# Patient Record
Sex: FEMALE | Race: WHITE | NOT HISPANIC OR LATINO | Employment: UNEMPLOYED
[De-identification: names, ages, dates, MRNs, and addresses within clinical notes are randomized per-mention and may not be internally consistent; named-entity substitution may affect disease eponyms.]

---

## 2017-02-16 ENCOUNTER — HOSPITAL (OUTPATIENT)
Dept: OTHER | Age: 32
End: 2017-02-16
Attending: INTERNAL MEDICINE

## 2017-03-28 ENCOUNTER — HOSPITAL (OUTPATIENT)
Dept: OTHER | Age: 32
End: 2017-03-28
Attending: EMERGENCY MEDICINE

## 2017-03-28 LAB
ALBUMIN SERPL-MCNC: 4.3 GM/DL (ref 3.6–5.1)
ALP SERPL-CCNC: 47 UNIT/L (ref 45–117)
ALT SERPL-CCNC: 28 UNIT/L
AMORPH SED URNS QL MICRO: ABNORMAL
AMYLASE SERPL-CCNC: 86 UNIT/L (ref 25–115)
ANALYZER ANC (IANC): ABNORMAL
ANION GAP SERPL CALC-SCNC: 13 MMOL/L (ref 10–20)
APPEARANCE UR: CLEAR
AST SERPL-CCNC: 16 UNIT/L
BACTERIA #/AREA URNS HPF: ABNORMAL /HPF
BASOPHILS # BLD: 0 THOUSAND/MCL (ref 0–0.3)
BASOPHILS NFR BLD: 0 %
BILIRUB CONJ SERPL-MCNC: 0.1 MG/DL (ref 0–0.2)
BILIRUB SERPL-MCNC: 0.5 MG/DL (ref 0.2–1)
BILIRUB UR QL: NEGATIVE
BUN SERPL-MCNC: 9 MG/DL (ref 6–20)
BUN/CREAT SERPL: 13 (ref 7–25)
CALCIUM SERPL-MCNC: 9.5 MG/DL (ref 8.4–10.2)
CAOX CRY URNS QL MICRO: ABNORMAL
CHLORIDE: 97 MMOL/L (ref 98–107)
CO2 SERPL-SCNC: 28 MMOL/L (ref 21–32)
COLOR UR: COLORLESS
CREAT SERPL-MCNC: 0.68 MG/DL (ref 0.51–0.95)
DIFFERENTIAL METHOD BLD: ABNORMAL
EOSINOPHIL # BLD: 0.2 THOUSAND/MCL (ref 0.1–0.5)
EOSINOPHIL NFR BLD: 2 %
EPITH CASTS #/AREA URNS LPF: ABNORMAL /[LPF]
ERYTHROCYTE [DISTWIDTH] IN BLOOD: 15.2 % (ref 11–15)
FATTY CASTS #/AREA URNS LPF: ABNORMAL /[LPF]
GLUCOSE SERPL-MCNC: 88 MG/DL (ref 65–99)
GLUCOSE UR-MCNC: NEGATIVE MG/DL
GRAN CASTS #/AREA URNS LPF: ABNORMAL /[LPF]
HEMATOCRIT: 34.9 % (ref 36–46.5)
HGB BLD-MCNC: 11.9 GM/DL (ref 12–15.5)
HGB UR QL: NEGATIVE
HYALINE CASTS #/AREA URNS LPF: ABNORMAL /LPF (ref 0–5)
KETONES UR-MCNC: NEGATIVE MG/DL
LEUKOCYTE ESTERASE UR QL STRIP: NEGATIVE
LIPASE SERPL-CCNC: 170 UNIT/L (ref 73–393)
LYMPHOCYTES # BLD: 2.3 THOUSAND/MCL (ref 1–4.8)
LYMPHOCYTES NFR BLD: 31 %
MAGNESIUM SERPL-MCNC: 2 MG/DL (ref 1.7–2.4)
MCH RBC QN AUTO: 20.2 PG (ref 26–34)
MCHC RBC AUTO-ENTMCNC: 34.1 GM/DL (ref 32–36.5)
MCV RBC AUTO: 59.3 FL (ref 78–100)
MIXED CELL CASTS #/AREA URNS LPF: ABNORMAL /[LPF]
MONOCYTES # BLD: 0.3 THOUSAND/MCL (ref 0.3–0.9)
MONOCYTES NFR BLD: 5 %
MUCOUS THREADS URNS QL MICRO: ABNORMAL
NEUTROPHILS # BLD: 4.6 THOUSAND/MCL (ref 1.8–7.7)
NEUTROPHILS NFR BLD: 62 %
NEUTS SEG NFR BLD: ABNORMAL %
NITRITE UR QL: NEGATIVE
PERCENT NRBC: ABNORMAL
PH UR: 7 UNIT (ref 5–7)
PLATELET # BLD: 359 THOUSAND/MCL (ref 140–450)
POTASSIUM SERPL-SCNC: 4 MMOL/L (ref 3.4–5.1)
PROCALCITONIN SERPL IA-MCNC: <0.05 NG/ML
PROT SERPL-MCNC: 7.6 GM/DL (ref 6.4–8.2)
PROT UR QL: NEGATIVE MG/DL
RBC # BLD: 5.89 MILLION/MCL (ref 4–5.2)
RBC #/AREA URNS HPF: ABNORMAL /HPF (ref 0–3)
RBC CASTS #/AREA URNS LPF: ABNORMAL /[LPF]
RENAL EPI CELLS #/AREA URNS HPF: ABNORMAL /[HPF]
SODIUM SERPL-SCNC: 134 MMOL/L (ref 135–145)
SP GR UR: <1.005 (ref 1–1.03)
SPECIMEN SOURCE: ABNORMAL
SPERM URNS QL MICRO: ABNORMAL
SQUAMOUS #/AREA URNS HPF: ABNORMAL /HPF (ref 0–5)
T VAGINALIS URNS QL MICRO: ABNORMAL
TRI-PHOS CRY URNS QL MICRO: ABNORMAL
URATE CRY URNS QL MICRO: ABNORMAL
URINE REFLEX: ABNORMAL
URNS CMNT MICRO: ABNORMAL
UROBILINOGEN UR QL: 0.2 MG/DL (ref 0–1)
WAXY CASTS #/AREA URNS LPF: ABNORMAL /[LPF]
WBC # BLD: 7.4 THOUSAND/MCL (ref 4.2–11)
WBC #/AREA URNS HPF: ABNORMAL /HPF (ref 0–5)
WBC CASTS #/AREA URNS LPF: ABNORMAL /[LPF]
YEAST HYPHAE URNS QL MICRO: ABNORMAL
YEAST URNS QL MICRO: ABNORMAL

## 2017-08-04 ENCOUNTER — HOSPITAL ENCOUNTER (EMERGENCY)
Facility: HOSPITAL | Age: 32
Discharge: HOME | End: 2017-08-04
Attending: PHYSICIAN ASSISTANT
Payer: COMMERCIAL

## 2017-08-04 ENCOUNTER — APPOINTMENT (OUTPATIENT)
Dept: GENERAL RADIOLOGY | Facility: HOSPITAL | Age: 32
End: 2017-08-04
Attending: PHYSICIAN ASSISTANT
Payer: COMMERCIAL

## 2017-08-04 VITALS
BODY MASS INDEX: 28.32 KG/M2 | RESPIRATION RATE: 16 BRPM | OXYGEN SATURATION: 97 % | WEIGHT: 170 LBS | TEMPERATURE: 98 F | HEIGHT: 65 IN | DIASTOLIC BLOOD PRESSURE: 66 MMHG | HEART RATE: 66 BPM | SYSTOLIC BLOOD PRESSURE: 106 MMHG
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
## 2017-08-04 DIAGNOSIS — S20.219A BILATERAL CONTUSION OF RIBS: ICD-10-CM

## 2017-08-04 DIAGNOSIS — S16.1XXA CERVICAL STRAIN, INITIAL ENCOUNTER: Primary | ICD-10-CM

## 2017-08-04 LAB — B-HCG UR QL: NEGATIVE

## 2017-08-04 PROCEDURE — 81025 URINE PREGNANCY TEST: CPT

## 2017-08-04 PROCEDURE — 99284 EMERGENCY DEPT VISIT MOD MDM: CPT

## 2017-08-04 PROCEDURE — 71111 X-RAY EXAM RIBS/CHEST4/> VWS: CPT | Performed by: PHYSICIAN ASSISTANT

## 2017-08-04 PROCEDURE — 72040 X-RAY EXAM NECK SPINE 2-3 VW: CPT | Performed by: PHYSICIAN ASSISTANT

## 2017-08-04 PROCEDURE — 96372 THER/PROPH/DIAG INJ SC/IM: CPT

## 2017-08-04 RX ORDER — VENLAFAXINE 75 MG/1
225 TABLET ORAL NIGHTLY
COMMUNITY

## 2017-08-04 RX ORDER — DIAZEPAM 5 MG/1
5 TABLET ORAL ONCE
Status: COMPLETED | OUTPATIENT
Start: 2017-08-04 | End: 2017-08-04

## 2017-08-04 RX ORDER — RISPERIDONE 1 MG/1
1 TABLET, FILM COATED ORAL NIGHTLY
COMMUNITY

## 2017-08-04 RX ORDER — HYDROCODONE BITARTRATE AND ACETAMINOPHEN 5; 325 MG/1; MG/1
1 TABLET ORAL ONCE
Status: COMPLETED | OUTPATIENT
Start: 2017-08-04 | End: 2017-08-04

## 2017-08-04 RX ORDER — KETOROLAC TROMETHAMINE 30 MG/ML
60 INJECTION, SOLUTION INTRAMUSCULAR; INTRAVENOUS ONCE
Status: COMPLETED | OUTPATIENT
Start: 2017-08-04 | End: 2017-08-04

## 2017-08-04 RX ORDER — TRAZODONE HYDROCHLORIDE 100 MG/1
100 TABLET ORAL NIGHTLY
COMMUNITY

## 2017-08-04 RX ORDER — CYCLOBENZAPRINE HCL 10 MG
10 TABLET ORAL 3 TIMES DAILY PRN
Qty: 14 TABLET | Refills: 0 | Status: SHIPPED | OUTPATIENT
Start: 2017-08-04 | End: 2017-08-11

## 2017-08-04 RX ORDER — HYDROCODONE BITARTRATE AND ACETAMINOPHEN 5; 325 MG/1; MG/1
1 TABLET ORAL EVERY 6 HOURS PRN
Qty: 8 TABLET | Refills: 0 | Status: SHIPPED | OUTPATIENT
Start: 2017-08-04 | End: 2017-08-11

## 2017-08-04 RX ORDER — IBUPROFEN 600 MG/1
TABLET ORAL
Qty: 20 TABLET | Refills: 0 | Status: SHIPPED | OUTPATIENT
Start: 2017-08-04

## 2017-08-04 RX ORDER — HYDROXYZINE HYDROCHLORIDE 25 MG/1
25 TABLET, FILM COATED ORAL 3 TIMES DAILY
COMMUNITY
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
## 2017-08-04 NOTE — ED NOTES
Pain reassessed and pt denies any relief from pain mediation.  Nola Garsia NP, notified with new orders to administer Norco.
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
## 2017-08-16 ENCOUNTER — HOSPITAL ENCOUNTER (EMERGENCY)
Facility: HOSPITAL | Age: 32
Discharge: HOME | End: 2017-08-16
Payer: COMMERCIAL

## 2017-08-16 VITALS
OXYGEN SATURATION: 98 % | HEART RATE: 80 BPM | BODY MASS INDEX: 29.02 KG/M2 | DIASTOLIC BLOOD PRESSURE: 74 MMHG | RESPIRATION RATE: 20 BRPM | WEIGHT: 170 LBS | HEIGHT: 64 IN | TEMPERATURE: 98 F | SYSTOLIC BLOOD PRESSURE: 112 MMHG

## 2017-08-16 DIAGNOSIS — M54.12 CERVICAL RADICULOPATHY: Primary | ICD-10-CM

## 2017-08-16 LAB — B-HCG UR QL: NEGATIVE

## 2017-08-16 PROCEDURE — 81025 URINE PREGNANCY TEST: CPT

## 2017-08-16 PROCEDURE — 99283 EMERGENCY DEPT VISIT LOW MDM: CPT

## 2017-08-16 RX ORDER — HYDROCODONE BITARTRATE AND ACETAMINOPHEN 5; 325 MG/1; MG/1
1-2 TABLET ORAL EVERY 4 HOURS PRN
Qty: 14 TABLET | Refills: 0 | Status: SHIPPED | OUTPATIENT
Start: 2017-08-16 | End: 2017-08-23

## 2017-08-16 RX ORDER — METHYLPREDNISOLONE 4 MG/1
TABLET ORAL
Qty: 1 PACKAGE | Refills: 0 | Status: SHIPPED | OUTPATIENT
Start: 2017-08-16 | End: 2017-08-21
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
## 2018-02-09 ENCOUNTER — HOSPITAL (OUTPATIENT)
Dept: OTHER | Age: 33
End: 2018-02-09
Attending: EMERGENCY MEDICINE

## 2018-02-11 ENCOUNTER — HOSPITAL (OUTPATIENT)
Dept: OTHER | Age: 33
End: 2018-02-11
Attending: EMERGENCY MEDICINE

## 2018-02-11 LAB
ANALYZER ANC (IANC): ABNORMAL
ANION GAP SERPL CALC-SCNC: 11 MMOL/L (ref 10–20)
BASOPHILS # BLD: 0 THOUSAND/MCL (ref 0–0.3)
BASOPHILS NFR BLD: 0 %
BUN SERPL-MCNC: 3 MG/DL (ref 6–20)
BUN/CREAT SERPL: 4 (ref 7–25)
CALCIUM SERPL-MCNC: 8.6 MG/DL (ref 8.4–10.2)
CHLORIDE: 101 MMOL/L (ref 98–107)
CO2 SERPL-SCNC: 27 MMOL/L (ref 21–32)
CREAT SERPL-MCNC: 0.67 MG/DL (ref 0.51–0.95)
DIFFERENTIAL METHOD BLD: ABNORMAL
EOSINOPHIL # BLD: 0.4 THOUSAND/MCL (ref 0.1–0.5)
EOSINOPHIL NFR BLD: 5 %
ERYTHROCYTE [DISTWIDTH] IN BLOOD: 16 % (ref 11–15)
GLUCOSE SERPL-MCNC: 107 MG/DL (ref 65–99)
HEMATOCRIT: 34.2 % (ref 36–46.5)
HGB BLD-MCNC: 11.2 GM/DL (ref 12–15.5)
LYMPHOCYTES # BLD: 3.5 THOUSAND/MCL (ref 1–4.8)
LYMPHOCYTES NFR BLD: 48 %
MCH RBC QN AUTO: 19.9 PG (ref 26–34)
MCHC RBC AUTO-ENTMCNC: 32.7 GM/DL (ref 32–36.5)
MCV RBC AUTO: 60.6 FL (ref 78–100)
MONOCYTES # BLD: 0.4 THOUSAND/MCL (ref 0.3–0.9)
MONOCYTES NFR BLD: 6 %
NEUTROPHILS # BLD: 3 THOUSAND/MCL (ref 1.8–7.7)
NEUTROPHILS NFR BLD: 41 %
NEUTS SEG NFR BLD: ABNORMAL %
PERCENT NRBC: ABNORMAL
PLATELET # BLD: 298 THOUSAND/MCL (ref 140–450)
POTASSIUM SERPL-SCNC: 3.9 MMOL/L (ref 3.4–5.1)
RBC # BLD: 5.64 MILLION/MCL (ref 4–5.2)
SODIUM SERPL-SCNC: 135 MMOL/L (ref 135–145)
WBC # BLD: 7.4 THOUSAND/MCL (ref 4.2–11)

## 2018-05-17 ENCOUNTER — HOSPITAL (OUTPATIENT)
Dept: OTHER | Age: 33
End: 2018-05-17
Attending: INTERNAL MEDICINE

## 2018-05-17 ENCOUNTER — CHARTING TRANS (OUTPATIENT)
Dept: OTHER | Age: 33
End: 2018-05-17

## 2018-05-17 LAB
ALBUMIN SERPL-MCNC: 3.6 GM/DL (ref 3.6–5.1)
ALP SERPL-CCNC: 45 UNIT/L (ref 45–117)
ALT SERPL-CCNC: 19 UNIT/L
ANALYZER ANC (IANC): ABNORMAL
AST SERPL-CCNC: 10 UNIT/L
BASOPHILS # BLD: 0 THOUSAND/MCL (ref 0–0.3)
BASOPHILS NFR BLD: 1 %
BILIRUB CONJ SERPL-MCNC: 0.1 MG/DL (ref 0–0.2)
BILIRUB SERPL-MCNC: 0.6 MG/DL (ref 0.2–1)
DIFFERENTIAL METHOD BLD: ABNORMAL
EOSINOPHIL # BLD: 0.2 THOUSAND/MCL (ref 0.1–0.5)
EOSINOPHIL NFR BLD: 3 %
ERYTHROCYTE [DISTWIDTH] IN BLOOD: 16 % (ref 11–15)
HEMATOCRIT: 33.5 % (ref 36–46.5)
HGB BLD-MCNC: 11.4 GM/DL (ref 12–15.5)
LIPASE SERPL-CCNC: 104 UNIT/L (ref 73–393)
LYMPHOCYTES # BLD: 1.5 THOUSAND/MCL (ref 1–4.8)
LYMPHOCYTES NFR BLD: 25 %
MCH RBC QN AUTO: 20.6 PG (ref 26–34)
MCHC RBC AUTO-ENTMCNC: 34 GM/DL (ref 32–36.5)
MCV RBC AUTO: 60.5 FL (ref 78–100)
MONOCYTES # BLD: 0.3 THOUSAND/MCL (ref 0.3–0.9)
MONOCYTES NFR BLD: 5 %
NEUTROPHILS # BLD: 4.1 THOUSAND/MCL (ref 1.8–7.7)
NEUTROPHILS NFR BLD: 66 %
NEUTS SEG NFR BLD: ABNORMAL %
NRBC (NRBCRE): ABNORMAL
PLATELET # BLD: 299 THOUSAND/MCL (ref 140–450)
PROT SERPL-MCNC: 6.4 GM/DL (ref 6.4–8.2)
RBC # BLD: 5.54 MILLION/MCL (ref 4–5.2)
WBC # BLD: 6.2 THOUSAND/MCL (ref 4.2–11)

## 2019-06-07 ENCOUNTER — TELEPHONE (OUTPATIENT)
Dept: SCHEDULING | Age: 34
End: 2019-06-07

## 2020-02-22 ENCOUNTER — APPOINTMENT (OUTPATIENT)
Dept: CT IMAGING | Age: 35
End: 2020-02-22
Attending: EMERGENCY MEDICINE

## 2020-02-22 ENCOUNTER — HOSPITAL ENCOUNTER (OUTPATIENT)
Age: 35
Setting detail: OBSERVATION
Discharge: LEFT BEFORE DISCHARGE | End: 2020-02-23
Attending: EMERGENCY MEDICINE | Admitting: INTERNAL MEDICINE

## 2020-02-22 ENCOUNTER — APPOINTMENT (OUTPATIENT)
Dept: MRI IMAGING | Age: 35
End: 2020-02-22
Attending: PSYCHIATRY & NEUROLOGY

## 2020-02-22 ENCOUNTER — APPOINTMENT (OUTPATIENT)
Dept: GENERAL RADIOLOGY | Age: 35
End: 2020-02-22
Attending: EMERGENCY MEDICINE

## 2020-02-22 ENCOUNTER — TELEPHONE (OUTPATIENT)
Dept: BEHAVIORAL HEALTH | Age: 35
End: 2020-02-22

## 2020-02-22 DIAGNOSIS — F28 OTHER PSYCHOTIC DISORDER NOT DUE TO SUBSTANCE OR KNOWN PHYSIOLOGICAL CONDITION (CMD): ICD-10-CM

## 2020-02-22 DIAGNOSIS — G93.40 ENCEPHALOPATHY: ICD-10-CM

## 2020-02-22 DIAGNOSIS — R25.1 SHAKING: ICD-10-CM

## 2020-02-22 DIAGNOSIS — F44.5 PSEUDOSEIZURE: ICD-10-CM

## 2020-02-22 DIAGNOSIS — R50.9 ACUTE FEBRILE ILLNESS: Primary | ICD-10-CM

## 2020-02-22 DIAGNOSIS — F43.10 PTSD (POST-TRAUMATIC STRESS DISORDER): ICD-10-CM

## 2020-02-22 DIAGNOSIS — R50.9 FEVER, UNSPECIFIED FEVER CAUSE: ICD-10-CM

## 2020-02-22 LAB
# SPEC OBTAINED: 4
ALBUMIN SERPL-MCNC: 3.9 G/DL (ref 3.6–5.1)
ALBUMIN/GLOB SERPL: 1.2 {RATIO} (ref 1–2.4)
ALP SERPL-CCNC: 44 UNITS/L (ref 45–117)
ALT SERPL-CCNC: 23 UNITS/L
AMPHETAMINES UR QL: NEGATIVE
ANION GAP SERPL CALC-SCNC: 10 MMOL/L (ref 10–20)
APAP SERPL-MCNC: <2 MCG/ML (ref 10–30)
APPEARANCE CSF: NORMAL
APPEARANCE UR: CLEAR
AST SERPL-CCNC: 25 UNITS/L
BACTERIA #/AREA URNS HPF: ABNORMAL /HPF
BARBITURATES UR QL: NEGATIVE
BASOPHILS # BLD AUTO: 0.1 K/MCL (ref 0–0.3)
BASOPHILS NFR BLD AUTO: 1 %
BENZODIAZ UR QL: POSITIVE
BILIRUB SERPL-MCNC: 0.3 MG/DL (ref 0.2–1)
BILIRUB UR QL STRIP: NEGATIVE
BUN SERPL-MCNC: 10 MG/DL (ref 6–20)
BUN/CREAT SERPL: 12 (ref 7–25)
BZE UR QL: NEGATIVE
CALCIUM SERPL-MCNC: 8.8 MG/DL (ref 8.4–10.2)
CANNABINOIDS UR QL SCN: POSITIVE
CHLORIDE SERPL-SCNC: 112 MMOL/L (ref 98–107)
CO2 SERPL-SCNC: 21 MMOL/L (ref 21–32)
COLOR UR: ABNORMAL
CREAT SERPL-MCNC: 0.82 MG/DL (ref 0.51–0.95)
CSF DIFFERENTIAL REMARKS(CREM): NORMAL
DIFFERENTIAL METHOD BLD: ABNORMAL
EOSINOPHIL # BLD AUTO: 0.3 K/MCL (ref 0.1–0.5)
EOSINOPHIL NFR SPEC: 4 %
ERYTHROCYTE [DISTWIDTH] IN BLOOD: 16.8 % (ref 11–15)
ETHANOL SERPL-MCNC: NORMAL MG/DL
GLOBULIN SER-MCNC: 3.3 G/DL (ref 2–4)
GLUCOSE BLDC GLUCOMTR-MCNC: 85 MG/DL (ref 70–99)
GLUCOSE CSF-MCNC: 55 MG/DL (ref 40–70)
GLUCOSE SERPL-MCNC: 84 MG/DL (ref 65–99)
GLUCOSE UR STRIP-MCNC: NEGATIVE MG/DL
HCG UR QL: NEGATIVE
HCT VFR BLD CALC: 36.6 % (ref 36–46.5)
HGB BLD-MCNC: 11.1 G/DL (ref 12–15.5)
HGB UR QL STRIP: NEGATIVE
HYALINE CASTS #/AREA URNS LPF: ABNORMAL /LPF (ref 0–5)
KETONES UR STRIP-MCNC: NEGATIVE MG/DL
LEUKOCYTE ESTERASE UR QL STRIP: NEGATIVE
LYMPHOCYTES # BLD MANUAL: 1.9 K/MCL (ref 1–4.8)
LYMPHOCYTES NFR BLD MANUAL: 24 %
MCH RBC QN AUTO: 19.7 PG (ref 26–34)
MCHC RBC AUTO-ENTMCNC: 30.3 G/DL (ref 32–36.5)
MCV RBC AUTO: 65 FL (ref 78–100)
MONOCYTES # BLD MANUAL: 0.3 K/MCL (ref 0.3–0.9)
MONOCYTES NFR BLD MANUAL: 4 %
MUCOUS THREADS URNS QL MICRO: PRESENT
NEUTROPHILS # BLD: 5.3 K/MCL (ref 1.8–7.7)
NEUTROPHILS NFR BLD AUTO: 67 %
NITRITE UR QL STRIP: NEGATIVE
NRBC BLD MANUAL-RTO: 0 /100 WBC
NUC CELL # CSF: 1 /MCL (ref 0–10)
OPIATES UR QL: NEGATIVE
PCP UR QL: NEGATIVE
PH UR STRIP: 6 UNITS (ref 5–7)
PLATELET # BLD: 335 K/MCL (ref 140–450)
POTASSIUM SERPL-SCNC: 4.1 MMOL/L (ref 3.4–5.1)
PROT CSF-MCNC: 28 MG/DL (ref 15–45)
PROT SERPL-MCNC: 7.2 G/DL (ref 6.4–8.2)
PROT UR STRIP-MCNC: NEGATIVE MG/DL
RBC # BLD: 5.63 MIL/MCL (ref 4–5.2)
RBC # CSF MANUAL: NORMAL /MCL
RBC #/AREA URNS HPF: ABNORMAL /HPF (ref 0–2)
SALICYLATES SERPL-MCNC: 3.6 MG/DL
SODIUM SERPL-SCNC: 139 MMOL/L (ref 135–145)
SP GR UR STRIP: 1.01 (ref 1–1.03)
SPECIMEN SOURCE: ABNORMAL
SPECIMEN VOL CSF: 11.5 ML
SQUAMOUS #/AREA URNS HPF: ABNORMAL /HPF (ref 0–5)
TUBE # CSF: 4
UROBILINOGEN UR STRIP-MCNC: 0.2 MG/DL (ref 0–1)
VALPROATE SERPL-MCNC: <3 MCG/ML (ref 50–125)
WBC # BLD: 7.9 K/MCL (ref 4.2–11)
WBC #/AREA URNS HPF: ABNORMAL /HPF (ref 0–5)
XANTHOCHROMIA CSF QL: NORMAL

## 2020-02-22 PROCEDURE — 80307 DRUG TEST PRSMV CHEM ANLYZR: CPT

## 2020-02-22 PROCEDURE — 10002807 HB RX 258: Performed by: INTERNAL MEDICINE

## 2020-02-22 PROCEDURE — 96374 THER/PROPH/DIAG INJ IV PUSH: CPT

## 2020-02-22 PROCEDURE — 82945 GLUCOSE OTHER FLUID: CPT

## 2020-02-22 PROCEDURE — 10002807 HB RX 258: Performed by: PSYCHIATRY & NEUROLOGY

## 2020-02-22 PROCEDURE — 96367 TX/PROPH/DG ADDL SEQ IV INF: CPT

## 2020-02-22 PROCEDURE — 80053 COMPREHEN METABOLIC PANEL: CPT

## 2020-02-22 PROCEDURE — 84157 ASSAY OF PROTEIN OTHER: CPT

## 2020-02-22 PROCEDURE — G0378 HOSPITAL OBSERVATION PER HR: HCPCS

## 2020-02-22 PROCEDURE — 10002800 HB RX 250 W HCPCS: Performed by: EMERGENCY MEDICINE

## 2020-02-22 PROCEDURE — 96365 THER/PROPH/DIAG IV INF INIT: CPT

## 2020-02-22 PROCEDURE — 99284 EMERGENCY DEPT VISIT MOD MDM: CPT

## 2020-02-22 PROCEDURE — 96376 TX/PRO/DX INJ SAME DRUG ADON: CPT

## 2020-02-22 PROCEDURE — 87483 CNS DNA AMP PROBE TYPE 12-25: CPT

## 2020-02-22 PROCEDURE — 10002801 HB RX 250 W/O HCPCS: Performed by: PSYCHIATRY & NEUROLOGY

## 2020-02-22 PROCEDURE — 87070 CULTURE OTHR SPECIMN AEROBIC: CPT

## 2020-02-22 PROCEDURE — 80329 ANALGESICS NON-OPIOID 1 OR 2: CPT

## 2020-02-22 PROCEDURE — 10002800 HB RX 250 W HCPCS: Performed by: PSYCHIATRY & NEUROLOGY

## 2020-02-22 PROCEDURE — 82962 GLUCOSE BLOOD TEST: CPT

## 2020-02-22 PROCEDURE — 84703 CHORIONIC GONADOTROPIN ASSAY: CPT

## 2020-02-22 PROCEDURE — 10004651 HB RX, NO CHARGE ITEM: Performed by: EMERGENCY MEDICINE

## 2020-02-22 PROCEDURE — 85025 COMPLETE CBC W/AUTO DIFF WBC: CPT

## 2020-02-22 PROCEDURE — 62328 DX LMBR SPI PNXR W/FLUOR/CT: CPT

## 2020-02-22 PROCEDURE — 89051 BODY FLUID CELL COUNT: CPT

## 2020-02-22 PROCEDURE — 96366 THER/PROPH/DIAG IV INF ADDON: CPT

## 2020-02-22 PROCEDURE — 10004651 HB RX, NO CHARGE ITEM: Performed by: INTERNAL MEDICINE

## 2020-02-22 PROCEDURE — 87040 BLOOD CULTURE FOR BACTERIA: CPT

## 2020-02-22 PROCEDURE — 10002803 HB RX 637: Performed by: INTERNAL MEDICINE

## 2020-02-22 PROCEDURE — 80164 ASSAY DIPROPYLACETIC ACD TOT: CPT

## 2020-02-22 PROCEDURE — 96375 TX/PRO/DX INJ NEW DRUG ADDON: CPT

## 2020-02-22 PROCEDURE — 80320 DRUG SCREEN QUANTALCOHOLS: CPT

## 2020-02-22 PROCEDURE — 96361 HYDRATE IV INFUSION ADD-ON: CPT

## 2020-02-22 PROCEDURE — 70450 CT HEAD/BRAIN W/O DYE: CPT

## 2020-02-22 PROCEDURE — 10002807 HB RX 258: Performed by: EMERGENCY MEDICINE

## 2020-02-22 PROCEDURE — 81001 URINALYSIS AUTO W/SCOPE: CPT

## 2020-02-22 PROCEDURE — 99220 INITIAL OBSERVATION CARE,LEVL III: CPT | Performed by: INTERNAL MEDICINE

## 2020-02-22 RX ORDER — MIDAZOLAM HYDROCHLORIDE 5 MG/ML
INJECTION INTRAMUSCULAR; INTRAVENOUS
Status: DISCONTINUED
Start: 2020-02-22 | End: 2020-02-22 | Stop reason: WASHOUT

## 2020-02-22 RX ORDER — CLONAZEPAM 0.5 MG/1
0.5 TABLET ORAL DAILY PRN
Status: DISCONTINUED | OUTPATIENT
Start: 2020-02-22 | End: 2020-02-23 | Stop reason: HOSPADM

## 2020-02-22 RX ORDER — ALBUTEROL SULFATE 90 UG/1
2 AEROSOL, METERED RESPIRATORY (INHALATION) EVERY 4 HOURS PRN
Status: ON HOLD | COMMUNITY
End: 2020-02-22

## 2020-02-22 RX ORDER — 0.9 % SODIUM CHLORIDE 0.9 %
2 VIAL (ML) INJECTION EVERY 12 HOURS SCHEDULED
Status: DISCONTINUED | OUTPATIENT
Start: 2020-02-22 | End: 2020-02-23 | Stop reason: HOSPADM

## 2020-02-22 RX ORDER — HYDROCODONE BITARTRATE AND ACETAMINOPHEN 5; 325 MG/1; MG/1
1 TABLET ORAL EVERY 4 HOURS PRN
Status: DISCONTINUED | OUTPATIENT
Start: 2020-02-22 | End: 2020-02-23 | Stop reason: HOSPADM

## 2020-02-22 RX ORDER — VENLAFAXINE HYDROCHLORIDE 75 MG/1
225 CAPSULE, EXTENDED RELEASE ORAL DAILY
Refills: 1 | COMMUNITY
Start: 2020-02-19 | End: 2022-08-03 | Stop reason: ALTCHOICE

## 2020-02-22 RX ORDER — CELECOXIB 100 MG/1
100 CAPSULE ORAL DAILY PRN
Refills: 1 | COMMUNITY
Start: 2019-12-18 | End: 2022-01-18

## 2020-02-22 RX ORDER — DIPHENHYDRAMINE HCL 25 MG
50 CAPSULE ORAL DAILY PRN
Status: DISCONTINUED | OUTPATIENT
Start: 2020-02-22 | End: 2020-02-23 | Stop reason: HOSPADM

## 2020-02-22 RX ORDER — LEVETIRACETAM 500 MG/1
500 TABLET ORAL 2 TIMES DAILY
Status: DISCONTINUED | OUTPATIENT
Start: 2020-02-23 | End: 2020-02-22

## 2020-02-22 RX ORDER — GABAPENTIN 400 MG/1
600 CAPSULE ORAL 3 TIMES DAILY
Refills: 1 | COMMUNITY
Start: 2020-02-18 | End: 2022-10-06 | Stop reason: ALTCHOICE

## 2020-02-22 RX ORDER — HEPARIN SODIUM 10000 [USP'U]/100ML
30 INJECTION, SOLUTION INTRAVENOUS CONTINUOUS
Status: DISCONTINUED | OUTPATIENT
Start: 2020-02-22 | End: 2020-02-22 | Stop reason: CLARIF

## 2020-02-22 RX ORDER — BUPROPION HYDROCHLORIDE 150 MG/1
150 TABLET, EXTENDED RELEASE ORAL EVERY MORNING
Refills: 1 | COMMUNITY
Start: 2020-02-19 | End: 2022-01-18

## 2020-02-22 RX ORDER — CEFAZOLIN SODIUM/WATER 1 G/10 ML
1000 SYRINGE (ML) INTRAVENOUS ONCE
Status: DISCONTINUED | OUTPATIENT
Start: 2020-02-22 | End: 2020-02-22 | Stop reason: DRUGHIGH

## 2020-02-22 RX ORDER — LORAZEPAM 2 MG/ML
1 INJECTION INTRAMUSCULAR ONCE
Status: COMPLETED | OUTPATIENT
Start: 2020-02-22 | End: 2020-02-22

## 2020-02-22 RX ORDER — DIPHENHYDRAMINE HCL 25 MG
50 CAPSULE ORAL NIGHTLY PRN
Status: ON HOLD | COMMUNITY
End: 2023-01-10 | Stop reason: HOSPADM

## 2020-02-22 RX ORDER — PRAZOSIN HYDROCHLORIDE 2 MG/1
4 CAPSULE ORAL NIGHTLY
COMMUNITY
End: 2022-08-03 | Stop reason: SDUPTHER

## 2020-02-22 RX ORDER — DIVALPROEX SODIUM 500 MG/1
500 TABLET, DELAYED RELEASE ORAL EVERY 12 HOURS SCHEDULED
Status: DISCONTINUED | OUTPATIENT
Start: 2020-02-23 | End: 2020-02-23 | Stop reason: HOSPADM

## 2020-02-22 RX ORDER — PRAZOSIN HYDROCHLORIDE 1 MG/1
1 CAPSULE ORAL NIGHTLY
Status: DISCONTINUED | OUTPATIENT
Start: 2020-02-22 | End: 2020-02-23 | Stop reason: HOSPADM

## 2020-02-22 RX ORDER — LORAZEPAM 2 MG/ML
INJECTION INTRAMUSCULAR
Status: DISPENSED
Start: 2020-02-22 | End: 2020-02-22

## 2020-02-22 RX ORDER — CLONAZEPAM 0.5 MG/1
0.5 TABLET ORAL DAILY PRN
Refills: 1 | COMMUNITY
Start: 2020-02-20 | End: 2022-08-03 | Stop reason: SDUPTHER

## 2020-02-22 RX ORDER — LORAZEPAM 2 MG/ML
2 INJECTION INTRAMUSCULAR ONCE
Status: COMPLETED | OUTPATIENT
Start: 2020-02-22 | End: 2020-02-22

## 2020-02-22 RX ORDER — CELECOXIB 100 MG/1
100 CAPSULE ORAL DAILY PRN
Status: DISCONTINUED | OUTPATIENT
Start: 2020-02-22 | End: 2020-02-23 | Stop reason: HOSPADM

## 2020-02-22 RX ORDER — BUPROPION HYDROCHLORIDE 150 MG/1
150 TABLET, EXTENDED RELEASE ORAL EVERY MORNING
Status: DISCONTINUED | OUTPATIENT
Start: 2020-02-23 | End: 2020-02-22

## 2020-02-22 RX ORDER — ACETAMINOPHEN 500 MG
1000 TABLET ORAL ONCE
Status: COMPLETED | OUTPATIENT
Start: 2020-02-22 | End: 2020-02-22

## 2020-02-22 RX ORDER — LEVETIRACETAM 500 MG/1
500 TABLET ORAL 2 TIMES DAILY
COMMUNITY
End: 2022-08-03 | Stop reason: ALTCHOICE

## 2020-02-22 RX ORDER — HALOPERIDOL 5 MG/ML
INJECTION INTRAMUSCULAR
Status: DISCONTINUED
Start: 2020-02-22 | End: 2020-02-22 | Stop reason: WASHOUT

## 2020-02-22 RX ORDER — CEFAZOLIN SODIUM/WATER 2 G/20 ML
2000 SYRINGE (ML) INTRAVENOUS ONCE
Status: COMPLETED | OUTPATIENT
Start: 2020-02-22 | End: 2020-02-22

## 2020-02-22 RX ORDER — GABAPENTIN 400 MG/1
400 CAPSULE ORAL 3 TIMES DAILY
Status: DISCONTINUED | OUTPATIENT
Start: 2020-02-22 | End: 2020-02-23 | Stop reason: HOSPADM

## 2020-02-22 RX ADMIN — SODIUM CHLORIDE, SODIUM LACTATE, POTASSIUM CHLORIDE, AND CALCIUM CHLORIDE 500 ML: .6; .31; .03; .02 INJECTION, SOLUTION INTRAVENOUS at 21:48

## 2020-02-22 RX ADMIN — DEXTROSE 1000 MG: 50 INJECTION, SOLUTION INTRAVENOUS at 12:31

## 2020-02-22 RX ADMIN — ACETAMINOPHEN 1000 MG: 500 TABLET, FILM COATED ORAL at 13:36

## 2020-02-22 RX ADMIN — SODIUM CHLORIDE, PRESERVATIVE FREE 2 ML: 5 INJECTION INTRAVENOUS at 21:49

## 2020-02-22 RX ADMIN — HYDROCODONE BITARTRATE AND ACETAMINOPHEN 1 TABLET: 5; 325 TABLET ORAL at 23:13

## 2020-02-22 RX ADMIN — CEFTRIAXONE SODIUM 2000 MG: 100 INJECTION, POWDER, FOR SOLUTION INTRAVENOUS at 13:38

## 2020-02-22 RX ADMIN — LORAZEPAM 1 MG: 2 INJECTION INTRAMUSCULAR; INTRAVENOUS at 08:30

## 2020-02-22 RX ADMIN — HEPARIN SODIUM 30 UNITS/KG/HR: 10000 INJECTION, SOLUTION INTRAVENOUS at 17:31

## 2020-02-22 RX ADMIN — ACYCLOVIR SODIUM 770 MG: 50 INJECTION, SOLUTION INTRAVENOUS at 15:14

## 2020-02-22 RX ADMIN — GABAPENTIN 400 MG: 400 CAPSULE ORAL at 23:14

## 2020-02-22 RX ADMIN — LORAZEPAM 2 MG: 2 INJECTION INTRAMUSCULAR; INTRAVENOUS at 13:13

## 2020-02-22 RX ADMIN — PRAZOSIN HYDROCHLORIDE 1 MG: 1 CAPSULE ORAL at 23:14

## 2020-02-22 RX ADMIN — SODIUM CHLORIDE, PRESERVATIVE FREE 2 ML: 5 INJECTION INTRAVENOUS at 08:30

## 2020-02-22 ASSESSMENT — PATIENT HEALTH QUESTIONNAIRE - PHQ9
SUM OF ALL RESPONSES TO PHQ9 QUESTIONS 1 TO 9: 12
7. TROUBLE CONCENTRATING ON THINGS, SUCH AS READING THE NEWSPAPER OR WATCHING TELEVISION: SEVERAL DAYS
2. FEELING DOWN, DEPRESSED OR HOPELESS: NEARLY EVERY DAY
9. THOUGHTS THAT YOU WOULD BE BETTER OFF DEAD, OR OF HURTING YOURSELF: NOT AT ALL
SUM OF ALL RESPONSES TO PHQ9 QUESTIONS 1 AND 2: 6
1. LITTLE INTEREST OR PLEASURE IN DOING THINGS: NEARLY EVERY DAY
5. POOR APPETITE OR OVEREATING: SEVERAL DAYS
SUM OF ALL RESPONSES TO PHQ9 QUESTIONS 1 AND 2: 6
4. FEELING TIRED OR HAVING LITTLE ENERGY: SEVERAL DAYS
SUM OF ALL RESPONSES TO PHQ QUESTIONS 1-9: 12
CLINICAL INTERPRETATION OF PHQ9 SCORE: MODERATE DEPRESSION
3. TROUBLE FALLING OR STAYING ASLEEP OR SLEEPING TOO MUCH: SEVERAL DAYS
IS PATIENT ABLE TO COMPLETE PHQ2 OR PHQ9: YES
6. FEELING BAD ABOUT YOURSELF - OR THAT YOU ARE A FAILURE OR HAVE LET YOURSELF OR YOUR FAMILY DOWN: SEVERAL DAYS
8. MOVING OR SPEAKING SO SLOWLY THAT OTHER PEOPLE COULD HAVE NOTICED. OR THE OPPOSITE, BEING SO FIGETY OR RESTLESS THAT YOU HAVE BEEN MOVING AROUND A LOT MORE THAN USUAL: SEVERAL DAYS
10. IF YOU CHECKED OFF ANY PROBLEMS, HOW DIFFICULT HAVE THESE PROBLEMS MADE IT FOR YOU TO DO YOUR WORK, TAKE CARE OF THINGS AT HOME, OR GET ALONG WITH OTHER PEOPLE: VERY DIFFICULT

## 2020-02-22 ASSESSMENT — PAIN SCALES - GENERAL
PAINLEVEL_OUTOF10: 7
PAINLEVEL_OUTOF10: 2
PAINLEVEL_OUTOF10: 5

## 2020-02-22 ASSESSMENT — ACTIVITIES OF DAILY LIVING (ADL)
DRESSING YOURSELF: NEEDS ASSISTANCE
FEEDING YOURSELF: NEEDS ASSISTANCE
ADL_SCORE: 6
ADL_BEFORE_ADMISSION: NEEDS/REQUIRES ASSISTANCE
BATHING: NEEDS ASSISTANCE
RECENT_DECLINE_ADL: YES, DECLINE IN AMBULATION/TRANSFERRING, COLLABORATE WITH PROVIDER (T)
DESCRIBE HOW PAIN IMPACTS YOUR LIFE: MOBILITY;MOOD;RELAXATION/REST/SLEEP
TOILETING: NEEDS ASSISTANCE
ADL_SHORT_OF_BREATH: YES
TRANSFERRING: NEEDS ASSISTANCE
CONTINENCE: NEEDS ASSISTANCE
CHRONIC_PAIN_PRESENT: YES, CHRONIC

## 2020-02-22 ASSESSMENT — LIFESTYLE VARIABLES
HOW OFTEN DO YOU HAVE A DRINK CONTAINING ALCOHOL: NEVER
ALCOHOL_USE_STATUS: NO OR LOW RISK WITH VALIDATED TOOL
HOW OFTEN DO YOU HAVE 6 OR MORE DRINKS ON ONE OCCASION: NEVER

## 2020-02-22 ASSESSMENT — COGNITIVE AND FUNCTIONAL STATUS - GENERAL
ARE YOU BLIND OR DO YOU HAVE SERIOUS DIFFICULTY SEEING, EVEN WHEN WEARING GLASSES: NO
ARE YOU DEAF OR DO YOU HAVE SERIOUS DIFFICULTY  HEARING: NO

## 2020-02-22 ASSESSMENT — COLUMBIA-SUICIDE SEVERITY RATING SCALE - C-SSRS: IS THE PATIENT ABLE TO COMPLETE C-SSRS: NO, DEFER TO LATER TIME

## 2020-02-23 ENCOUNTER — APPOINTMENT (OUTPATIENT)
Dept: MRI IMAGING | Age: 35
End: 2020-02-23
Attending: INTERNAL MEDICINE

## 2020-02-23 VITALS
HEIGHT: 67 IN | TEMPERATURE: 97.7 F | DIASTOLIC BLOOD PRESSURE: 82 MMHG | RESPIRATION RATE: 18 BRPM | SYSTOLIC BLOOD PRESSURE: 129 MMHG | BODY MASS INDEX: 34.91 KG/M2 | WEIGHT: 222.44 LBS | OXYGEN SATURATION: 98 % | HEART RATE: 71 BPM

## 2020-02-23 LAB
ALBUMIN SERPL-MCNC: 3.3 G/DL (ref 3.6–5.1)
ALBUMIN/GLOB SERPL: 1.1 {RATIO} (ref 1–2.4)
ALP SERPL-CCNC: 36 UNITS/L (ref 45–117)
ALT SERPL-CCNC: 18 UNITS/L
AMMONIA PLAS-SCNC: 25 MCMOL/L
ANION GAP SERPL CALC-SCNC: 10 MMOL/L (ref 10–20)
AST SERPL-CCNC: 9 UNITS/L
BASOPHILS # BLD AUTO: 0.1 K/MCL (ref 0–0.3)
BASOPHILS NFR BLD AUTO: 1 %
BILIRUB SERPL-MCNC: 0.5 MG/DL (ref 0.2–1)
BUN SERPL-MCNC: 10 MG/DL (ref 6–20)
BUN/CREAT SERPL: 13 (ref 7–25)
CALCIUM SERPL-MCNC: 8.7 MG/DL (ref 8.4–10.2)
CHLORIDE SERPL-SCNC: 110 MMOL/L (ref 98–107)
CK SERPL-CCNC: 86 UNITS/L (ref 26–192)
CO2 SERPL-SCNC: 24 MMOL/L (ref 21–32)
CREAT SERPL-MCNC: 0.77 MG/DL (ref 0.51–0.95)
CRP SERPL-MCNC: 0.4 MG/DL
DIFFERENTIAL METHOD BLD: ABNORMAL
EOSINOPHIL # BLD AUTO: 0.3 K/MCL (ref 0.1–0.5)
EOSINOPHIL NFR SPEC: 4 %
ERYTHROCYTE [DISTWIDTH] IN BLOOD: 16.1 % (ref 11–15)
GLOBULIN SER-MCNC: 2.9 G/DL (ref 2–4)
GLUCOSE SERPL-MCNC: 85 MG/DL (ref 65–99)
HCT VFR BLD CALC: 30.2 % (ref 36–46.5)
HGB BLD-MCNC: 9.6 G/DL (ref 12–15.5)
IMM GRANULOCYTES # BLD AUTO: 0 K/MCL (ref 0–0.2)
IMM GRANULOCYTES NFR BLD: 0 %
LYMPHOCYTES # BLD MANUAL: 2.2 K/MCL (ref 1–4.8)
LYMPHOCYTES NFR BLD MANUAL: 31 %
MCH RBC QN AUTO: 19.5 PG (ref 26–34)
MCHC RBC AUTO-ENTMCNC: 31.8 G/DL (ref 32–36.5)
MCV RBC AUTO: 61.3 FL (ref 78–100)
MONOCYTES # BLD MANUAL: 0.4 K/MCL (ref 0.3–0.9)
MONOCYTES NFR BLD MANUAL: 5 %
NEUTROPHILS # BLD: 4.1 K/MCL (ref 1.8–7.7)
NEUTROPHILS NFR BLD AUTO: 59 %
NRBC BLD MANUAL-RTO: 0 /100 WBC
PLATELET # BLD: 276 K/MCL (ref 140–450)
POTASSIUM SERPL-SCNC: 3.7 MMOL/L (ref 3.4–5.1)
PROT SERPL-MCNC: 6.2 G/DL (ref 6.4–8.2)
RBC # BLD: 4.93 MIL/MCL (ref 4–5.2)
SODIUM SERPL-SCNC: 140 MMOL/L (ref 135–145)
TSH SERPL-ACNC: 1.81 MCUNITS/ML (ref 0.35–5)
WBC # BLD: 6.9 K/MCL (ref 4.2–11)

## 2020-02-23 PROCEDURE — 80053 COMPREHEN METABOLIC PANEL: CPT

## 2020-02-23 PROCEDURE — 85025 COMPLETE CBC W/AUTO DIFF WBC: CPT

## 2020-02-23 PROCEDURE — 90792 PSYCH DIAG EVAL W/MED SRVCS: CPT | Performed by: PSYCHIATRY & NEUROLOGY

## 2020-02-23 PROCEDURE — 96367 TX/PROPH/DG ADDL SEQ IV INF: CPT

## 2020-02-23 PROCEDURE — 96365 THER/PROPH/DIAG IV INF INIT: CPT

## 2020-02-23 PROCEDURE — 82140 ASSAY OF AMMONIA: CPT

## 2020-02-23 PROCEDURE — 36415 COLL VENOUS BLD VENIPUNCTURE: CPT

## 2020-02-23 PROCEDURE — 10002803 HB RX 637: Performed by: INTERNAL MEDICINE

## 2020-02-23 PROCEDURE — 96375 TX/PRO/DX INJ NEW DRUG ADDON: CPT

## 2020-02-23 PROCEDURE — 99217 OBSERVATION CARE DISCHARGE: CPT | Performed by: INTERNAL MEDICINE

## 2020-02-23 PROCEDURE — 84443 ASSAY THYROID STIM HORMONE: CPT

## 2020-02-23 PROCEDURE — 86140 C-REACTIVE PROTEIN: CPT

## 2020-02-23 PROCEDURE — G0378 HOSPITAL OBSERVATION PER HR: HCPCS

## 2020-02-23 PROCEDURE — 82550 ASSAY OF CK (CPK): CPT

## 2020-02-23 PROCEDURE — 96376 TX/PRO/DX INJ SAME DRUG ADON: CPT

## 2020-02-23 RX ORDER — HALOPERIDOL 5 MG/ML
INJECTION INTRAMUSCULAR
Status: DISCONTINUED
Start: 2020-02-23 | End: 2020-02-23 | Stop reason: HOSPADM

## 2020-02-23 RX ORDER — CLONAZEPAM 1 MG/1
1 TABLET ORAL ONCE
Status: COMPLETED | OUTPATIENT
Start: 2020-02-23 | End: 2020-02-23

## 2020-02-23 RX ADMIN — HYDROCODONE BITARTRATE AND ACETAMINOPHEN 1 TABLET: 5; 325 TABLET ORAL at 04:02

## 2020-02-23 RX ADMIN — CLONAZEPAM 0.5 MG: 0.5 TABLET ORAL at 04:52

## 2020-02-23 RX ADMIN — CLONAZEPAM 1 MG: 1 TABLET ORAL at 10:53

## 2020-02-23 ASSESSMENT — PAIN SCALES - GENERAL
PAINLEVEL_OUTOF10: 4
PAINLEVEL_OUTOF10: 0
PAINLEVEL_OUTOF10: 7

## 2020-02-24 LAB
BACTERIA CSF CULT: NORMAL
C GATTII+NEOFOR DNA CSF QL NAA+NON-PROBE: NOT DETECTED
CMV DNA CSF QL NAA+NON-PROBE: NOT DETECTED
E COLI K1 DNA CSF QL NAA+NON-PROBE: NOT DETECTED
EV RNA CSF QL NAA+NON-PROBE: NOT DETECTED
GP B STREP DNA CSF QL NAA+NON-PROBE: NOT DETECTED
GRAM STN SPEC: NORMAL
HAEM INFLU DNA CSF QL NAA+NON-PROBE: NOT DETECTED
HHV6 DNA CSF QL NAA+NON-PROBE: NOT DETECTED
HSV1 DNA CSF QL NAA+NON-PROBE: NOT DETECTED
HSV2 DNA CSF QL NAA+NON-PROBE: NOT DETECTED
L MONOCYTOG DNA CSF QL NAA+NON-PROBE: NOT DETECTED
N MEN DNA CSF QL NAA+NON-PROBE: NOT DETECTED
PARECHOVIRUS A RNA CSF QL NAA+NON-PROBE: NOT DETECTED
REPORT STATUS (RPT): NORMAL
S PNEUM DNA CSF QL NAA+NON-PROBE: NOT DETECTED
SPECIMEN SOURCE: NORMAL
SPECIMEN SOURCE: NORMAL
VZV DNA CSF QL NAA+NON-PROBE: NOT DETECTED

## 2020-02-27 LAB
BACTERIA BLD CULT: NORMAL
BACTERIA BLD CULT: NORMAL
REPORT STATUS (RPT): NORMAL
REPORT STATUS (RPT): NORMAL
SPECIMEN SOURCE: NORMAL
SPECIMEN SOURCE: NORMAL

## 2021-04-12 ENCOUNTER — APPOINTMENT (OUTPATIENT)
Dept: BEHAVIORAL HEALTH | Age: 36
End: 2021-04-12

## 2021-05-04 ENCOUNTER — V-VISIT (OUTPATIENT)
Dept: BEHAVIORAL HEALTH | Age: 36
End: 2021-05-04

## 2021-05-04 DIAGNOSIS — F41.0 PANIC DISORDER (EPISODIC PAROXYSMAL ANXIETY): ICD-10-CM

## 2021-05-04 DIAGNOSIS — F41.1 GENERALIZED ANXIETY DISORDER: ICD-10-CM

## 2021-05-04 DIAGNOSIS — F33.0 MILD EPISODE OF RECURRENT MAJOR DEPRESSIVE DISORDER (CMD): Primary | ICD-10-CM

## 2021-05-04 PROCEDURE — 90791 PSYCH DIAGNOSTIC EVALUATION: CPT | Performed by: SOCIAL WORKER

## 2021-05-18 ENCOUNTER — APPOINTMENT (OUTPATIENT)
Dept: BEHAVIORAL HEALTH | Age: 36
End: 2021-05-18

## 2021-09-02 ENCOUNTER — HOSPITAL ENCOUNTER (OUTPATIENT)
Dept: LAB | Age: 36
Discharge: HOME | End: 2021-09-02
Attending: INTERNAL MEDICINE

## 2021-09-02 ENCOUNTER — LAB REQUISITION (OUTPATIENT)
Dept: LAB | Age: 36
End: 2021-09-02

## 2021-09-02 DIAGNOSIS — R19.7 DIARRHEA, UNSPECIFIED: ICD-10-CM

## 2021-09-02 DIAGNOSIS — R19.7 DIARRHEA, UNSPECIFIED TYPE: Primary | ICD-10-CM

## 2021-09-02 LAB
C DIFF TOX B TCDB STL QL NAA+PROBE: NOT DETECTED
C JEJUNI+C COLI AG STL QL: NORMAL

## 2021-09-02 PROCEDURE — 87045 FECES CULTURE AEROBIC BACT: CPT | Performed by: CLINICAL MEDICAL LABORATORY

## 2021-09-02 PROCEDURE — 87177 OVA AND PARASITES SMEARS: CPT | Performed by: CLINICAL MEDICAL LABORATORY

## 2021-09-02 PROCEDURE — 87209 SMEAR COMPLEX STAIN: CPT | Performed by: CLINICAL MEDICAL LABORATORY

## 2021-09-02 PROCEDURE — 87046 STOOL CULTR AEROBIC BACT EA: CPT | Performed by: CLINICAL MEDICAL LABORATORY

## 2021-09-02 PROCEDURE — 87493 C DIFF AMPLIFIED PROBE: CPT | Performed by: CLINICAL MEDICAL LABORATORY

## 2021-09-02 PROCEDURE — 87427 SHIGA-LIKE TOXIN AG IA: CPT | Performed by: CLINICAL MEDICAL LABORATORY

## 2021-09-02 PROCEDURE — 87449 NOS EACH ORGANISM AG IA: CPT | Performed by: CLINICAL MEDICAL LABORATORY

## 2021-09-03 LAB
BACTERIA STL CULT: NORMAL
E COLI SHIGA-LIKE TOXIN 1+2 STL QL IA: NORMAL
O+P SPEC MICRO: NORMAL

## 2021-12-29 DIAGNOSIS — Z11.52 ENCOUNTER FOR PREPROCEDURE SCREENING LABORATORY TESTING FOR COVID-19: Primary | ICD-10-CM

## 2021-12-29 DIAGNOSIS — Z01.812 ENCOUNTER FOR PREPROCEDURE SCREENING LABORATORY TESTING FOR COVID-19: Primary | ICD-10-CM

## 2022-01-18 ENCOUNTER — APPOINTMENT (OUTPATIENT)
Dept: CT IMAGING | Age: 37
End: 2022-01-18
Attending: EMERGENCY MEDICINE

## 2022-01-18 ENCOUNTER — HOSPITAL ENCOUNTER (OUTPATIENT)
Age: 37
Setting detail: OBSERVATION
LOS: 1 days | Discharge: HOME | End: 2022-01-19
Attending: EMERGENCY MEDICINE | Admitting: INTERNAL MEDICINE

## 2022-01-18 DIAGNOSIS — G40.901 STATUS EPILEPTICUS (CMD): Primary | ICD-10-CM

## 2022-01-18 LAB
ANION GAP SERPL CALC-SCNC: 8 MMOL/L (ref 10–20)
BASOPHILS # BLD: 0.1 K/MCL (ref 0–0.3)
BASOPHILS NFR BLD: 1 %
BUN SERPL-MCNC: 8 MG/DL (ref 6–20)
BUN/CREAT SERPL: 11 (ref 7–25)
CALCIUM SERPL-MCNC: 8.2 MG/DL (ref 8.4–10.2)
CHLORIDE SERPL-SCNC: 114 MMOL/L (ref 98–107)
CO2 SERPL-SCNC: 21 MMOL/L (ref 21–32)
CREAT SERPL-MCNC: 0.74 MG/DL (ref 0.51–0.95)
DEPRECATED RDW RBC: 34 FL (ref 39–50)
EOSINOPHIL # BLD: 0.4 K/MCL (ref 0–0.5)
EOSINOPHIL NFR BLD: 5 %
ERYTHROCYTE [DISTWIDTH] IN BLOOD: 16.1 % (ref 11–15)
ETHANOL SERPL-MCNC: NORMAL MG/DL
FASTING DURATION TIME PATIENT: ABNORMAL H
GFR SERPLBLD BASED ON 1.73 SQ M-ARVRAT: >90 ML/MIN
GLUCOSE SERPL-MCNC: 97 MG/DL (ref 70–99)
HCG SERPL-ACNC: <2 MUNITS/ML
HCT VFR BLD CALC: 36 % (ref 36–46.5)
HGB BLD-MCNC: 10.9 G/DL (ref 12–15.5)
IMM GRANULOCYTES # BLD AUTO: 0 K/MCL (ref 0–0.2)
IMM GRANULOCYTES # BLD: 0 %
LYMPHOCYTES # BLD: 1.6 K/MCL (ref 1–4.8)
LYMPHOCYTES NFR BLD: 20 %
MAGNESIUM SERPL-MCNC: 1.9 MG/DL (ref 1.7–2.4)
MCH RBC QN AUTO: 18.9 PG (ref 26–34)
MCHC RBC AUTO-ENTMCNC: 30.3 G/DL (ref 32–36.5)
MCV RBC AUTO: 62.4 FL (ref 78–100)
MONOCYTES # BLD: 0.3 K/MCL (ref 0.3–0.9)
MONOCYTES NFR BLD: 4 %
NEUTROPHILS # BLD: 5.6 K/MCL (ref 1.8–7.7)
NEUTROPHILS NFR BLD: 70 %
NRBC BLD MANUAL-RTO: 0 /100 WBC
PLATELET # BLD AUTO: 323 K/MCL (ref 140–450)
POTASSIUM SERPL-SCNC: 3.3 MMOL/L (ref 3.4–5.1)
RBC # BLD: 5.77 MIL/MCL (ref 4–5.2)
SARS-COV-2 RNA RESP QL NAA+PROBE: NOT DETECTED
SERVICE CMNT-IMP: NORMAL
SERVICE CMNT-IMP: NORMAL
SODIUM SERPL-SCNC: 140 MMOL/L (ref 135–145)
WBC # BLD: 7.9 K/MCL (ref 4.2–11)

## 2022-01-18 PROCEDURE — 96365 THER/PROPH/DIAG IV INF INIT: CPT

## 2022-01-18 PROCEDURE — 10004651 HB RX, NO CHARGE ITEM: Performed by: EMERGENCY MEDICINE

## 2022-01-18 PROCEDURE — 99285 EMERGENCY DEPT VISIT HI MDM: CPT

## 2022-01-18 PROCEDURE — 84702 CHORIONIC GONADOTROPIN TEST: CPT | Performed by: EMERGENCY MEDICINE

## 2022-01-18 PROCEDURE — G0378 HOSPITAL OBSERVATION PER HR: HCPCS

## 2022-01-18 PROCEDURE — 87635 SARS-COV-2 COVID-19 AMP PRB: CPT | Performed by: EMERGENCY MEDICINE

## 2022-01-18 PROCEDURE — 83735 ASSAY OF MAGNESIUM: CPT | Performed by: EMERGENCY MEDICINE

## 2022-01-18 PROCEDURE — 72125 CT NECK SPINE W/O DYE: CPT

## 2022-01-18 PROCEDURE — 70450 CT HEAD/BRAIN W/O DYE: CPT

## 2022-01-18 PROCEDURE — G1004 CDSM NDSC: HCPCS

## 2022-01-18 PROCEDURE — 82077 ASSAY SPEC XCP UR&BREATH IA: CPT | Performed by: EMERGENCY MEDICINE

## 2022-01-18 PROCEDURE — 10002807 HB RX 258: Performed by: EMERGENCY MEDICINE

## 2022-01-18 PROCEDURE — 10002800 HB RX 250 W HCPCS

## 2022-01-18 PROCEDURE — 80048 BASIC METABOLIC PNL TOTAL CA: CPT | Performed by: EMERGENCY MEDICINE

## 2022-01-18 PROCEDURE — 85025 COMPLETE CBC W/AUTO DIFF WBC: CPT | Performed by: EMERGENCY MEDICINE

## 2022-01-18 PROCEDURE — 96375 TX/PRO/DX INJ NEW DRUG ADDON: CPT

## 2022-01-18 PROCEDURE — 84146 ASSAY OF PROLACTIN: CPT | Performed by: NURSE PRACTITIONER

## 2022-01-18 PROCEDURE — 96374 THER/PROPH/DIAG INJ IV PUSH: CPT

## 2022-01-18 PROCEDURE — 10002800 HB RX 250 W HCPCS: Performed by: EMERGENCY MEDICINE

## 2022-01-18 RX ORDER — ENOXAPARIN SODIUM 100 MG/ML
40 INJECTION SUBCUTANEOUS 2 TIMES DAILY
Status: DISCONTINUED | OUTPATIENT
Start: 2022-01-19 | End: 2022-01-19 | Stop reason: HOSPADM

## 2022-01-18 RX ORDER — POTASSIUM CHLORIDE 1.5 G/1.58G
40 POWDER, FOR SOLUTION ORAL
Status: DISCONTINUED | OUTPATIENT
Start: 2022-01-19 | End: 2022-01-19 | Stop reason: HOSPADM

## 2022-01-18 RX ORDER — PROPRANOLOL HCL 60 MG
60 CAPSULE, EXTENDED RELEASE 24HR ORAL DAILY
COMMUNITY
Start: 2021-12-06 | End: 2022-10-06 | Stop reason: SDUPTHER

## 2022-01-18 RX ORDER — TOPIRAMATE 100 MG/1
100 TABLET, FILM COATED ORAL 2 TIMES DAILY
COMMUNITY
Start: 2022-01-05 | End: 2023-06-22

## 2022-01-18 RX ORDER — SUMATRIPTAN 100 MG/1
100 TABLET, FILM COATED ORAL DAILY PRN
COMMUNITY
Start: 2021-09-10 | End: 2023-01-05 | Stop reason: ALTCHOICE

## 2022-01-18 RX ORDER — ONDANSETRON 4 MG/1
4 TABLET, FILM COATED ORAL PRN
COMMUNITY
End: 2022-10-06 | Stop reason: SDUPTHER

## 2022-01-18 RX ORDER — TAMSULOSIN HYDROCHLORIDE 0.4 MG/1
CAPSULE ORAL
COMMUNITY
End: 2022-01-18

## 2022-01-18 RX ORDER — CETIRIZINE HYDROCHLORIDE 10 MG/1
TABLET ORAL
COMMUNITY
End: 2022-01-18

## 2022-01-18 RX ORDER — LORAZEPAM 2 MG/ML
INJECTION INTRAMUSCULAR
Status: COMPLETED
Start: 2022-01-18 | End: 2022-01-18

## 2022-01-18 RX ORDER — LORAZEPAM 2 MG/ML
INJECTION INTRAMUSCULAR
Status: DISCONTINUED
Start: 2022-01-18 | End: 2022-01-18 | Stop reason: WASHOUT

## 2022-01-18 RX ORDER — LORAZEPAM 2 MG/ML
4 INJECTION INTRAMUSCULAR ONCE
Status: COMPLETED | OUTPATIENT
Start: 2022-01-18 | End: 2022-01-18

## 2022-01-18 RX ORDER — DOXEPIN HYDROCHLORIDE 75 MG/1
75 CAPSULE ORAL NIGHTLY
COMMUNITY
End: 2022-08-03 | Stop reason: ALTCHOICE

## 2022-01-18 RX ORDER — SODIUM CHLORIDE 9 MG/ML
INJECTION, SOLUTION INTRAVENOUS CONTINUOUS PRN
Status: DISCONTINUED | OUTPATIENT
Start: 2022-01-18 | End: 2022-01-19 | Stop reason: HOSPADM

## 2022-01-18 RX ORDER — LORAZEPAM 2 MG/ML
2 INJECTION INTRAMUSCULAR ONCE
Status: COMPLETED | OUTPATIENT
Start: 2022-01-18 | End: 2022-01-18

## 2022-01-18 RX ORDER — FLUTICASONE PROPIONATE 50 MCG
1 SPRAY, SUSPENSION (ML) NASAL DAILY PRN
Status: ON HOLD | COMMUNITY
End: 2023-02-03

## 2022-01-18 RX ORDER — ALBUTEROL SULFATE 90 UG/1
2 AEROSOL, METERED RESPIRATORY (INHALATION) EVERY 4 HOURS PRN
COMMUNITY

## 2022-01-18 RX ORDER — FREMANEZUMAB-VFRM 225 MG/1.5ML
225 INJECTION SUBCUTANEOUS
COMMUNITY
Start: 2022-01-17 | End: 2022-12-14

## 2022-01-18 RX ORDER — ACETAMINOPHEN 500 MG
1000 TABLET ORAL ONCE
Status: COMPLETED | OUTPATIENT
Start: 2022-01-18 | End: 2022-01-18

## 2022-01-18 RX ORDER — 0.9 % SODIUM CHLORIDE 0.9 %
2 VIAL (ML) INJECTION EVERY 12 HOURS SCHEDULED
Status: DISCONTINUED | OUTPATIENT
Start: 2022-01-18 | End: 2022-01-19 | Stop reason: HOSPADM

## 2022-01-18 RX ORDER — LORAZEPAM 2 MG/ML
INJECTION INTRAMUSCULAR
Status: DISPENSED
Start: 2022-01-18 | End: 2022-01-19

## 2022-01-18 RX ORDER — VORTIOXETINE 10 MG/1
15 TABLET, FILM COATED ORAL EVERY MORNING
COMMUNITY
Start: 2021-12-19 | End: 2022-08-03 | Stop reason: DRUGHIGH

## 2022-01-18 RX ADMIN — LORAZEPAM 2 MG: 2 INJECTION INTRAMUSCULAR; INTRAVENOUS at 17:50

## 2022-01-18 RX ADMIN — LORAZEPAM 4 MG: 2 INJECTION INTRAMUSCULAR; INTRAVENOUS at 18:10

## 2022-01-18 RX ADMIN — KETOROLAC TROMETHAMINE 30 MG: 30 INJECTION, SOLUTION INTRAMUSCULAR at 21:11

## 2022-01-18 RX ADMIN — ACETAMINOPHEN 1000 MG: 500 TABLET ORAL at 21:12

## 2022-01-18 RX ADMIN — LORAZEPAM 4 MG: 2 INJECTION INTRAMUSCULAR at 18:10

## 2022-01-18 RX ADMIN — LEVETIRACETAM 4500 MG: 100 INJECTION, SOLUTION INTRAVENOUS at 18:30

## 2022-01-18 ASSESSMENT — PAIN SCALES - GENERAL
PAINLEVEL_OUTOF10: 5
PAINLEVEL_OUTOF10: 5

## 2022-01-19 VITALS
TEMPERATURE: 97.9 F | HEART RATE: 97 BPM | RESPIRATION RATE: 18 BRPM | SYSTOLIC BLOOD PRESSURE: 120 MMHG | DIASTOLIC BLOOD PRESSURE: 89 MMHG | OXYGEN SATURATION: 95 % | HEIGHT: 65 IN | BODY MASS INDEX: 40.26 KG/M2 | WEIGHT: 241.62 LBS

## 2022-01-19 LAB
GLUCOSE BLDC GLUCOMTR-MCNC: 107 MG/DL (ref 70–99)
PROLACTIN SERPL-MCNC: 11.6 NG/ML (ref 2.8–29.2)
RAINBOW EXTRA TUBES HOLD SPECIMEN: NORMAL

## 2022-01-19 PROCEDURE — 10002800 HB RX 250 W HCPCS: Performed by: INTERNAL MEDICINE

## 2022-01-19 PROCEDURE — 99243 OFF/OP CNSLTJ NEW/EST LOW 30: CPT | Performed by: PSYCHIATRY & NEUROLOGY

## 2022-01-19 PROCEDURE — 10004651 HB RX, NO CHARGE ITEM: Performed by: INTERNAL MEDICINE

## 2022-01-19 PROCEDURE — 10002800 HB RX 250 W HCPCS

## 2022-01-19 PROCEDURE — 10002803 HB RX 637: Performed by: INTERNAL MEDICINE

## 2022-01-19 PROCEDURE — 96376 TX/PRO/DX INJ SAME DRUG ADON: CPT

## 2022-01-19 PROCEDURE — G0378 HOSPITAL OBSERVATION PER HR: HCPCS

## 2022-01-19 PROCEDURE — X1094 NO CHARGE VISIT: HCPCS | Performed by: INTERNAL MEDICINE

## 2022-01-19 PROCEDURE — 99220 INITIAL OBSERVATION CARE,LEVL III: CPT | Performed by: INTERNAL MEDICINE

## 2022-01-19 PROCEDURE — 96372 THER/PROPH/DIAG INJ SC/IM: CPT

## 2022-01-19 PROCEDURE — 82962 GLUCOSE BLOOD TEST: CPT

## 2022-01-19 RX ORDER — FLUTICASONE PROPIONATE 50 MCG
1 SPRAY, SUSPENSION (ML) NASAL DAILY
Status: DISCONTINUED | OUTPATIENT
Start: 2022-01-19 | End: 2022-01-19 | Stop reason: HOSPADM

## 2022-01-19 RX ORDER — PRAZOSIN HYDROCHLORIDE 1 MG/1
4 CAPSULE ORAL NIGHTLY
Status: DISCONTINUED | OUTPATIENT
Start: 2022-01-19 | End: 2022-01-19 | Stop reason: HOSPADM

## 2022-01-19 RX ORDER — LORAZEPAM 2 MG/ML
INJECTION INTRAMUSCULAR
Status: COMPLETED
Start: 2022-01-19 | End: 2022-01-19

## 2022-01-19 RX ORDER — PROPRANOLOL HCL 60 MG
60 CAPSULE, EXTENDED RELEASE 24HR ORAL DAILY
Status: DISCONTINUED | OUTPATIENT
Start: 2022-01-19 | End: 2022-01-19 | Stop reason: HOSPADM

## 2022-01-19 RX ORDER — CLONAZEPAM 0.5 MG/1
0.5 TABLET ORAL DAILY PRN
Status: DISCONTINUED | OUTPATIENT
Start: 2022-01-19 | End: 2022-01-19 | Stop reason: HOSPADM

## 2022-01-19 RX ORDER — LORAZEPAM 2 MG/ML
2 INJECTION INTRAMUSCULAR EVERY 4 HOURS PRN
Status: DISCONTINUED | OUTPATIENT
Start: 2022-01-19 | End: 2022-01-19

## 2022-01-19 RX ORDER — TOPIRAMATE 100 MG/1
100 TABLET, FILM COATED ORAL 2 TIMES DAILY
Status: DISCONTINUED | OUTPATIENT
Start: 2022-01-19 | End: 2022-01-19 | Stop reason: HOSPADM

## 2022-01-19 RX ORDER — LEVETIRACETAM 500 MG/1
500 TABLET ORAL 2 TIMES DAILY
Status: DISCONTINUED | OUTPATIENT
Start: 2022-01-19 | End: 2022-01-19

## 2022-01-19 RX ORDER — SUMATRIPTAN 50 MG/1
100 TABLET, FILM COATED ORAL DAILY PRN
Status: DISCONTINUED | OUTPATIENT
Start: 2022-01-19 | End: 2022-01-19 | Stop reason: HOSPADM

## 2022-01-19 RX ADMIN — SODIUM CHLORIDE, PRESERVATIVE FREE 2 ML: 5 INJECTION INTRAVENOUS at 09:37

## 2022-01-19 RX ADMIN — ENOXAPARIN SODIUM 40 MG: 40 INJECTION SUBCUTANEOUS at 09:34

## 2022-01-19 RX ADMIN — SUMATRIPTAN SUCCINATE 100 MG: 50 TABLET ORAL at 01:29

## 2022-01-19 RX ADMIN — LORAZEPAM 2 MG: 2 INJECTION INTRAMUSCULAR; INTRAVENOUS at 00:40

## 2022-01-19 RX ADMIN — LORAZEPAM 2 MG: 2 INJECTION INTRAMUSCULAR; INTRAVENOUS at 04:45

## 2022-01-19 RX ADMIN — PRAZOSIN HYDROCHLORIDE 4 MG: 1 CAPSULE ORAL at 01:30

## 2022-01-19 RX ADMIN — VORTIOXETINE 10 MG: 10 TABLET, FILM COATED ORAL at 09:34

## 2022-01-19 RX ADMIN — PROPRANOLOL HYDROCHLORIDE 60 MG: 60 CAPSULE, EXTENDED RELEASE ORAL at 11:26

## 2022-01-19 RX ADMIN — POTASSIUM CHLORIDE 40 MEQ: 1.5 FOR SOLUTION ORAL at 09:34

## 2022-01-19 RX ADMIN — SODIUM CHLORIDE, PRESERVATIVE FREE 2 ML: 5 INJECTION INTRAVENOUS at 01:03

## 2022-01-19 ASSESSMENT — COLUMBIA-SUICIDE SEVERITY RATING SCALE - C-SSRS
HOW LONG AGO DID YOU DO ANY OF THESE?: BETWEEN THREE MONTHS AND A YEAR AGO
IS THE PATIENT ABLE TO COMPLETE C-SSRS: YES
2. HAVE YOU ACTUALLY HAD ANY THOUGHTS OF KILLING YOURSELF?: NO
6. HAVE YOU EVER DONE ANYTHING, STARTED TO DO ANYTHING, OR PREPARED TO DO ANYTHING TO END YOUR LIFE?: YES
1. WITHIN THE PAST MONTH, HAVE YOU WISHED YOU WERE DEAD OR WISHED YOU COULD GO TO SLEEP AND NOT WAKE UP?: NO

## 2022-01-19 ASSESSMENT — LIFESTYLE VARIABLES
HOW OFTEN DO YOU HAVE A DRINK CONTAINING ALCOHOL: NEVER
HOW MANY STANDARD DRINKS CONTAINING ALCOHOL DO YOU HAVE ON A TYPICAL DAY: 0,1 OR 2
AUDIT-C TOTAL SCORE: 0
ALCOHOL_USE_STATUS: NO OR LOW RISK WITH VALIDATED TOOL
HOW OFTEN DO YOU HAVE 6 OR MORE DRINKS ON ONE OCCASION: NEVER

## 2022-01-19 ASSESSMENT — PATIENT HEALTH QUESTIONNAIRE - PHQ9
5. POOR APPETITE OR OVEREATING: MORE THAN HALF THE DAYS
6. FEELING BAD ABOUT YOURSELF - OR THAT YOU ARE A FAILURE OR HAVE LET YOURSELF OR YOUR FAMILY DOWN: NEARLY EVERY DAY
4. FEELING TIRED OR HAVING LITTLE ENERGY: NEARLY EVERY DAY
SUM OF ALL RESPONSES TO PHQ9 QUESTIONS 1 AND 2: 6
1. LITTLE INTEREST OR PLEASURE IN DOING THINGS: NEARLY EVERY DAY
SUM OF ALL RESPONSES TO PHQ QUESTIONS 1-9: 23
2. FEELING DOWN, DEPRESSED OR HOPELESS: NEARLY EVERY DAY
CLINICAL INTERPRETATION OF PHQ9 SCORE: SEVERE DEPRESSION
3. TROUBLE FALLING OR STAYING ASLEEP OR SLEEPING TOO MUCH: NEARLY EVERY DAY
10. IF YOU CHECKED OFF ANY PROBLEMS, HOW DIFFICULT HAVE THESE PROBLEMS MADE IT FOR YOU TO DO YOUR WORK, TAKE CARE OF THINGS AT HOME, OR GET ALONG WITH OTHER PEOPLE: SOMEWHAT DIFFICULT
IS PATIENT ABLE TO COMPLETE PHQ2 OR PHQ9: YES
CLINICAL INTERPRETATION OF PHQ2 SCORE: FURTHER SCREENING NEEDED
SUM OF ALL RESPONSES TO PHQ9 QUESTIONS 1 AND 2: 6
8. MOVING OR SPEAKING SO SLOWLY THAT OTHER PEOPLE COULD HAVE NOTICED. OR THE OPPOSITE, BEING SO FIGETY OR RESTLESS THAT YOU HAVE BEEN MOVING AROUND A LOT MORE THAN USUAL: NEARLY EVERY DAY
7. TROUBLE CONCENTRATING ON THINGS, SUCH AS READING THE NEWSPAPER OR WATCHING TELEVISION: NEARLY EVERY DAY
9. THOUGHTS THAT YOU WOULD BE BETTER OFF DEAD, OR OF HURTING YOURSELF: NOT AT ALL

## 2022-01-19 ASSESSMENT — COGNITIVE AND FUNCTIONAL STATUS - GENERAL
DO YOU HAVE SERIOUS DIFFICULTY WALKING OR CLIMBING STAIRS: NO
DO YOU HAVE DIFFICULTY DRESSING OR BATHING: YES
BECAUSE OF A PHYSICAL, MENTAL, OR EMOTIONAL CONDITION, DO YOU HAVE SERIOUS DIFFICULTY CONCENTRATING, REMEMBERING OR MAKING DECISIONS: YES
ARE YOU BLIND OR DO YOU HAVE SERIOUS DIFFICULTY SEEING, EVEN WHEN WEARING GLASSES: YES
BECAUSE OF A PHYSICAL, MENTAL, OR EMOTIONAL CONDITION, DO YOU HAVE DIFFICULTY DOING ERRANDS ALONE: YES
ARE YOU DEAF OR DO YOU HAVE SERIOUS DIFFICULTY  HEARING: YES

## 2022-01-19 ASSESSMENT — PAIN SCALES - GENERAL
PAINLEVEL_OUTOF10: 5
PAINLEVEL_OUTOF10: 5
PAINLEVEL_OUTOF10: 0

## 2022-01-19 ASSESSMENT — ACTIVITIES OF DAILY LIVING (ADL)
TRANSFERRING: NEEDS ASSISTANCE
ADL_SCORE: 11
CHRONIC_PAIN_PRESENT: NO
ADL_SHORT_OF_BREATH: NO
CONTINENCE: INDEPENDENT
DRESSING YOURSELF: INDEPENDENT
FEEDING YOURSELF: INDEPENDENT
TOILETING: INDEPENDENT
BATHING: INDEPENDENT
ADL_BEFORE_ADMISSION: NEEDS/REQUIRES ASSISTANCE
RECENT_DECLINE_ADL: YES, ACUTE ILLNESS WITHOUT THERAPY NEEDS

## 2022-01-20 ENCOUNTER — APPOINTMENT (OUTPATIENT)
Dept: GASTROENTEROLOGY | Age: 37
End: 2022-01-20
Attending: INTERNAL MEDICINE

## 2022-02-11 ENCOUNTER — APPOINTMENT (OUTPATIENT)
Dept: CT IMAGING | Age: 37
End: 2022-02-11
Attending: EMERGENCY MEDICINE

## 2022-02-11 ENCOUNTER — HOSPITAL ENCOUNTER (EMERGENCY)
Age: 37
Discharge: HOME | End: 2022-02-11
Attending: EMERGENCY MEDICINE

## 2022-02-11 VITALS
TEMPERATURE: 98.2 F | BODY MASS INDEX: 43.18 KG/M2 | WEIGHT: 259.48 LBS | HEART RATE: 64 BPM | OXYGEN SATURATION: 98 % | SYSTOLIC BLOOD PRESSURE: 129 MMHG | DIASTOLIC BLOOD PRESSURE: 77 MMHG | RESPIRATION RATE: 21 BRPM

## 2022-02-11 DIAGNOSIS — F44.5 PSEUDOSEIZURE: Primary | ICD-10-CM

## 2022-02-11 LAB
ANION GAP SERPL CALC-SCNC: 9 MMOL/L (ref 10–20)
APPEARANCE UR: CLEAR
B-HCG UR QL: NEGATIVE
BASOPHILS # BLD: 0.1 K/MCL (ref 0–0.3)
BASOPHILS NFR BLD: 1 %
BILIRUB UR QL STRIP: NEGATIVE
BUN SERPL-MCNC: 7 MG/DL (ref 6–20)
BUN/CREAT SERPL: 8 (ref 7–25)
CALCIUM SERPL-MCNC: 9.1 MG/DL (ref 8.4–10.2)
CHLORIDE SERPL-SCNC: 109 MMOL/L (ref 98–107)
CO2 SERPL-SCNC: 23 MMOL/L (ref 21–32)
COLOR UR: COLORLESS
CREAT SERPL-MCNC: 0.89 MG/DL (ref 0.51–0.95)
DEPRECATED RDW RBC: 36 FL (ref 39–50)
EOSINOPHIL # BLD: 0.3 K/MCL (ref 0–0.5)
EOSINOPHIL NFR BLD: 4 %
ERYTHROCYTE [DISTWIDTH] IN BLOOD: 17.1 % (ref 11–15)
FASTING DURATION TIME PATIENT: ABNORMAL H
GFR SERPLBLD BASED ON 1.73 SQ M-ARVRAT: 84 ML/MIN
GLUCOSE SERPL-MCNC: 97 MG/DL (ref 70–99)
GLUCOSE UR STRIP-MCNC: NEGATIVE MG/DL
HCG UR QL: NEGATIVE
HCT VFR BLD CALC: 35.1 % (ref 36–46.5)
HGB BLD-MCNC: 10.6 G/DL (ref 12–15.5)
HGB UR QL STRIP: NEGATIVE
IMM GRANULOCYTES # BLD AUTO: 0 K/MCL (ref 0–0.2)
IMM GRANULOCYTES # BLD: 0 %
INTERNAL PROCEDURAL CONTROLS ACCEPTABLE: YES
KETONES UR STRIP-MCNC: NEGATIVE MG/DL
LEUKOCYTE ESTERASE UR QL STRIP: NEGATIVE
LYMPHOCYTES # BLD: 1.8 K/MCL (ref 1–4.8)
LYMPHOCYTES NFR BLD: 22 %
MCH RBC QN AUTO: 18.9 PG (ref 26–34)
MCHC RBC AUTO-ENTMCNC: 30.2 G/DL (ref 32–36.5)
MCV RBC AUTO: 62.6 FL (ref 78–100)
MONOCYTES # BLD: 0.3 K/MCL (ref 0.3–0.9)
MONOCYTES NFR BLD: 4 %
NEUTROPHILS # BLD: 5.5 K/MCL (ref 1.8–7.7)
NEUTROPHILS NFR BLD: 69 %
NITRITE UR QL STRIP: NEGATIVE
NRBC BLD MANUAL-RTO: 0 /100 WBC
PH UR STRIP: 7 [PH] (ref 5–7)
PLATELET # BLD AUTO: 416 K/MCL (ref 140–450)
POTASSIUM SERPL-SCNC: 3.7 MMOL/L (ref 3.4–5.1)
PROT UR STRIP-MCNC: NEGATIVE MG/DL
RAINBOW EXTRA TUBES HOLD SPECIMEN: NORMAL
RBC # BLD: 5.61 MIL/MCL (ref 4–5.2)
SODIUM SERPL-SCNC: 137 MMOL/L (ref 135–145)
SP GR UR STRIP: <1.005 (ref 1–1.03)
UROBILINOGEN UR STRIP-MCNC: 0.2 MG/DL
WBC # BLD: 8 K/MCL (ref 4.2–11)

## 2022-02-11 PROCEDURE — 10004651 HB RX, NO CHARGE ITEM: Performed by: EMERGENCY MEDICINE

## 2022-02-11 PROCEDURE — 80048 BASIC METABOLIC PNL TOTAL CA: CPT | Performed by: EMERGENCY MEDICINE

## 2022-02-11 PROCEDURE — 84703 CHORIONIC GONADOTROPIN ASSAY: CPT

## 2022-02-11 PROCEDURE — 81003 URINALYSIS AUTO W/O SCOPE: CPT | Performed by: EMERGENCY MEDICINE

## 2022-02-11 PROCEDURE — 81025 URINE PREGNANCY TEST: CPT | Performed by: EMERGENCY MEDICINE

## 2022-02-11 PROCEDURE — 99284 EMERGENCY DEPT VISIT MOD MDM: CPT

## 2022-02-11 PROCEDURE — G1004 CDSM NDSC: HCPCS

## 2022-02-11 PROCEDURE — 70450 CT HEAD/BRAIN W/O DYE: CPT

## 2022-02-11 PROCEDURE — 85025 COMPLETE CBC W/AUTO DIFF WBC: CPT | Performed by: EMERGENCY MEDICINE

## 2022-02-11 RX ORDER — ACETAMINOPHEN 500 MG
1000 TABLET ORAL ONCE
Status: COMPLETED | OUTPATIENT
Start: 2022-02-11 | End: 2022-02-11

## 2022-02-11 RX ADMIN — ACETAMINOPHEN 1000 MG: 500 TABLET ORAL at 15:14

## 2022-02-11 ASSESSMENT — PAIN SCALES - GENERAL: PAINLEVEL_OUTOF10: 8

## 2022-03-09 DIAGNOSIS — Z01.812 PRE-PROCEDURAL LABORATORY EXAMINATION: Primary | ICD-10-CM

## 2022-03-24 ENCOUNTER — HOSPITAL ENCOUNTER (OUTPATIENT)
Dept: GASTROENTEROLOGY | Age: 37
End: 2022-03-24
Attending: INTERNAL MEDICINE

## 2022-07-06 DIAGNOSIS — Z01.812 PRE-PROCEDURAL LABORATORY EXAMINATION: Primary | ICD-10-CM

## 2022-07-12 ENCOUNTER — APPOINTMENT (OUTPATIENT)
Dept: GENERAL RADIOLOGY | Age: 37
End: 2022-07-12
Attending: EMERGENCY MEDICINE

## 2022-07-12 ENCOUNTER — HOSPITAL ENCOUNTER (EMERGENCY)
Age: 37
Discharge: HOME | End: 2022-07-12
Attending: EMERGENCY MEDICINE

## 2022-07-12 ENCOUNTER — APPOINTMENT (OUTPATIENT)
Dept: CT IMAGING | Age: 37
End: 2022-07-12
Attending: EMERGENCY MEDICINE

## 2022-07-12 VITALS
HEART RATE: 64 BPM | RESPIRATION RATE: 16 BRPM | TEMPERATURE: 98.2 F | OXYGEN SATURATION: 97 % | SYSTOLIC BLOOD PRESSURE: 99 MMHG | DIASTOLIC BLOOD PRESSURE: 68 MMHG

## 2022-07-12 DIAGNOSIS — R55 SYNCOPE, UNSPECIFIED SYNCOPE TYPE: Primary | ICD-10-CM

## 2022-07-12 DIAGNOSIS — K59.00 CONSTIPATION, UNSPECIFIED CONSTIPATION TYPE: ICD-10-CM

## 2022-07-12 LAB
ANION GAP SERPL CALC-SCNC: 11 MMOL/L (ref 7–19)
ATRIAL RATE (BPM): 57
BASOPHILS # BLD: 0 K/MCL (ref 0–0.3)
BASOPHILS NFR BLD: 1 %
BUN SERPL-MCNC: 7 MG/DL (ref 6–20)
BUN/CREAT SERPL: 10 (ref 7–25)
CALCIUM SERPL-MCNC: 9.2 MG/DL (ref 8.4–10.2)
CHLORIDE SERPL-SCNC: 107 MMOL/L (ref 97–110)
CO2 SERPL-SCNC: 24 MMOL/L (ref 21–32)
CREAT SERPL-MCNC: 0.73 MG/DL (ref 0.51–0.95)
DEPRECATED RDW RBC: 35.7 FL (ref 39–50)
EOSINOPHIL # BLD: 0.2 K/MCL (ref 0–0.5)
EOSINOPHIL NFR BLD: 3 %
ERYTHROCYTE [DISTWIDTH] IN BLOOD: 16.9 % (ref 11–15)
FASTING DURATION TIME PATIENT: NORMAL H
FLUAV RNA RESP QL NAA+PROBE: NOT DETECTED
FLUBV RNA RESP QL NAA+PROBE: NOT DETECTED
GFR SERPLBLD BASED ON 1.73 SQ M-ARVRAT: >90 ML/MIN
GLUCOSE SERPL-MCNC: 97 MG/DL (ref 70–99)
HCG UR QL: NEGATIVE
HCT VFR BLD CALC: 33.6 % (ref 36–46.5)
HGB BLD-MCNC: 10.4 G/DL (ref 12–15.5)
IMM GRANULOCYTES # BLD AUTO: 0 K/MCL (ref 0–0.2)
IMM GRANULOCYTES # BLD: 0 %
LYMPHOCYTES # BLD: 1.6 K/MCL (ref 1–4.8)
LYMPHOCYTES NFR BLD: 28 %
MAGNESIUM SERPL-MCNC: 2.2 MG/DL (ref 1.7–2.4)
MCH RBC QN AUTO: 19.3 PG (ref 26–34)
MCHC RBC AUTO-ENTMCNC: 31 G/DL (ref 32–36.5)
MCV RBC AUTO: 62.3 FL (ref 78–100)
MONOCYTES # BLD: 0.2 K/MCL (ref 0.3–0.9)
MONOCYTES NFR BLD: 4 %
NEUTROPHILS # BLD: 3.8 K/MCL (ref 1.8–7.7)
NEUTROPHILS NFR BLD: 64 %
NRBC BLD MANUAL-RTO: 0 /100 WBC
P AXIS (DEGREES): 41
PLATELET # BLD AUTO: 335 K/MCL (ref 140–450)
POTASSIUM SERPL-SCNC: 3.7 MMOL/L (ref 3.4–5.1)
PR-INTERVAL (MSEC): 142
QRS-INTERVAL (MSEC): 84
QT-INTERVAL (MSEC): 438
QTC: 426
R AXIS (DEGREES): 43
RAINBOW EXTRA TUBES HOLD SPECIMEN: NORMAL
RBC # BLD: 5.39 MIL/MCL (ref 4–5.2)
REPORT TEXT: NORMAL
RSV AG NPH QL IA.RAPID: NOT DETECTED
SARS-COV-2 RNA RESP QL NAA+PROBE: NOT DETECTED
SERVICE CMNT-IMP: NORMAL
SERVICE CMNT-IMP: NORMAL
SODIUM SERPL-SCNC: 138 MMOL/L (ref 135–145)
T AXIS (DEGREES): 53
TROPONIN I SERPL DL<=0.01 NG/ML-MCNC: <4 NG/L
VENTRICULAR RATE EKG/MIN (BPM): 57
WBC # BLD: 5.9 K/MCL (ref 4.2–11)

## 2022-07-12 PROCEDURE — C9803 HOPD COVID-19 SPEC COLLECT: HCPCS

## 2022-07-12 PROCEDURE — 80048 BASIC METABOLIC PNL TOTAL CA: CPT | Performed by: EMERGENCY MEDICINE

## 2022-07-12 PROCEDURE — 93010 ELECTROCARDIOGRAM REPORT: CPT | Performed by: INTERNAL MEDICINE

## 2022-07-12 PROCEDURE — 85025 COMPLETE CBC W/AUTO DIFF WBC: CPT | Performed by: EMERGENCY MEDICINE

## 2022-07-12 PROCEDURE — 10002805 HB CONTRAST AGENT: Performed by: EMERGENCY MEDICINE

## 2022-07-12 PROCEDURE — 84703 CHORIONIC GONADOTROPIN ASSAY: CPT

## 2022-07-12 PROCEDURE — 0241U COVID/FLU/RSV PANEL: CPT | Performed by: EMERGENCY MEDICINE

## 2022-07-12 PROCEDURE — 71046 X-RAY EXAM CHEST 2 VIEWS: CPT

## 2022-07-12 PROCEDURE — 74177 CT ABD & PELVIS W/CONTRAST: CPT

## 2022-07-12 PROCEDURE — 84484 ASSAY OF TROPONIN QUANT: CPT | Performed by: EMERGENCY MEDICINE

## 2022-07-12 PROCEDURE — 83735 ASSAY OF MAGNESIUM: CPT | Performed by: EMERGENCY MEDICINE

## 2022-07-12 PROCEDURE — 36415 COLL VENOUS BLD VENIPUNCTURE: CPT

## 2022-07-12 PROCEDURE — 93005 ELECTROCARDIOGRAM TRACING: CPT | Performed by: EMERGENCY MEDICINE

## 2022-07-12 PROCEDURE — 99284 EMERGENCY DEPT VISIT MOD MDM: CPT

## 2022-07-12 PROCEDURE — G1004 CDSM NDSC: HCPCS

## 2022-07-12 RX ORDER — POLYETHYLENE GLYCOL 3350 17 G/17G
17 POWDER, FOR SOLUTION ORAL DAILY
Qty: 119 G | Refills: 0 | Status: SHIPPED | OUTPATIENT
Start: 2022-07-12 | End: 2022-07-19

## 2022-07-12 RX ADMIN — IOHEXOL 78 ML: 350 INJECTION, SOLUTION INTRAVENOUS at 13:31

## 2022-07-14 ENCOUNTER — APPOINTMENT (OUTPATIENT)
Dept: GASTROENTEROLOGY | Age: 37
End: 2022-07-14
Attending: INTERNAL MEDICINE

## 2022-08-03 ENCOUNTER — TELEPHONE (OUTPATIENT)
Dept: BEHAVIORAL HEALTH | Age: 37
End: 2022-08-03

## 2022-08-03 ENCOUNTER — BEHAVIORAL HEALTH (OUTPATIENT)
Dept: BEHAVIORAL HEALTH | Age: 37
End: 2022-08-03

## 2022-08-03 DIAGNOSIS — F31.32 BIPOLAR AFFECTIVE DISORDER, CURRENTLY DEPRESSED, MODERATE (CMD): Primary | ICD-10-CM

## 2022-08-03 DIAGNOSIS — F43.10 PTSD (POST-TRAUMATIC STRESS DISORDER): ICD-10-CM

## 2022-08-03 DIAGNOSIS — F41.9 ANXIETY: ICD-10-CM

## 2022-08-03 DIAGNOSIS — R41.840 DIFFICULTY CONCENTRATING: ICD-10-CM

## 2022-08-03 DIAGNOSIS — F51.01 PRIMARY INSOMNIA: ICD-10-CM

## 2022-08-03 PROCEDURE — 90792 PSYCH DIAG EVAL W/MED SRVCS: CPT | Performed by: PSYCHIATRY & NEUROLOGY

## 2022-08-03 RX ORDER — MIRTAZAPINE 15 MG/1
15 TABLET, FILM COATED ORAL NIGHTLY
Qty: 30 TABLET | Refills: 0 | Status: SHIPPED | OUTPATIENT
Start: 2022-08-03 | End: 2022-08-31 | Stop reason: ALTCHOICE

## 2022-08-03 RX ORDER — CLONAZEPAM 0.5 MG/1
0.5 TABLET ORAL 3 TIMES DAILY PRN
Qty: 90 TABLET | Refills: 0 | Status: SHIPPED | OUTPATIENT
Start: 2022-08-03 | End: 2022-08-31 | Stop reason: SDUPTHER

## 2022-08-03 RX ORDER — DULOXETINE 40 MG/1
CAPSULE, DELAYED RELEASE ORAL
COMMUNITY
End: 2022-08-31 | Stop reason: ALTCHOICE

## 2022-08-03 RX ORDER — PRAZOSIN HYDROCHLORIDE 2 MG/1
4 CAPSULE ORAL NIGHTLY
Qty: 60 CAPSULE | Refills: 0 | Status: SHIPPED | OUTPATIENT
Start: 2022-08-03 | End: 2022-08-31 | Stop reason: SDUPTHER

## 2022-08-19 ENCOUNTER — HOSPITAL ENCOUNTER (EMERGENCY)
Age: 37
Discharge: LEFT BEFORE DISCHARGE | End: 2022-08-19

## 2022-08-19 VITALS
BODY MASS INDEX: 43.18 KG/M2 | HEART RATE: 78 BPM | HEIGHT: 65 IN | OXYGEN SATURATION: 100 % | RESPIRATION RATE: 18 BRPM | TEMPERATURE: 98.6 F | SYSTOLIC BLOOD PRESSURE: 124 MMHG | DIASTOLIC BLOOD PRESSURE: 79 MMHG

## 2022-08-19 LAB
ALBUMIN SERPL-MCNC: 3.6 G/DL (ref 3.6–5.1)
ALBUMIN/GLOB SERPL: 1 {RATIO} (ref 1–2.4)
ALP SERPL-CCNC: 50 UNITS/L (ref 45–117)
ALT SERPL-CCNC: 16 UNITS/L
ANION GAP SERPL CALC-SCNC: 8 MMOL/L (ref 7–19)
AST SERPL-CCNC: 11 UNITS/L
BASOPHILS # BLD: 0.1 K/MCL (ref 0–0.3)
BASOPHILS NFR BLD: 1 %
BILIRUB SERPL-MCNC: 0.5 MG/DL (ref 0.2–1)
BUN SERPL-MCNC: 8 MG/DL (ref 6–20)
BUN/CREAT SERPL: 10 (ref 7–25)
CALCIUM SERPL-MCNC: 8.9 MG/DL (ref 8.4–10.2)
CHLORIDE SERPL-SCNC: 111 MMOL/L (ref 97–110)
CO2 SERPL-SCNC: 24 MMOL/L (ref 21–32)
CREAT SERPL-MCNC: 0.78 MG/DL (ref 0.51–0.95)
DEPRECATED RDW RBC: 35.8 FL (ref 39–50)
EOSINOPHIL # BLD: 0.2 K/MCL (ref 0–0.5)
EOSINOPHIL NFR BLD: 3 %
ERYTHROCYTE [DISTWIDTH] IN BLOOD: 16.4 % (ref 11–15)
FASTING DURATION TIME PATIENT: ABNORMAL H
GFR SERPLBLD BASED ON 1.73 SQ M-ARVRAT: >90 ML/MIN
GLOBULIN SER-MCNC: 3.7 G/DL (ref 2–4)
GLUCOSE SERPL-MCNC: 97 MG/DL (ref 70–99)
HCT VFR BLD CALC: 35.5 % (ref 36–46.5)
HGB BLD-MCNC: 10.9 G/DL (ref 12–15.5)
IMM GRANULOCYTES # BLD AUTO: 0 K/MCL (ref 0–0.2)
IMM GRANULOCYTES # BLD: 0 %
LIPASE SERPL-CCNC: 113 UNITS/L (ref 73–393)
LYMPHOCYTES # BLD: 2 K/MCL (ref 1–4.8)
LYMPHOCYTES NFR BLD: 30 %
MCH RBC QN AUTO: 19.3 PG (ref 26–34)
MCHC RBC AUTO-ENTMCNC: 30.7 G/DL (ref 32–36.5)
MCV RBC AUTO: 62.8 FL (ref 78–100)
MONOCYTES # BLD: 0.2 K/MCL (ref 0.3–0.9)
MONOCYTES NFR BLD: 3 %
NEUTROPHILS # BLD: 4.1 K/MCL (ref 1.8–7.7)
NEUTROPHILS NFR BLD: 63 %
NRBC BLD MANUAL-RTO: 0 /100 WBC
PLATELET # BLD AUTO: 335 K/MCL (ref 140–450)
POTASSIUM SERPL-SCNC: 3.8 MMOL/L (ref 3.4–5.1)
PROT SERPL-MCNC: 7.3 G/DL (ref 6.4–8.2)
RBC # BLD: 5.65 MIL/MCL (ref 4–5.2)
SODIUM SERPL-SCNC: 139 MMOL/L (ref 135–145)
WBC # BLD: 6.5 K/MCL (ref 4.2–11)

## 2022-08-19 PROCEDURE — 83690 ASSAY OF LIPASE: CPT | Performed by: EMERGENCY MEDICINE

## 2022-08-19 PROCEDURE — 10003627 HB COUNTER ED NO SERVICE

## 2022-08-19 PROCEDURE — 36415 COLL VENOUS BLD VENIPUNCTURE: CPT

## 2022-08-19 PROCEDURE — 80053 COMPREHEN METABOLIC PANEL: CPT | Performed by: EMERGENCY MEDICINE

## 2022-08-19 PROCEDURE — 85025 COMPLETE CBC W/AUTO DIFF WBC: CPT | Performed by: EMERGENCY MEDICINE

## 2022-08-19 ASSESSMENT — PAIN SCALES - GENERAL: PAINLEVEL_OUTOF10: 8

## 2022-08-20 LAB
RAINBOW EXTRA TUBES HOLD SPECIMEN: NORMAL

## 2022-08-31 ENCOUNTER — BEHAVIORAL HEALTH (OUTPATIENT)
Dept: BEHAVIORAL HEALTH | Age: 37
End: 2022-08-31

## 2022-08-31 DIAGNOSIS — F43.10 PTSD (POST-TRAUMATIC STRESS DISORDER): ICD-10-CM

## 2022-08-31 DIAGNOSIS — F51.01 PRIMARY INSOMNIA: ICD-10-CM

## 2022-08-31 DIAGNOSIS — F31.32 BIPOLAR AFFECTIVE DISORDER, CURRENTLY DEPRESSED, MODERATE (CMD): Primary | ICD-10-CM

## 2022-08-31 DIAGNOSIS — F41.9 ANXIETY: ICD-10-CM

## 2022-08-31 DIAGNOSIS — R41.840 DIFFICULTY CONCENTRATING: ICD-10-CM

## 2022-08-31 PROCEDURE — 99214 OFFICE O/P EST MOD 30 MIN: CPT | Performed by: PSYCHIATRY & NEUROLOGY

## 2022-08-31 RX ORDER — CLONAZEPAM 0.5 MG/1
0.5 TABLET ORAL 3 TIMES DAILY PRN
Qty: 90 TABLET | Refills: 0 | Status: SHIPPED | OUTPATIENT
Start: 2022-08-31 | End: 2022-09-28 | Stop reason: SDUPTHER

## 2022-08-31 RX ORDER — DOXEPIN HYDROCHLORIDE 50 MG/1
50 CAPSULE ORAL NIGHTLY
Qty: 30 CAPSULE | Refills: 0 | Status: SHIPPED | OUTPATIENT
Start: 2022-08-31 | End: 2022-09-28 | Stop reason: SDUPTHER

## 2022-08-31 RX ORDER — PRAZOSIN HYDROCHLORIDE 2 MG/1
4 CAPSULE ORAL NIGHTLY
Qty: 60 CAPSULE | Refills: 0 | Status: SHIPPED | OUTPATIENT
Start: 2022-08-31 | End: 2022-09-28 | Stop reason: SDUPTHER

## 2022-09-07 ENCOUNTER — HOSPITAL ENCOUNTER (EMERGENCY)
Age: 37
Discharge: HOME | End: 2022-09-07
Attending: EMERGENCY MEDICINE

## 2022-09-07 ENCOUNTER — APPOINTMENT (OUTPATIENT)
Dept: GENERAL RADIOLOGY | Age: 37
End: 2022-09-07
Attending: EMERGENCY MEDICINE

## 2022-09-07 VITALS
WEIGHT: 244.49 LBS | HEART RATE: 61 BPM | HEIGHT: 65 IN | DIASTOLIC BLOOD PRESSURE: 67 MMHG | RESPIRATION RATE: 18 BRPM | SYSTOLIC BLOOD PRESSURE: 98 MMHG | TEMPERATURE: 98.2 F | BODY MASS INDEX: 40.73 KG/M2 | OXYGEN SATURATION: 97 %

## 2022-09-07 DIAGNOSIS — R07.9 CHEST PAIN, UNSPECIFIED TYPE: Primary | ICD-10-CM

## 2022-09-07 LAB
ALBUMIN SERPL-MCNC: 3.7 G/DL (ref 3.6–5.1)
ALBUMIN/GLOB SERPL: 1 {RATIO} (ref 1–2.4)
ALP SERPL-CCNC: 53 UNITS/L (ref 45–117)
ALT SERPL-CCNC: 19 UNITS/L
ANION GAP SERPL CALC-SCNC: 10 MMOL/L (ref 7–19)
AST SERPL-CCNC: 14 UNITS/L
BASOPHILS # BLD: 0.1 K/MCL (ref 0–0.3)
BASOPHILS NFR BLD: 1 %
BILIRUB SERPL-MCNC: 0.4 MG/DL (ref 0.2–1)
BUN SERPL-MCNC: 3 MG/DL (ref 6–20)
BUN/CREAT SERPL: 4 (ref 7–25)
CALCIUM SERPL-MCNC: 9.1 MG/DL (ref 8.4–10.2)
CHLORIDE SERPL-SCNC: 113 MMOL/L (ref 97–110)
CO2 SERPL-SCNC: 25 MMOL/L (ref 21–32)
CREAT SERPL-MCNC: 0.82 MG/DL (ref 0.51–0.95)
D DIMER PPP FEU-MCNC: 0.31 MG/L (FEU)
DEPRECATED RDW RBC: 35.4 FL (ref 39–50)
EOSINOPHIL # BLD: 0.3 K/MCL (ref 0–0.5)
EOSINOPHIL NFR BLD: 4 %
ERYTHROCYTE [DISTWIDTH] IN BLOOD: 15.9 % (ref 11–15)
FASTING DURATION TIME PATIENT: ABNORMAL H
GFR SERPLBLD BASED ON 1.73 SQ M-ARVRAT: >90 ML/MIN
GLOBULIN SER-MCNC: 3.7 G/DL (ref 2–4)
GLUCOSE SERPL-MCNC: 92 MG/DL (ref 70–99)
HCG SERPL QL: NEGATIVE
HCT VFR BLD CALC: 37.9 % (ref 36–46.5)
HGB BLD-MCNC: 11.3 G/DL (ref 12–15.5)
IMM GRANULOCYTES # BLD AUTO: 0 K/MCL (ref 0–0.2)
IMM GRANULOCYTES # BLD: 0 %
LYMPHOCYTES # BLD: 1.7 K/MCL (ref 1–4.8)
LYMPHOCYTES NFR BLD: 26 %
MAGNESIUM SERPL-MCNC: 2.3 MG/DL (ref 1.7–2.4)
MCH RBC QN AUTO: 19 PG (ref 26–34)
MCHC RBC AUTO-ENTMCNC: 29.8 G/DL (ref 32–36.5)
MCV RBC AUTO: 63.6 FL (ref 78–100)
MONOCYTES # BLD: 0.2 K/MCL (ref 0.3–0.9)
MONOCYTES NFR BLD: 4 %
NEUTROPHILS # BLD: 4.1 K/MCL (ref 1.8–7.7)
NEUTROPHILS NFR BLD: 65 %
NRBC BLD MANUAL-RTO: 0 /100 WBC
PLATELET # BLD AUTO: 359 K/MCL (ref 140–450)
POTASSIUM SERPL-SCNC: 3.7 MMOL/L (ref 3.4–5.1)
PROT SERPL-MCNC: 7.4 G/DL (ref 6.4–8.2)
RBC # BLD: 5.96 MIL/MCL (ref 4–5.2)
SODIUM SERPL-SCNC: 144 MMOL/L (ref 135–145)
TROPONIN I SERPL DL<=0.01 NG/ML-MCNC: <4 NG/L
TROPONIN I SERPL DL<=0.01 NG/ML-MCNC: <4 NG/L
WBC # BLD: 6.4 K/MCL (ref 4.2–11)

## 2022-09-07 PROCEDURE — 83735 ASSAY OF MAGNESIUM: CPT | Performed by: EMERGENCY MEDICINE

## 2022-09-07 PROCEDURE — 96375 TX/PRO/DX INJ NEW DRUG ADDON: CPT

## 2022-09-07 PROCEDURE — 93010 ELECTROCARDIOGRAM REPORT: CPT | Performed by: INTERNAL MEDICINE

## 2022-09-07 PROCEDURE — 10002801 HB RX 250 W/O HCPCS: Performed by: EMERGENCY MEDICINE

## 2022-09-07 PROCEDURE — 85379 FIBRIN DEGRADATION QUANT: CPT | Performed by: EMERGENCY MEDICINE

## 2022-09-07 PROCEDURE — 71046 X-RAY EXAM CHEST 2 VIEWS: CPT

## 2022-09-07 PROCEDURE — 10002800 HB RX 250 W HCPCS: Performed by: EMERGENCY MEDICINE

## 2022-09-07 PROCEDURE — 93005 ELECTROCARDIOGRAM TRACING: CPT | Performed by: EMERGENCY MEDICINE

## 2022-09-07 PROCEDURE — 85025 COMPLETE CBC W/AUTO DIFF WBC: CPT | Performed by: EMERGENCY MEDICINE

## 2022-09-07 PROCEDURE — 84703 CHORIONIC GONADOTROPIN ASSAY: CPT | Performed by: EMERGENCY MEDICINE

## 2022-09-07 PROCEDURE — 96374 THER/PROPH/DIAG INJ IV PUSH: CPT

## 2022-09-07 PROCEDURE — 84484 ASSAY OF TROPONIN QUANT: CPT | Performed by: EMERGENCY MEDICINE

## 2022-09-07 PROCEDURE — 99285 EMERGENCY DEPT VISIT HI MDM: CPT

## 2022-09-07 PROCEDURE — 80053 COMPREHEN METABOLIC PANEL: CPT | Performed by: EMERGENCY MEDICINE

## 2022-09-07 RX ORDER — LORAZEPAM 2 MG/ML
1 INJECTION INTRAMUSCULAR ONCE
Status: DISCONTINUED | OUTPATIENT
Start: 2022-09-07 | End: 2022-09-07 | Stop reason: SDUPTHER

## 2022-09-07 RX ORDER — LORAZEPAM 1 MG/1
1 TABLET ORAL ONCE
Status: DISCONTINUED | OUTPATIENT
Start: 2022-09-07 | End: 2022-09-07

## 2022-09-07 RX ORDER — LORAZEPAM 2 MG/ML
1 INJECTION INTRAMUSCULAR ONCE
Status: COMPLETED | OUTPATIENT
Start: 2022-09-07 | End: 2022-09-07

## 2022-09-07 RX ADMIN — FENTANYL CITRATE 50 MCG: 50 INJECTION INTRAMUSCULAR; INTRAVENOUS at 14:44

## 2022-09-07 RX ADMIN — LORAZEPAM 1 MG: 2 INJECTION, SOLUTION INTRAMUSCULAR; INTRAVENOUS at 16:15

## 2022-09-07 ASSESSMENT — HEART SCORE
AGE: LESS THAN OR EQUAL TO 45
TROPONIN: EQUAL OR LESS THAN NORMAL LIMIT
EKG: NORMAL
HISTORY: SLIGHTLY SUSPICIOUS
HEART SCORE: 0
RISK FACTORS: NO RISK FACTORS KNOWN

## 2022-09-07 ASSESSMENT — PAIN SCALES - GENERAL: PAINLEVEL_OUTOF10: 8

## 2022-09-08 LAB
ATRIAL RATE (BPM): 69
P AXIS (DEGREES): 36
PR-INTERVAL (MSEC): 154
QRS-INTERVAL (MSEC): 82
QT-INTERVAL (MSEC): 372
QTC: 398
R AXIS (DEGREES): 12
REPORT TEXT: NORMAL
T AXIS (DEGREES): 36
VENTRICULAR RATE EKG/MIN (BPM): 69

## 2022-09-28 ENCOUNTER — BEHAVIORAL HEALTH (OUTPATIENT)
Dept: BEHAVIORAL HEALTH | Age: 37
End: 2022-09-28

## 2022-09-28 ENCOUNTER — TELEPHONE (OUTPATIENT)
Dept: BEHAVIORAL HEALTH | Age: 37
End: 2022-09-28

## 2022-09-28 DIAGNOSIS — F41.9 ANXIETY: ICD-10-CM

## 2022-09-28 DIAGNOSIS — R41.840 DIFFICULTY CONCENTRATING: ICD-10-CM

## 2022-09-28 DIAGNOSIS — F31.32 BIPOLAR AFFECTIVE DISORDER, CURRENTLY DEPRESSED, MODERATE (CMD): Primary | ICD-10-CM

## 2022-09-28 DIAGNOSIS — F43.10 PTSD (POST-TRAUMATIC STRESS DISORDER): ICD-10-CM

## 2022-09-28 DIAGNOSIS — F51.01 PRIMARY INSOMNIA: ICD-10-CM

## 2022-09-28 PROCEDURE — 99214 OFFICE O/P EST MOD 30 MIN: CPT | Performed by: PSYCHIATRY & NEUROLOGY

## 2022-09-28 RX ORDER — CLONAZEPAM 0.5 MG/1
0.5 TABLET ORAL 2 TIMES DAILY PRN
Qty: 60 TABLET | Refills: 0 | Status: SHIPPED | OUTPATIENT
Start: 2022-09-28 | End: 2022-10-26 | Stop reason: SDUPTHER

## 2022-09-28 RX ORDER — PRAZOSIN HYDROCHLORIDE 2 MG/1
6 CAPSULE ORAL NIGHTLY
Qty: 90 CAPSULE | Refills: 0 | Status: SHIPPED | OUTPATIENT
Start: 2022-09-28 | End: 2022-10-26 | Stop reason: SDUPTHER

## 2022-09-28 RX ORDER — DOXEPIN HYDROCHLORIDE 50 MG/1
50 CAPSULE ORAL NIGHTLY
Qty: 30 CAPSULE | Refills: 0 | Status: SHIPPED | OUTPATIENT
Start: 2022-09-28 | End: 2022-10-26 | Stop reason: SDUPTHER

## 2022-09-28 RX ORDER — LAMOTRIGINE 25 MG/1
TABLET ORAL
Qty: 60 TABLET | Refills: 0 | Status: SHIPPED | OUTPATIENT
Start: 2022-09-28 | End: 2022-10-26 | Stop reason: SDUPTHER

## 2022-09-29 ENCOUNTER — TELEPHONE (OUTPATIENT)
Dept: NEUROLOGY | Age: 37
End: 2022-09-29

## 2022-09-29 DIAGNOSIS — Z01.812 PRE-PROCEDURAL LABORATORY EXAMINATION: Primary | ICD-10-CM

## 2022-10-06 ENCOUNTER — OFFICE VISIT (OUTPATIENT)
Dept: NEUROLOGY | Age: 37
End: 2022-10-06

## 2022-10-06 ENCOUNTER — HOSPITAL ENCOUNTER (OUTPATIENT)
Dept: GASTROENTEROLOGY | Age: 37
End: 2022-10-06
Attending: INTERNAL MEDICINE

## 2022-10-06 VITALS
DIASTOLIC BLOOD PRESSURE: 72 MMHG | HEART RATE: 77 BPM | OXYGEN SATURATION: 98 % | SYSTOLIC BLOOD PRESSURE: 105 MMHG | WEIGHT: 249.12 LBS | BODY MASS INDEX: 41.51 KG/M2 | HEIGHT: 65 IN | RESPIRATION RATE: 16 BRPM

## 2022-10-06 DIAGNOSIS — F44.5 PSYCHOGENIC NONEPILEPTIC SEIZURE: ICD-10-CM

## 2022-10-06 DIAGNOSIS — G43.101 MIGRAINE WITH AURA AND WITH STATUS MIGRAINOSUS, NOT INTRACTABLE: Primary | ICD-10-CM

## 2022-10-06 PROCEDURE — 99245 OFF/OP CONSLTJ NEW/EST HI 55: CPT | Performed by: PSYCHIATRY & NEUROLOGY

## 2022-10-06 RX ORDER — GABAPENTIN 100 MG/1
CAPSULE ORAL
COMMUNITY
End: 2022-10-06 | Stop reason: ALTCHOICE

## 2022-10-06 RX ORDER — CARISOPRODOL 250 MG/1
TABLET ORAL
COMMUNITY
End: 2022-10-06 | Stop reason: ALTCHOICE

## 2022-10-06 RX ORDER — DULOXETIN HYDROCHLORIDE 20 MG/1
20 CAPSULE, DELAYED RELEASE ORAL DAILY
COMMUNITY
Start: 2022-09-10 | End: 2022-10-06 | Stop reason: SDUPTHER

## 2022-10-06 RX ORDER — GABAPENTIN 300 MG/1
CAPSULE ORAL
COMMUNITY
End: 2022-10-06 | Stop reason: ALTCHOICE

## 2022-10-06 RX ORDER — DOXEPIN HYDROCHLORIDE 50 MG/1
CAPSULE ORAL
COMMUNITY
End: 2022-10-06 | Stop reason: SDUPTHER

## 2022-10-06 RX ORDER — GABAPENTIN 400 MG/1
1 CAPSULE ORAL 4 TIMES DAILY
COMMUNITY
Start: 2022-06-13 | End: 2022-10-06 | Stop reason: SDUPTHER

## 2022-10-06 RX ORDER — PHENOL 1.4 %
AEROSOL, SPRAY (ML) MUCOUS MEMBRANE
COMMUNITY
End: 2022-10-06 | Stop reason: ALTCHOICE

## 2022-10-06 RX ORDER — TOPIRAMATE 50 MG/1
TABLET, FILM COATED ORAL
COMMUNITY
End: 2022-10-06 | Stop reason: ALTCHOICE

## 2022-10-06 RX ORDER — DULOXETIN HYDROCHLORIDE 20 MG/1
CAPSULE, DELAYED RELEASE ORAL
COMMUNITY
Start: 2022-06-13 | End: 2022-11-08 | Stop reason: ALTCHOICE

## 2022-10-06 RX ORDER — VENLAFAXINE HYDROCHLORIDE 150 MG/1
CAPSULE, EXTENDED RELEASE ORAL
COMMUNITY
End: 2022-10-06 | Stop reason: ALTCHOICE

## 2022-10-06 RX ORDER — PRAZOSIN HYDROCHLORIDE 2 MG/1
CAPSULE ORAL
COMMUNITY
End: 2022-10-06 | Stop reason: SDUPTHER

## 2022-10-06 RX ORDER — TOPIRAMATE 100 MG/1
1 TABLET, FILM COATED ORAL 2 TIMES DAILY
COMMUNITY
Start: 2022-06-13 | End: 2022-10-06 | Stop reason: ALTCHOICE

## 2022-10-06 RX ORDER — CLONAZEPAM 0.5 MG/1
1 TABLET ORAL 3 TIMES DAILY PRN
COMMUNITY
Start: 2022-08-01 | End: 2022-10-06 | Stop reason: SDUPTHER

## 2022-10-06 RX ORDER — SUMATRIPTAN 100 MG/1
100 TABLET, FILM COATED ORAL
COMMUNITY
Start: 2022-06-13 | End: 2022-10-06 | Stop reason: SDUPTHER

## 2022-10-06 RX ORDER — DICYCLOMINE HYDROCHLORIDE 10 MG/1
CAPSULE ORAL
COMMUNITY
End: 2022-10-06 | Stop reason: ALTCHOICE

## 2022-10-06 RX ORDER — ONDANSETRON 4 MG/1
TABLET, ORALLY DISINTEGRATING ORAL
COMMUNITY
End: 2022-11-08 | Stop reason: SDUPTHER

## 2022-10-06 RX ORDER — IBUPROFEN 800 MG/1
TABLET ORAL
COMMUNITY
End: 2022-10-06 | Stop reason: ALTCHOICE

## 2022-10-06 RX ORDER — CETIRIZINE HYDROCHLORIDE 10 MG/1
10 TABLET ORAL DAILY PRN
Status: ON HOLD | COMMUNITY
Start: 2022-08-11 | End: 2023-02-03

## 2022-10-06 RX ORDER — TOPIRAMATE 25 MG/1
TABLET ORAL
COMMUNITY
End: 2022-10-06 | Stop reason: ALTCHOICE

## 2022-10-06 RX ORDER — GABAPENTIN 400 MG/1
400 CAPSULE ORAL 4 TIMES DAILY
Qty: 360 CAPSULE | Refills: 3 | Status: SHIPPED | OUTPATIENT
Start: 2022-10-06 | End: 2023-01-05 | Stop reason: DRUGHIGH

## 2022-10-06 RX ORDER — PROPRANOLOL HCL 60 MG
1 CAPSULE, EXTENDED RELEASE 24HR ORAL DAILY
COMMUNITY
Start: 2022-06-13 | End: 2023-05-30

## 2022-10-06 ASSESSMENT — PAIN SCALES - GENERAL: PAINLEVEL: 6

## 2022-10-07 PROBLEM — F44.5 PSYCHOGENIC NONEPILEPTIC SEIZURE: Status: ACTIVE | Noted: 2022-10-07

## 2022-10-07 PROBLEM — G43.101 MIGRAINE WITH AURA AND WITH STATUS MIGRAINOSUS, NOT INTRACTABLE: Status: ACTIVE | Noted: 2022-10-07

## 2022-10-11 ENCOUNTER — TELEPHONE (OUTPATIENT)
Dept: NEUROLOGY | Age: 37
End: 2022-10-11

## 2022-10-14 ENCOUNTER — TELEPHONE (OUTPATIENT)
Dept: NEUROLOGY | Age: 37
End: 2022-10-14

## 2022-10-18 ENCOUNTER — NURSE TRIAGE (OUTPATIENT)
Dept: OTHER | Age: 37
End: 2022-10-18

## 2022-10-18 ENCOUNTER — APPOINTMENT (OUTPATIENT)
Dept: CT IMAGING | Age: 37
End: 2022-10-18
Attending: EMERGENCY MEDICINE

## 2022-10-18 ENCOUNTER — APPOINTMENT (OUTPATIENT)
Dept: GENERAL RADIOLOGY | Age: 37
End: 2022-10-18
Attending: EMERGENCY MEDICINE

## 2022-10-18 ENCOUNTER — HOSPITAL ENCOUNTER (OUTPATIENT)
Age: 37
Setting detail: OBSERVATION
Discharge: HOME | End: 2022-10-19
Attending: EMERGENCY MEDICINE | Admitting: INTERNAL MEDICINE

## 2022-10-18 DIAGNOSIS — F41.9 ANXIETY: ICD-10-CM

## 2022-10-18 DIAGNOSIS — R41.82 ALTERED MENTAL STATUS, UNSPECIFIED ALTERED MENTAL STATUS TYPE: Primary | ICD-10-CM

## 2022-10-18 DIAGNOSIS — F31.32 BIPOLAR AFFECTIVE DISORDER, CURRENTLY DEPRESSED, MODERATE (CMD): ICD-10-CM

## 2022-10-18 DIAGNOSIS — F43.10 PTSD (POST-TRAUMATIC STRESS DISORDER): ICD-10-CM

## 2022-10-18 LAB
ABO + RH BLD: NORMAL
ALBUMIN SERPL-MCNC: 3.9 G/DL (ref 3.6–5.1)
ALBUMIN/GLOB SERPL: 1.1 {RATIO} (ref 1–2.4)
ALP SERPL-CCNC: 61 UNITS/L (ref 45–117)
ALT SERPL-CCNC: 19 UNITS/L
AMPHETAMINES UR QL SCN>500 NG/ML: NEGATIVE
ANION GAP SERPL CALC-SCNC: 6 MMOL/L (ref 7–19)
APAP SERPL-MCNC: <2 MCG/ML (ref 10–30)
APPEARANCE UR: CLEAR
APTT PPP: 23 SEC (ref 22–30)
AST SERPL-CCNC: 45 UNITS/L
BARBITURATES UR QL SCN>200 NG/ML: NEGATIVE
BASE EXCESS / DEFICIT, ARTERIAL - RESPIRATORY: -4 MMOL/L (ref -2–3)
BASOPHILS # BLD: 0.1 K/MCL (ref 0–0.3)
BASOPHILS NFR BLD: 1 %
BDY SITE: ABNORMAL
BENZODIAZ UR QL SCN>200 NG/ML: NEGATIVE
BILIRUB SERPL-MCNC: 0.4 MG/DL (ref 0.2–1)
BILIRUB UR QL STRIP: NEGATIVE
BLD GP AB SCN SERPL QL GEL: NEGATIVE
BODY TEMPERATURE: 37 DEGREES
BUN SERPL-MCNC: 10 MG/DL (ref 6–20)
BUN/CREAT SERPL: 10 (ref 7–25)
BZE UR QL SCN>150 NG/ML: NEGATIVE
CALCIUM SERPL-MCNC: 9.3 MG/DL (ref 8.4–10.2)
CANNABINOIDS UR QL SCN>50 NG/ML: POSITIVE
CHLORIDE SERPL-SCNC: 111 MMOL/L (ref 97–110)
CO2 SERPL-SCNC: 26 MMOL/L (ref 21–32)
COHGB MFR BLDV: 2.3 %
COLOR UR: NORMAL
CONDITION: ABNORMAL
CREAT SERPL-MCNC: 1.05 MG/DL (ref 0.51–0.95)
CRP SERPL-MCNC: 0.9 MG/DL
DEPRECATED RDW RBC: 36.7 FL (ref 39–50)
EOSINOPHIL # BLD: 0.3 K/MCL (ref 0–0.5)
EOSINOPHIL NFR BLD: 3 %
ERYTHROCYTE [DISTWIDTH] IN BLOOD: 17.7 % (ref 11–15)
ERYTHROCYTE [SEDIMENTATION RATE] IN BLOOD BY WESTERGREN METHOD: 26 MM/HR (ref 0–20)
ETHANOL SERPL-MCNC: NORMAL MG/DL
FASTING DURATION TIME PATIENT: ABNORMAL H
FIO2 ON VENT: 100 %
FLUAV RNA RESP QL NAA+PROBE: NOT DETECTED
FLUBV RNA RESP QL NAA+PROBE: NOT DETECTED
GFR SERPLBLD BASED ON 1.73 SQ M-ARVRAT: 71 ML/MIN
GLOBULIN SER-MCNC: 3.7 G/DL (ref 2–4)
GLUCOSE SERPL-MCNC: 86 MG/DL (ref 70–99)
GLUCOSE UR STRIP-MCNC: NEGATIVE MG/DL
HCO3 BLDA-SCNC: 22 MMOL/L (ref 22–28)
HCT VFR BLD CALC: 31 % (ref 36–46.5)
HCT VFR BLD CALC: 38.1 % (ref 36–46.5)
HGB BLD-MCNC: 10.3 G/DL (ref 12–15.5)
HGB BLD-MCNC: 11.7 G/DL (ref 12–15.5)
HGB UR QL STRIP: NEGATIVE
HOROWITZ INDEX BLDA+IHG-RTO: 111 MM[HG] (ref 300–500)
IMM GRANULOCYTES # BLD AUTO: 0 K/MCL (ref 0–0.2)
IMM GRANULOCYTES # BLD: 0 %
INR PPP: 1.1
KETONES UR STRIP-MCNC: NEGATIVE MG/DL
LACTATE BLDV-SCNC: 0.6 MMOL/L (ref 0–2)
LEUKOCYTE ESTERASE UR QL STRIP: NEGATIVE
LIPASE SERPL-CCNC: 102 UNITS/L (ref 73–393)
LYMPHOCYTES # BLD: 2.2 K/MCL (ref 1–4.8)
LYMPHOCYTES NFR BLD: 31 %
MAGNESIUM SERPL-MCNC: 2.3 MG/DL (ref 1.7–2.4)
MCH RBC QN AUTO: 19.5 PG (ref 26–34)
MCHC RBC AUTO-ENTMCNC: 30.7 G/DL (ref 32–36.5)
MCV RBC AUTO: 63.4 FL (ref 78–100)
METHGB MFR BLDMV: 1.6 %
MONOCYTES # BLD: 0.3 K/MCL (ref 0.3–0.9)
MONOCYTES NFR BLD: 4 %
NEUTROPHILS # BLD: 4.5 K/MCL (ref 1.8–7.7)
NEUTROPHILS NFR BLD: 61 %
NITRITE UR QL STRIP: NEGATIVE
NRBC BLD MANUAL-RTO: 0 /100 WBC
OPIATES UR QL SCN>300 NG/ML: NEGATIVE
OXYHGB MFR BLDA: 95.2 % (ref 94–98)
PCO2 BLDA: 40 MM HG (ref 32–45)
PCP UR QL SCN>25 NG/ML: NEGATIVE
PH BLDA: 7.34 UNITS (ref 7.35–7.45)
PH UR STRIP: 5 [PH] (ref 5–7)
PLATELET # BLD AUTO: 348 K/MCL (ref 140–450)
PO2 BLDA: 111 MM HG (ref 83–108)
POTASSIUM SERPL-SCNC: 4.2 MMOL/L (ref 3.4–5.1)
PROCALCITONIN SERPL IA-MCNC: <0.05 NG/ML
PROT SERPL-MCNC: 7.6 G/DL (ref 6.4–8.2)
PROT UR STRIP-MCNC: NEGATIVE MG/DL
PROTHROMBIN TIME: 11.5 SEC (ref 9.7–11.8)
RBC # BLD: 6.01 MIL/MCL (ref 4–5.2)
RSV AG NPH QL IA.RAPID: NOT DETECTED
SALICYLATES SERPL-MCNC: <2.8 MG/DL
SAO2 % BLDA: 14 % (ref 15–23)
SAO2 % BLDA: 99 % (ref 95–99)
SARS-COV-2 RNA RESP QL NAA+PROBE: NOT DETECTED
SERVICE CMNT-IMP: NORMAL
SERVICE CMNT-IMP: NORMAL
SODIUM SERPL-SCNC: 139 MMOL/L (ref 135–145)
SP GR UR STRIP: 1.01 (ref 1–1.03)
TSH SERPL-ACNC: 3.18 MCUNITS/ML (ref 0.35–5)
TYPE AND SCREEN EXPIRATION DATE: NORMAL
UROBILINOGEN UR STRIP-MCNC: 0.2 MG/DL
WBC # BLD: 7.3 K/MCL (ref 4.2–11)

## 2022-10-18 PROCEDURE — 83735 ASSAY OF MAGNESIUM: CPT | Performed by: EMERGENCY MEDICINE

## 2022-10-18 PROCEDURE — 80307 DRUG TEST PRSMV CHEM ANLYZR: CPT | Performed by: EMERGENCY MEDICINE

## 2022-10-18 PROCEDURE — G1004 CDSM NDSC: HCPCS

## 2022-10-18 PROCEDURE — 85610 PROTHROMBIN TIME: CPT | Performed by: EMERGENCY MEDICINE

## 2022-10-18 PROCEDURE — 86900 BLOOD TYPING SEROLOGIC ABO: CPT | Performed by: EMERGENCY MEDICINE

## 2022-10-18 PROCEDURE — 80143 DRUG ASSAY ACETAMINOPHEN: CPT | Performed by: EMERGENCY MEDICINE

## 2022-10-18 PROCEDURE — 71045 X-RAY EXAM CHEST 1 VIEW: CPT

## 2022-10-18 PROCEDURE — 82077 ASSAY SPEC XCP UR&BREATH IA: CPT | Performed by: EMERGENCY MEDICINE

## 2022-10-18 PROCEDURE — 96365 THER/PROPH/DIAG IV INF INIT: CPT

## 2022-10-18 PROCEDURE — 85652 RBC SED RATE AUTOMATED: CPT | Performed by: EMERGENCY MEDICINE

## 2022-10-18 PROCEDURE — 85730 THROMBOPLASTIN TIME PARTIAL: CPT | Performed by: EMERGENCY MEDICINE

## 2022-10-18 PROCEDURE — 70450 CT HEAD/BRAIN W/O DYE: CPT

## 2022-10-18 PROCEDURE — 10002807 HB RX 258: Performed by: EMERGENCY MEDICINE

## 2022-10-18 PROCEDURE — 82530 CORTISOL FREE: CPT | Performed by: EMERGENCY MEDICINE

## 2022-10-18 PROCEDURE — 81003 URINALYSIS AUTO W/O SCOPE: CPT | Performed by: EMERGENCY MEDICINE

## 2022-10-18 PROCEDURE — 13003289 HB OXYGEN THERAPY DAILY

## 2022-10-18 PROCEDURE — 86140 C-REACTIVE PROTEIN: CPT | Performed by: EMERGENCY MEDICINE

## 2022-10-18 PROCEDURE — 85025 COMPLETE CBC W/AUTO DIFF WBC: CPT | Performed by: EMERGENCY MEDICINE

## 2022-10-18 PROCEDURE — 0241U COVID/FLU/RSV PANEL: CPT | Performed by: EMERGENCY MEDICINE

## 2022-10-18 PROCEDURE — 99292 CRITICAL CARE ADDL 30 MIN: CPT | Performed by: INTERNAL MEDICINE

## 2022-10-18 PROCEDURE — 10002800 HB RX 250 W HCPCS: Performed by: EMERGENCY MEDICINE

## 2022-10-18 PROCEDURE — 96361 HYDRATE IV INFUSION ADD-ON: CPT

## 2022-10-18 PROCEDURE — 80179 DRUG ASSAY SALICYLATE: CPT | Performed by: EMERGENCY MEDICINE

## 2022-10-18 PROCEDURE — 80053 COMPREHEN METABOLIC PANEL: CPT | Performed by: EMERGENCY MEDICINE

## 2022-10-18 PROCEDURE — 99291 CRITICAL CARE FIRST HOUR: CPT

## 2022-10-18 PROCEDURE — 84145 PROCALCITONIN (PCT): CPT | Performed by: EMERGENCY MEDICINE

## 2022-10-18 PROCEDURE — 72125 CT NECK SPINE W/O DYE: CPT

## 2022-10-18 PROCEDURE — C9803 HOPD COVID-19 SPEC COLLECT: HCPCS

## 2022-10-18 PROCEDURE — 83690 ASSAY OF LIPASE: CPT | Performed by: EMERGENCY MEDICINE

## 2022-10-18 PROCEDURE — 96375 TX/PRO/DX INJ NEW DRUG ADDON: CPT

## 2022-10-18 PROCEDURE — 36415 COLL VENOUS BLD VENIPUNCTURE: CPT

## 2022-10-18 PROCEDURE — 84443 ASSAY THYROID STIM HORMONE: CPT | Performed by: EMERGENCY MEDICINE

## 2022-10-18 PROCEDURE — 83050 HGB METHEMOGLOBIN QUAN: CPT

## 2022-10-18 PROCEDURE — 85018 HEMOGLOBIN: CPT

## 2022-10-18 PROCEDURE — 36600 WITHDRAWAL OF ARTERIAL BLOOD: CPT

## 2022-10-18 PROCEDURE — 10004180 HB COUNTER-TRANSPORT

## 2022-10-18 PROCEDURE — 83605 ASSAY OF LACTIC ACID: CPT | Performed by: EMERGENCY MEDICINE

## 2022-10-18 PROCEDURE — 93005 ELECTROCARDIOGRAM TRACING: CPT | Performed by: EMERGENCY MEDICINE

## 2022-10-18 PROCEDURE — 51702 INSERT TEMP BLADDER CATH: CPT

## 2022-10-18 PROCEDURE — 82140 ASSAY OF AMMONIA: CPT | Performed by: EMERGENCY MEDICINE

## 2022-10-18 PROCEDURE — 87040 BLOOD CULTURE FOR BACTERIA: CPT | Performed by: EMERGENCY MEDICINE

## 2022-10-18 RX ORDER — 0.9 % SODIUM CHLORIDE 0.9 %
2 VIAL (ML) INJECTION EVERY 12 HOURS SCHEDULED
Status: DISCONTINUED | OUTPATIENT
Start: 2022-10-19 | End: 2022-10-19 | Stop reason: HOSPADM

## 2022-10-18 RX ADMIN — PIPERACILLIN SODIUM AND TAZOBACTAM SODIUM 4.5 G: 4; .5 INJECTION, POWDER, FOR SOLUTION INTRAVENOUS at 23:12

## 2022-10-18 RX ADMIN — SODIUM CHLORIDE, SODIUM LACTATE, POTASSIUM CHLORIDE, AND CALCIUM CHLORIDE 500 ML: .6; .31; .03; .02 INJECTION, SOLUTION INTRAVENOUS at 22:34

## 2022-10-18 RX ADMIN — SODIUM CHLORIDE 1000 ML: 9 INJECTION, SOLUTION INTRAVENOUS at 21:54

## 2022-10-18 RX ADMIN — SODIUM CHLORIDE, SODIUM LACTATE, POTASSIUM CHLORIDE, AND CALCIUM CHLORIDE 250 ML: .6; .31; .03; .02 INJECTION, SOLUTION INTRAVENOUS at 22:34

## 2022-10-18 RX ADMIN — SODIUM CHLORIDE, SODIUM LACTATE, POTASSIUM CHLORIDE, AND CALCIUM CHLORIDE 1000 ML: .6; .31; .03; .02 INJECTION, SOLUTION INTRAVENOUS at 22:33

## 2022-10-19 VITALS
DIASTOLIC BLOOD PRESSURE: 71 MMHG | SYSTOLIC BLOOD PRESSURE: 103 MMHG | TEMPERATURE: 97.9 F | BODY MASS INDEX: 40.17 KG/M2 | WEIGHT: 255.95 LBS | RESPIRATION RATE: 18 BRPM | HEIGHT: 67 IN | OXYGEN SATURATION: 93 % | HEART RATE: 85 BPM

## 2022-10-19 PROBLEM — R41.82 ALTERED MENTAL STATUS, UNSPECIFIED ALTERED MENTAL STATUS TYPE: Status: ACTIVE | Noted: 2022-10-19

## 2022-10-19 LAB
AMMONIA PLAS-SCNC: <10 MCMOL/L
ATRIAL RATE (BPM): 71
P AXIS (DEGREES): 63
PR-INTERVAL (MSEC): 150
QRS-INTERVAL (MSEC): 94
QT-INTERVAL (MSEC): 428
QTC: 465
R AXIS (DEGREES): 79
RAINBOW EXTRA TUBES HOLD SPECIMEN: NORMAL
REPORT TEXT: NORMAL
T AXIS (DEGREES): 71
TSH SERPL-ACNC: 1.68 MCUNITS/ML (ref 0.35–5)
VENTRICULAR RATE EKG/MIN (BPM): 71

## 2022-10-19 PROCEDURE — 90471 IMMUNIZATION ADMIN: CPT | Performed by: INTERNAL MEDICINE

## 2022-10-19 PROCEDURE — 10002800 HB RX 250 W HCPCS: Performed by: EMERGENCY MEDICINE

## 2022-10-19 PROCEDURE — 96361 HYDRATE IV INFUSION ADD-ON: CPT

## 2022-10-19 PROCEDURE — G0378 HOSPITAL OBSERVATION PER HR: HCPCS

## 2022-10-19 PROCEDURE — 94640 AIRWAY INHALATION TREATMENT: CPT

## 2022-10-19 PROCEDURE — 90792 PSYCH DIAG EVAL W/MED SRVCS: CPT | Performed by: PSYCHIATRY & NEUROLOGY

## 2022-10-19 PROCEDURE — 94664 DEMO&/EVAL PT USE INHALER: CPT

## 2022-10-19 PROCEDURE — 90686 IIV4 VACC NO PRSV 0.5 ML IM: CPT | Performed by: INTERNAL MEDICINE

## 2022-10-19 PROCEDURE — 10002807 HB RX 258: Performed by: INTERNAL MEDICINE

## 2022-10-19 PROCEDURE — 10002801 HB RX 250 W/O HCPCS

## 2022-10-19 PROCEDURE — 10002807 HB RX 258: Performed by: EMERGENCY MEDICINE

## 2022-10-19 PROCEDURE — 10002019 HB COUNTER RESP ASSESSMENT

## 2022-10-19 PROCEDURE — 10004180 HB COUNTER-TRANSPORT

## 2022-10-19 PROCEDURE — 96376 TX/PRO/DX INJ SAME DRUG ADON: CPT

## 2022-10-19 PROCEDURE — 10002800 HB RX 250 W HCPCS: Performed by: INTERNAL MEDICINE

## 2022-10-19 PROCEDURE — 10004651 HB RX, NO CHARGE ITEM: Performed by: EMERGENCY MEDICINE

## 2022-10-19 RX ORDER — ENOXAPARIN SODIUM 100 MG/ML
40 INJECTION SUBCUTANEOUS EVERY 24 HOURS
Status: DISCONTINUED | OUTPATIENT
Start: 2022-10-19 | End: 2022-10-19 | Stop reason: HOSPADM

## 2022-10-19 RX ORDER — CETIRIZINE HYDROCHLORIDE 10 MG/1
10 TABLET ORAL DAILY PRN
Status: DISCONTINUED | OUTPATIENT
Start: 2022-10-19 | End: 2022-10-19 | Stop reason: HOSPADM

## 2022-10-19 RX ORDER — ALBUTEROL SULFATE 2.5 MG/3ML
SOLUTION RESPIRATORY (INHALATION)
Status: COMPLETED
Start: 2022-10-19 | End: 2022-10-19

## 2022-10-19 RX ORDER — DOXEPIN HYDROCHLORIDE 50 MG/1
50 CAPSULE ORAL NIGHTLY
Status: DISCONTINUED | OUTPATIENT
Start: 2022-10-19 | End: 2022-10-19 | Stop reason: HOSPADM

## 2022-10-19 RX ORDER — ALBUTEROL SULFATE 2.5 MG/3ML
2.5 SOLUTION RESPIRATORY (INHALATION)
Status: DISCONTINUED | OUTPATIENT
Start: 2022-10-19 | End: 2022-10-19 | Stop reason: HOSPADM

## 2022-10-19 RX ORDER — FLUTICASONE PROPIONATE 50 MCG
1 SPRAY, SUSPENSION (ML) NASAL DAILY
Status: DISCONTINUED | OUTPATIENT
Start: 2022-10-19 | End: 2022-10-19 | Stop reason: HOSPADM

## 2022-10-19 RX ORDER — ALBUTEROL SULFATE 90 UG/1
2 AEROSOL, METERED RESPIRATORY (INHALATION) EVERY 6 HOURS
Status: DISCONTINUED | OUTPATIENT
Start: 2022-10-19 | End: 2022-10-19

## 2022-10-19 RX ORDER — TOPIRAMATE 25 MG/1
100 TABLET ORAL 2 TIMES DAILY
Status: DISCONTINUED | OUTPATIENT
Start: 2022-10-19 | End: 2022-10-19 | Stop reason: HOSPADM

## 2022-10-19 RX ORDER — CLONAZEPAM 0.5 MG/1
0.5 TABLET ORAL 2 TIMES DAILY PRN
Status: DISCONTINUED | OUTPATIENT
Start: 2022-10-19 | End: 2022-10-19 | Stop reason: HOSPADM

## 2022-10-19 RX ORDER — AZITHROMYCIN 250 MG/1
250 TABLET, FILM COATED ORAL DAILY
Qty: 5 TABLET | Refills: 0 | Status: SHIPPED | OUTPATIENT
Start: 2022-10-19 | End: 2022-10-28 | Stop reason: ALTCHOICE

## 2022-10-19 RX ORDER — SODIUM CHLORIDE 9 MG/ML
INJECTION, SOLUTION INTRAVENOUS CONTINUOUS
Status: DISCONTINUED | OUTPATIENT
Start: 2022-10-19 | End: 2022-10-19 | Stop reason: HOSPADM

## 2022-10-19 RX ADMIN — SODIUM CHLORIDE: 9 INJECTION, SOLUTION INTRAVENOUS at 03:53

## 2022-10-19 RX ADMIN — ALBUTEROL SULFATE 2.5 MG: 2.5 SOLUTION RESPIRATORY (INHALATION) at 16:02

## 2022-10-19 RX ADMIN — PIPERACILLIN AND TAZOBACTAM 3.38 G: 3; .375 INJECTION, POWDER, FOR SOLUTION INTRAVENOUS at 13:59

## 2022-10-19 RX ADMIN — AZITHROMYCIN 500 MG: 500 INJECTION, POWDER, LYOPHILIZED, FOR SOLUTION INTRAVENOUS at 10:50

## 2022-10-19 RX ADMIN — PIPERACILLIN AND TAZOBACTAM 3.38 G: 3; .375 INJECTION, POWDER, FOR SOLUTION INTRAVENOUS at 05:58

## 2022-10-19 RX ADMIN — SODIUM CHLORIDE, PRESERVATIVE FREE 2 ML: 5 INJECTION INTRAVENOUS at 09:25

## 2022-10-19 RX ADMIN — INFLUENZA VIRUS VACCINE 0.5 ML: 15; 15; 15; 15 SUSPENSION INTRAMUSCULAR at 13:00

## 2022-10-19 RX ADMIN — AZITHROMYCIN 500 MG: 500 INJECTION, POWDER, LYOPHILIZED, FOR SOLUTION INTRAVENOUS at 00:08

## 2022-10-19 ASSESSMENT — COGNITIVE AND FUNCTIONAL STATUS - GENERAL
BEHAVIOR: APPROPRIATE TO SITUATION
BECAUSE OF A PHYSICAL, MENTAL, OR EMOTIONAL CONDITION, DO YOU HAVE SERIOUS DIFFICULTY CONCENTRATING, REMEMBERING OR MAKING DECISIONS: YES
AFFECT: FLAT
SPEECH: CLEAR/UNDERSTANDABLE
MOOD: FLAT
PERCEPTUAL_MISINTERPRETATIONS_HALLUCINATIONS: CLEAR REALITY BASED PERCEPTIONS
DO YOU HAVE DIFFICULTY DRESSING OR BATHING: NO
MEMORY: INTACT
LEVEL_OF_CONSCIOUSNESS_CALCULATED: ALERT
ATTENTION_CALCULATED: MAINTAINS ATTENTION
MOTOR_BEHAVIOR-RETARDATION_CALCULATED: CALM AND PURPOSEFUL
ORIENTATION: ORIENTED (PERSON/PLACE/TIME)
MOTOR_BEHAVIOR-AGITATION_CALCULATED: CALM AND PURPOSEFUL
DO YOU HAVE SERIOUS DIFFICULTY WALKING OR CLIMBING STAIRS: YES
BECAUSE OF A PHYSICAL, MENTAL, OR EMOTIONAL CONDITION, DO YOU HAVE DIFFICULTY DOING ERRANDS ALONE: YES

## 2022-10-19 ASSESSMENT — COLUMBIA-SUICIDE SEVERITY RATING SCALE - C-SSRS
2. HAVE YOU ACTUALLY HAD ANY THOUGHTS OF KILLING YOURSELF?: NO
HOW LONG AGO DID YOU DO ANY OF THESE?: OVER A YEAR AGO
1. WITHIN THE PAST MONTH, HAVE YOU WISHED YOU WERE DEAD OR WISHED YOU COULD GO TO SLEEP AND NOT WAKE UP?: YES
3. HAVE YOU BEEN THINKING ABOUT HOW YOU MIGHT KILL YOURSELF?: NO
4. HAVE YOU HAD THESE THOUGHTS AND HAD SOME INTENTION OF ACTING ON THEM?: NO
6. HAVE YOU EVER DONE ANYTHING, STARTED TO DO ANYTHING, OR PREPARED TO DO ANYTHING TO END YOUR LIFE?: YES
5. HAVE YOU STARTED TO WORK OUT OR WORKED OUT THE DETAILS OF HOW TO KILL YOURSELF? DO YOU INTEND TO CARRY OUT THIS PLAN?: NO
IS THE PATIENT ABLE TO COMPLETE C-SSRS: YES

## 2022-10-19 ASSESSMENT — LIFESTYLE VARIABLES
HOW MANY STANDARD DRINKS CONTAINING ALCOHOL DO YOU HAVE ON A TYPICAL DAY: 0,1 OR 2
SMOKING_YEARS: NO
HISTORY OF PROBLEMS WHEN YOU STOP DRINKING ALCOHOL: SEIZURE
RECENT DECLINE IN ADLS: YES, DECLINE IN BATHING/DRESSING/FEEDING, COLLABORATE WITH PROVIDER (T)
ALCOHOL_USE_STATUS: NO OR LOW RISK WITH VALIDATED TOOL
ADL BEFORE ADMISSION: INDEPENDENT
HOW OFTEN DO YOU HAVE 6 OR MORE DRINKS ON ONE OCCASION: NEVER
ADL NEEDS ASSIST: NO
AUDIT-C TOTAL SCORE: 1
ARE YOU DEAF OR DO YOU HAVE SERIOUS DIFFICULTY  HEARING: NO
LAST DRINK YOU HAD: DENIES INTAKE
IS PATIENT ABLE TO COMPLETE ASSESSMENT AT THIS TIME: NO (T)
DESCRIBE HOW PAIN IMPACTS ON YOUR LIFE: MOBILITY;PHYSICAL ACTIVITY;RELAXATION/REST/SLEEP;ENERGY LEVEL
MOBILITY ASSIST DEVICES: STANDARD WALKER;CANE
HOW OFTEN DO YOU HAVE A DRINK CONTAINING ALCOHOL: MONTHLY OR LESS
SHORT OF BREATH OR FATIGUE WITH ADLS: YES
ARE YOU BLIND OR DO YOU HAVE SERIOUS DIFFICULTY SEEING, EVEN WHEN WEARING GLASSES: YES
CHRONIC/CANCER PAIN PRESENT: YES, CHRONIC

## 2022-10-19 ASSESSMENT — PATIENT HEALTH QUESTIONNAIRE - PHQ9
8. MOVING OR SPEAKING SO SLOWLY THAT OTHER PEOPLE COULD HAVE NOTICED. OR THE OPPOSITE, BEING SO FIGETY OR RESTLESS THAT YOU HAVE BEEN MOVING AROUND A LOT MORE THAN USUAL: NEARLY EVERY DAY
7. TROUBLE CONCENTRATING ON THINGS, SUCH AS READING THE NEWSPAPER OR WATCHING TELEVISION: SEVERAL DAYS
SUM OF ALL RESPONSES TO PHQ QUESTIONS 1-9: 19
1. LITTLE INTEREST OR PLEASURE IN DOING THINGS: SEVERAL DAYS
SUM OF ALL RESPONSES TO PHQ9 QUESTIONS 1 AND 2: 4
5. POOR APPETITE OR OVEREATING: MORE THAN HALF THE DAYS
IS PATIENT ABLE TO COMPLETE PHQ2 OR PHQ9: YES
10. IF YOU CHECKED OFF ANY PROBLEMS, HOW DIFFICULT HAVE THESE PROBLEMS MADE IT FOR YOU TO DO YOUR WORK, TAKE CARE OF THINGS AT HOME, OR GET ALONG WITH OTHER PEOPLE: SOMEWHAT DIFFICULT
CLINICAL INTERPRETATION OF PHQ2 SCORE: FURTHER SCREENING NEEDED
4. FEELING TIRED OR HAVING LITTLE ENERGY: MORE THAN HALF THE DAYS
3. TROUBLE FALLING OR STAYING ASLEEP OR SLEEPING TOO MUCH: MORE THAN HALF THE DAYS
CLINICAL INTERPRETATION OF PHQ9 SCORE: MODERATELY SEVERE DEPRESSION
2. FEELING DOWN, DEPRESSED OR HOPELESS: NEARLY EVERY DAY
6. FEELING BAD ABOUT YOURSELF - OR THAT YOU ARE A FAILURE OR HAVE LET YOURSELF OR YOUR FAMILY DOWN: MORE THAN HALF THE DAYS
SUM OF ALL RESPONSES TO PHQ9 QUESTIONS 1 AND 2: 4
9. THOUGHTS THAT YOU WOULD BE BETTER OFF DEAD, OR OF HURTING YOURSELF: NEARLY EVERY DAY

## 2022-10-19 ASSESSMENT — PULMONARY FUNCTION TESTS
FEV1/FVC: UNABLE TO OBTAIN, OR GREATER THAN 70%
FEV1: 0
FEV1: 0
FEV1_PREDICTED: 0
FEV1: 0

## 2022-10-19 ASSESSMENT — ENCOUNTER SYMPTOMS
ENDOCRINE NEGATIVE: 1
LIGHT-HEADEDNESS: 1
SEIZURES: 1
COUGH: 1
FATIGUE: 1
ACTIVITY CHANGE: 0
EYES NEGATIVE: 1
STRIDOR: 1
GASTROINTESTINAL NEGATIVE: 1
HEADACHES: 1

## 2022-10-19 ASSESSMENT — ACTIVITIES OF DAILY LIVING (ADL)
ADL_SCORE: 12
SENSORY_SUPPORT_DEVICES: EYEGLASSES

## 2022-10-19 ASSESSMENT — PAIN SCALES - GENERAL
PAINLEVEL_OUTOF10: 3
PAINLEVEL_OUTOF10: 0

## 2022-10-20 ENCOUNTER — TELEPHONE (OUTPATIENT)
Dept: BEHAVIORAL HEALTH | Age: 37
End: 2022-10-20

## 2022-10-24 ENCOUNTER — HOSPITAL ENCOUNTER (OUTPATIENT)
Dept: BEHAVIORAL HEALTH | Age: 37
Discharge: STILL ADMITTED OR ADMINISTRATIVE | End: 2022-10-24
Attending: PSYCHIATRY & NEUROLOGY

## 2022-10-24 LAB
BACTERIA BLD CULT: NORMAL
BACTERIA BLD CULT: NORMAL

## 2022-10-25 LAB — CORTIS F SERPL-MCNC: 0.11 UG/DL

## 2022-10-26 ENCOUNTER — BEHAVIORAL HEALTH (OUTPATIENT)
Dept: BEHAVIORAL HEALTH | Age: 37
End: 2022-10-26

## 2022-10-26 ENCOUNTER — TELEPHONE (OUTPATIENT)
Dept: BEHAVIORAL HEALTH | Age: 37
End: 2022-10-26

## 2022-10-26 DIAGNOSIS — F51.01 PRIMARY INSOMNIA: ICD-10-CM

## 2022-10-26 DIAGNOSIS — F43.10 PTSD (POST-TRAUMATIC STRESS DISORDER): ICD-10-CM

## 2022-10-26 DIAGNOSIS — F41.9 ANXIETY: ICD-10-CM

## 2022-10-26 DIAGNOSIS — F31.32 BIPOLAR AFFECTIVE DISORDER, CURRENTLY DEPRESSED, MODERATE (CMD): Primary | ICD-10-CM

## 2022-10-26 PROCEDURE — 99214 OFFICE O/P EST MOD 30 MIN: CPT | Performed by: PSYCHIATRY & NEUROLOGY

## 2022-10-26 RX ORDER — CLONAZEPAM 0.5 MG/1
0.5 TABLET ORAL 2 TIMES DAILY PRN
Qty: 60 TABLET | Refills: 0 | Status: SHIPPED | OUTPATIENT
Start: 2022-10-26 | End: 2022-12-06

## 2022-10-26 RX ORDER — DOXEPIN HYDROCHLORIDE 50 MG/1
50 CAPSULE ORAL NIGHTLY
Qty: 30 CAPSULE | Refills: 0 | Status: SHIPPED | OUTPATIENT
Start: 2022-10-26 | End: 2022-11-29

## 2022-10-26 RX ORDER — PRAZOSIN HYDROCHLORIDE 2 MG/1
6 CAPSULE ORAL NIGHTLY
Qty: 90 CAPSULE | Refills: 0 | Status: SHIPPED | OUTPATIENT
Start: 2022-10-26 | End: 2022-11-30

## 2022-10-26 RX ORDER — LAMOTRIGINE 100 MG/1
100 TABLET ORAL DAILY
Qty: 30 TABLET | Refills: 0 | Status: SHIPPED | OUTPATIENT
Start: 2022-10-26 | End: 2022-11-18 | Stop reason: DRUGHIGH

## 2022-10-27 ENCOUNTER — TELEPHONE (OUTPATIENT)
Dept: BEHAVIORAL HEALTH | Age: 37
End: 2022-10-27

## 2022-10-31 ENCOUNTER — HOSPITAL ENCOUNTER (OUTPATIENT)
Dept: BEHAVIORAL HEALTH | Age: 37
Discharge: STILL ADMITTED OR ADMINISTRATIVE | End: 2022-10-31
Attending: PSYCHIATRY & NEUROLOGY

## 2022-10-31 PROCEDURE — G0410 GRP PSYCH PARTIAL HOSP 45-50: HCPCS

## 2022-10-31 ASSESSMENT — ANXIETY QUESTIONNAIRES
5. BEING SO RESTLESS THAT IT IS HARD TO SIT STILL: 2 - MORE THAN HALF THE DAYS
7. FEELING AFRAID AS IF SOMETHING AWFUL MIGHT HAPPEN: 3 - NEARLY EVERY DAY
4. TROUBLE RELAXING: 3 - NEARLY EVERY DAY
6. BECOMING EASILY ANNOYED OR IRRITABLE: 0 - NOT AT ALL
1. FEELING NERVOUS, ANXIOUS, OR ON EDGE: 3 - NEARLY EVERY DAY
2. NOT BEING ABLE TO STOP OR CONTROL WORRYING: 3 - NEARLY EVERY DAY
GAD7 TOTAL SCORE: 17
3. WORRYING TOO MUCH ABOUT DIFFERENT THINGS: 3 - NEARLY EVERY DAY

## 2022-10-31 ASSESSMENT — PATIENT HEALTH QUESTIONNAIRE - PHQ9
8. MOVING OR SPEAKING SO SLOWLY THAT OTHER PEOPLE COULD HAVE NOTICED. OR THE OPPOSITE, BEING SO FIGETY OR RESTLESS THAT YOU HAVE BEEN MOVING AROUND A LOT MORE THAN USUAL: NEARLY EVERY DAY
IS PATIENT ABLE TO COMPLETE PHQ2 OR PHQ9: YES
1. LITTLE INTEREST OR PLEASURE IN DOING THINGS: NEARLY EVERY DAY
4. FEELING TIRED OR HAVING LITTLE ENERGY: NEARLY EVERY DAY
SUM OF ALL RESPONSES TO PHQ9 QUESTIONS 1 AND 2: 6
9. THOUGHTS THAT YOU WOULD BE BETTER OFF DEAD, OR OF HURTING YOURSELF: NOT AT ALL
CLINICAL INTERPRETATION OF PHQ2 SCORE: FURTHER SCREENING NEEDED
2. FEELING DOWN, DEPRESSED OR HOPELESS: NEARLY EVERY DAY
5. POOR APPETITE OR OVEREATING: MORE THAN HALF THE DAYS
6. FEELING BAD ABOUT YOURSELF - OR THAT YOU ARE A FAILURE OR HAVE LET YOURSELF OR YOUR FAMILY DOWN: MORE THAN HALF THE DAYS
SUM OF ALL RESPONSES TO PHQ9 QUESTIONS 1 AND 2: 6
7. TROUBLE CONCENTRATING ON THINGS, SUCH AS READING THE NEWSPAPER OR WATCHING TELEVISION: MORE THAN HALF THE DAYS

## 2022-11-01 ENCOUNTER — HOSPITAL ENCOUNTER (OUTPATIENT)
Dept: BEHAVIORAL HEALTH | Age: 37
Discharge: STILL ADMITTED OR ADMINISTRATIVE | End: 2022-11-01
Attending: PSYCHIATRY & NEUROLOGY

## 2022-11-01 PROCEDURE — G0410 GRP PSYCH PARTIAL HOSP 45-50: HCPCS

## 2022-11-02 ENCOUNTER — HOSPITAL ENCOUNTER (OUTPATIENT)
Dept: BEHAVIORAL HEALTH | Age: 37
Discharge: STILL ADMITTED OR ADMINISTRATIVE | End: 2022-11-02
Attending: PSYCHIATRY & NEUROLOGY

## 2022-11-02 PROCEDURE — G0410 GRP PSYCH PARTIAL HOSP 45-50: HCPCS

## 2022-11-03 ENCOUNTER — HOSPITAL ENCOUNTER (OUTPATIENT)
Dept: BEHAVIORAL HEALTH | Age: 37
Discharge: STILL ADMITTED OR ADMINISTRATIVE | End: 2022-11-03
Attending: PSYCHIATRY & NEUROLOGY

## 2022-11-03 ENCOUNTER — TELEPHONE (OUTPATIENT)
Dept: NEUROLOGY | Age: 37
End: 2022-11-03

## 2022-11-03 ENCOUNTER — TELEPHONE (OUTPATIENT)
Dept: BEHAVIORAL HEALTH | Age: 37
End: 2022-11-03

## 2022-11-04 ENCOUNTER — HOSPITAL ENCOUNTER (OUTPATIENT)
Dept: BEHAVIORAL HEALTH | Age: 37
Discharge: STILL ADMITTED OR ADMINISTRATIVE | End: 2022-11-04
Attending: PSYCHIATRY & NEUROLOGY

## 2022-11-04 PROCEDURE — 90792 PSYCH DIAG EVAL W/MED SRVCS: CPT | Performed by: PSYCHIATRY & NEUROLOGY

## 2022-11-04 PROCEDURE — G0410 GRP PSYCH PARTIAL HOSP 45-50: HCPCS

## 2022-11-07 ENCOUNTER — HOSPITAL ENCOUNTER (OUTPATIENT)
Dept: BEHAVIORAL HEALTH | Age: 37
Discharge: STILL ADMITTED OR ADMINISTRATIVE | End: 2022-11-07
Attending: PSYCHIATRY & NEUROLOGY

## 2022-11-07 PROCEDURE — G0410 GRP PSYCH PARTIAL HOSP 45-50: HCPCS

## 2022-11-08 ENCOUNTER — OFFICE VISIT (OUTPATIENT)
Dept: NEUROLOGY | Age: 37
End: 2022-11-08

## 2022-11-08 ENCOUNTER — APPOINTMENT (OUTPATIENT)
Dept: BEHAVIORAL HEALTH | Age: 37
End: 2022-11-08
Attending: PSYCHIATRY & NEUROLOGY

## 2022-11-08 ENCOUNTER — E-ADVICE (OUTPATIENT)
Dept: NEUROLOGY | Age: 37
End: 2022-11-08

## 2022-11-08 VITALS
OXYGEN SATURATION: 97 % | WEIGHT: 246.47 LBS | HEART RATE: 74 BPM | BODY MASS INDEX: 41.07 KG/M2 | DIASTOLIC BLOOD PRESSURE: 70 MMHG | SYSTOLIC BLOOD PRESSURE: 99 MMHG | HEIGHT: 65 IN

## 2022-11-08 DIAGNOSIS — F44.5 PSYCHOGENIC NONEPILEPTIC SEIZURE: ICD-10-CM

## 2022-11-08 DIAGNOSIS — G43.101 MIGRAINE WITH AURA AND WITH STATUS MIGRAINOSUS, NOT INTRACTABLE: Primary | ICD-10-CM

## 2022-11-08 PROCEDURE — 99215 OFFICE O/P EST HI 40 MIN: CPT | Performed by: PSYCHIATRY & NEUROLOGY

## 2022-11-08 RX ORDER — MIRTAZAPINE 15 MG/1
TABLET, FILM COATED ORAL
COMMUNITY
End: 2022-11-08 | Stop reason: ALTCHOICE

## 2022-11-08 RX ORDER — ONDANSETRON 4 MG/1
4 TABLET, ORALLY DISINTEGRATING ORAL EVERY 8 HOURS PRN
Qty: 30 TABLET | Refills: 5 | Status: SHIPPED | OUTPATIENT
Start: 2022-11-08

## 2022-11-08 RX ORDER — NICOTINE 21 MG/24HR
PATCH, TRANSDERMAL 24 HOURS TRANSDERMAL
COMMUNITY
End: 2022-11-08 | Stop reason: ALTCHOICE

## 2022-11-09 ENCOUNTER — TELEPHONE (OUTPATIENT)
Dept: BEHAVIORAL HEALTH | Age: 37
End: 2022-11-09

## 2022-11-10 ENCOUNTER — TELEPHONE (OUTPATIENT)
Dept: NEUROLOGY | Age: 37
End: 2022-11-10

## 2022-11-11 ENCOUNTER — APPOINTMENT (OUTPATIENT)
Dept: BEHAVIORAL HEALTH | Age: 37
End: 2022-11-11

## 2022-11-11 ENCOUNTER — TELEPHONE (OUTPATIENT)
Dept: NEUROLOGY | Age: 37
End: 2022-11-11

## 2022-11-11 ENCOUNTER — APPOINTMENT (OUTPATIENT)
Dept: BEHAVIORAL HEALTH | Age: 37
End: 2022-11-11
Attending: PSYCHIATRY & NEUROLOGY

## 2022-11-15 ENCOUNTER — TELEPHONE (OUTPATIENT)
Dept: NEUROLOGY | Age: 37
End: 2022-11-15

## 2022-11-23 DIAGNOSIS — F31.32 BIPOLAR AFFECTIVE DISORDER, CURRENTLY DEPRESSED, MODERATE (CMD): ICD-10-CM

## 2022-11-23 RX ORDER — LAMOTRIGINE 100 MG/1
100 TABLET ORAL NIGHTLY
Qty: 30 TABLET | Refills: 0 | Status: SHIPPED | OUTPATIENT
Start: 2022-11-23 | End: 2022-12-09 | Stop reason: SDUPTHER

## 2022-11-23 RX ORDER — LAMOTRIGINE 25 MG/1
50 TABLET ORAL EVERY MORNING
Qty: 60 TABLET | Refills: 0 | Status: SHIPPED | OUTPATIENT
Start: 2022-11-23 | End: 2022-12-09 | Stop reason: DRUGHIGH

## 2022-11-25 ENCOUNTER — TELEPHONE (OUTPATIENT)
Dept: NEUROLOGY | Age: 37
End: 2022-11-25

## 2022-11-29 DIAGNOSIS — F51.01 PRIMARY INSOMNIA: ICD-10-CM

## 2022-11-29 DIAGNOSIS — F43.10 PTSD (POST-TRAUMATIC STRESS DISORDER): ICD-10-CM

## 2022-11-29 RX ORDER — DOXEPIN HYDROCHLORIDE 50 MG/1
50 CAPSULE ORAL NIGHTLY
Qty: 10 CAPSULE | Refills: 0 | Status: SHIPPED | OUTPATIENT
Start: 2022-11-29 | End: 2023-01-05

## 2022-11-30 RX ORDER — PRAZOSIN HYDROCHLORIDE 2 MG/1
6 CAPSULE ORAL NIGHTLY
Qty: 30 CAPSULE | Refills: 0 | Status: SHIPPED | OUTPATIENT
Start: 2022-11-30 | End: 2022-12-09 | Stop reason: SDUPTHER

## 2022-12-05 DIAGNOSIS — F41.9 ANXIETY: ICD-10-CM

## 2022-12-06 DIAGNOSIS — F41.9 ANXIETY: ICD-10-CM

## 2022-12-06 RX ORDER — CLONAZEPAM 0.5 MG/1
0.5 TABLET ORAL DAILY PRN
Qty: 60 TABLET | Refills: 0 | Status: SHIPPED | OUTPATIENT
Start: 2022-12-06 | End: 2022-12-06 | Stop reason: SDUPTHER

## 2022-12-06 RX ORDER — CLONAZEPAM 0.5 MG/1
0.5 TABLET ORAL DAILY PRN
Qty: 30 TABLET | Refills: 0 | Status: SHIPPED | OUTPATIENT
Start: 2022-12-06 | End: 2022-12-06 | Stop reason: SDUPTHER

## 2022-12-06 RX ORDER — CLONAZEPAM 0.5 MG/1
0.5 TABLET ORAL DAILY PRN
Qty: 30 TABLET | Refills: 0 | Status: SHIPPED | OUTPATIENT
Start: 2022-12-06 | End: 2022-12-07 | Stop reason: SDUPTHER

## 2022-12-07 ENCOUNTER — TELEPHONE (OUTPATIENT)
Dept: BEHAVIORAL HEALTH | Age: 37
End: 2022-12-07

## 2022-12-07 DIAGNOSIS — F41.9 ANXIETY: ICD-10-CM

## 2022-12-07 RX ORDER — CLONAZEPAM 0.5 MG/1
0.5 TABLET ORAL 2 TIMES DAILY PRN
Qty: 30 TABLET | Refills: 0 | Status: SHIPPED | OUTPATIENT
Start: 2022-12-07 | End: 2023-01-03

## 2022-12-09 ENCOUNTER — BEHAVIORAL HEALTH (OUTPATIENT)
Dept: BEHAVIORAL HEALTH | Age: 37
End: 2022-12-09

## 2022-12-09 DIAGNOSIS — F43.10 PTSD (POST-TRAUMATIC STRESS DISORDER): ICD-10-CM

## 2022-12-09 DIAGNOSIS — F31.32 BIPOLAR AFFECTIVE DISORDER, CURRENTLY DEPRESSED, MODERATE (CMD): Primary | ICD-10-CM

## 2022-12-09 DIAGNOSIS — F41.9 ANXIETY: ICD-10-CM

## 2022-12-09 DIAGNOSIS — F51.01 PRIMARY INSOMNIA: ICD-10-CM

## 2022-12-09 PROCEDURE — 99214 OFFICE O/P EST MOD 30 MIN: CPT | Performed by: PSYCHIATRY & NEUROLOGY

## 2022-12-09 RX ORDER — TEMAZEPAM 15 MG/1
15 CAPSULE ORAL NIGHTLY PRN
Qty: 30 CAPSULE | Refills: 0 | Status: SHIPPED | OUTPATIENT
Start: 2022-12-09 | End: 2022-12-13 | Stop reason: SDUPTHER

## 2022-12-09 RX ORDER — LAMOTRIGINE 100 MG/1
100 TABLET ORAL 2 TIMES DAILY
Qty: 60 TABLET | Refills: 0 | Status: SHIPPED | OUTPATIENT
Start: 2022-12-09 | End: 2023-01-25 | Stop reason: SDUPTHER

## 2022-12-09 RX ORDER — PRAZOSIN HYDROCHLORIDE 2 MG/1
6 CAPSULE ORAL NIGHTLY
Qty: 90 CAPSULE | Refills: 0 | Status: SHIPPED | OUTPATIENT
Start: 2022-12-09 | End: 2023-01-06

## 2022-12-12 ENCOUNTER — TELEPHONE (OUTPATIENT)
Dept: BEHAVIORAL HEALTH | Age: 37
End: 2022-12-12

## 2022-12-12 ENCOUNTER — APPOINTMENT (OUTPATIENT)
Dept: BEHAVIORAL HEALTH | Age: 37
End: 2022-12-12

## 2022-12-13 ENCOUNTER — E-ADVICE (OUTPATIENT)
Dept: BEHAVIORAL HEALTH | Age: 37
End: 2022-12-13

## 2022-12-13 DIAGNOSIS — F51.01 PRIMARY INSOMNIA: ICD-10-CM

## 2022-12-13 RX ORDER — TEMAZEPAM 15 MG/1
15 CAPSULE ORAL NIGHTLY PRN
Qty: 30 CAPSULE | Refills: 0 | Status: SHIPPED | OUTPATIENT
Start: 2022-12-13 | End: 2023-01-09 | Stop reason: ALTCHOICE

## 2022-12-15 ENCOUNTER — TELEPHONE (OUTPATIENT)
Dept: NEUROLOGY | Age: 37
End: 2022-12-15

## 2022-12-19 ENCOUNTER — APPOINTMENT (OUTPATIENT)
Dept: NEUROLOGY | Age: 37
End: 2022-12-19
Attending: PSYCHIATRY & NEUROLOGY

## 2022-12-21 ENCOUNTER — E-ADVICE (OUTPATIENT)
Dept: NEUROLOGY | Age: 37
End: 2022-12-21

## 2022-12-30 ENCOUNTER — HOSPITAL ENCOUNTER (EMERGENCY)
Age: 37
Discharge: HOME | End: 2022-12-31
Attending: EMERGENCY MEDICINE

## 2022-12-30 ENCOUNTER — APPOINTMENT (OUTPATIENT)
Dept: CT IMAGING | Age: 37
End: 2022-12-30
Attending: EMERGENCY MEDICINE

## 2022-12-30 VITALS
DIASTOLIC BLOOD PRESSURE: 97 MMHG | WEIGHT: 240 LBS | SYSTOLIC BLOOD PRESSURE: 143 MMHG | HEART RATE: 53 BPM | RESPIRATION RATE: 18 BRPM | BODY MASS INDEX: 39.99 KG/M2 | OXYGEN SATURATION: 95 % | HEIGHT: 65 IN | TEMPERATURE: 98.6 F

## 2022-12-30 DIAGNOSIS — G43.809 OTHER MIGRAINE WITHOUT STATUS MIGRAINOSUS, NOT INTRACTABLE: Primary | ICD-10-CM

## 2022-12-30 DIAGNOSIS — R55 SYNCOPE, UNSPECIFIED SYNCOPE TYPE: ICD-10-CM

## 2022-12-30 DIAGNOSIS — S06.0XAA CONCUSSION WITH UNKNOWN LOSS OF CONSCIOUSNESS STATUS, INITIAL ENCOUNTER: ICD-10-CM

## 2022-12-30 LAB
ALBUMIN SERPL-MCNC: 3.7 G/DL (ref 3.6–5.1)
ALBUMIN/GLOB SERPL: 1 {RATIO} (ref 1–2.4)
ALP SERPL-CCNC: 52 UNITS/L (ref 45–117)
ALT SERPL-CCNC: 21 UNITS/L
ANION GAP SERPL CALC-SCNC: 10 MMOL/L (ref 7–19)
AST SERPL-CCNC: 13 UNITS/L
BASOPHILS # BLD: 0.1 K/MCL (ref 0–0.3)
BASOPHILS NFR BLD: 1 %
BILIRUB SERPL-MCNC: 0.4 MG/DL (ref 0.2–1)
BUN SERPL-MCNC: 6 MG/DL (ref 6–20)
BUN/CREAT SERPL: 8 (ref 7–25)
CALCIUM SERPL-MCNC: 9.1 MG/DL (ref 8.4–10.2)
CHLORIDE SERPL-SCNC: 107 MMOL/L (ref 97–110)
CO2 SERPL-SCNC: 25 MMOL/L (ref 21–32)
CREAT SERPL-MCNC: 0.8 MG/DL (ref 0.51–0.95)
DEPRECATED RDW RBC: 37.1 FL (ref 39–50)
EOSINOPHIL # BLD: 0.2 K/MCL (ref 0–0.5)
EOSINOPHIL NFR BLD: 4 %
ERYTHROCYTE [DISTWIDTH] IN BLOOD: 17.6 % (ref 11–15)
FASTING DURATION TIME PATIENT: NORMAL H
GFR SERPLBLD BASED ON 1.73 SQ M-ARVRAT: >90 ML/MIN
GLOBULIN SER-MCNC: 3.7 G/DL (ref 2–4)
GLUCOSE SERPL-MCNC: 94 MG/DL (ref 70–99)
HCG SERPL QL: NEGATIVE
HCT VFR BLD CALC: 36.4 % (ref 36–46.5)
HGB BLD-MCNC: 11 G/DL (ref 12–15.5)
IMM GRANULOCYTES # BLD AUTO: 0 K/MCL (ref 0–0.2)
IMM GRANULOCYTES # BLD: 0 %
LACTATE BLDV-SCNC: 0.8 MMOL/L (ref 0–2)
LYMPHOCYTES # BLD: 1.7 K/MCL (ref 1–4.8)
LYMPHOCYTES NFR BLD: 27 %
MCH RBC QN AUTO: 19.2 PG (ref 26–34)
MCHC RBC AUTO-ENTMCNC: 30.2 G/DL (ref 32–36.5)
MCV RBC AUTO: 63.5 FL (ref 78–100)
MONOCYTES # BLD: 0.3 K/MCL (ref 0.3–0.9)
MONOCYTES NFR BLD: 4 %
NEUTROPHILS # BLD: 4.1 K/MCL (ref 1.8–7.7)
NEUTROPHILS NFR BLD: 64 %
NRBC BLD MANUAL-RTO: 0 /100 WBC
PLATELET # BLD AUTO: 435 K/MCL (ref 140–450)
POTASSIUM SERPL-SCNC: 3.7 MMOL/L (ref 3.4–5.1)
PROT SERPL-MCNC: 7.4 G/DL (ref 6.4–8.2)
RBC # BLD: 5.73 MIL/MCL (ref 4–5.2)
SODIUM SERPL-SCNC: 138 MMOL/L (ref 135–145)
TROPONIN I SERPL DL<=0.01 NG/ML-MCNC: <4 NG/L
WBC # BLD: 6.3 K/MCL (ref 4.2–11)

## 2022-12-30 PROCEDURE — 70450 CT HEAD/BRAIN W/O DYE: CPT

## 2022-12-30 PROCEDURE — 10002800 HB RX 250 W HCPCS

## 2022-12-30 PROCEDURE — 84703 CHORIONIC GONADOTROPIN ASSAY: CPT | Performed by: EMERGENCY MEDICINE

## 2022-12-30 PROCEDURE — 96374 THER/PROPH/DIAG INJ IV PUSH: CPT

## 2022-12-30 PROCEDURE — 80053 COMPREHEN METABOLIC PANEL: CPT | Performed by: EMERGENCY MEDICINE

## 2022-12-30 PROCEDURE — 93005 ELECTROCARDIOGRAM TRACING: CPT | Performed by: EMERGENCY MEDICINE

## 2022-12-30 PROCEDURE — 85025 COMPLETE CBC W/AUTO DIFF WBC: CPT | Performed by: EMERGENCY MEDICINE

## 2022-12-30 PROCEDURE — 96361 HYDRATE IV INFUSION ADD-ON: CPT

## 2022-12-30 PROCEDURE — 72125 CT NECK SPINE W/O DYE: CPT

## 2022-12-30 PROCEDURE — 10002800 HB RX 250 W HCPCS: Performed by: EMERGENCY MEDICINE

## 2022-12-30 PROCEDURE — 99284 EMERGENCY DEPT VISIT MOD MDM: CPT

## 2022-12-30 PROCEDURE — 10002807 HB RX 258: Performed by: EMERGENCY MEDICINE

## 2022-12-30 PROCEDURE — 93010 ELECTROCARDIOGRAM REPORT: CPT | Performed by: SPECIALIST

## 2022-12-30 PROCEDURE — 83605 ASSAY OF LACTIC ACID: CPT | Performed by: EMERGENCY MEDICINE

## 2022-12-30 PROCEDURE — G1004 CDSM NDSC: HCPCS

## 2022-12-30 PROCEDURE — 96375 TX/PRO/DX INJ NEW DRUG ADDON: CPT

## 2022-12-30 PROCEDURE — 84484 ASSAY OF TROPONIN QUANT: CPT | Performed by: EMERGENCY MEDICINE

## 2022-12-30 RX ORDER — DEXAMETHASONE SODIUM PHOSPHATE 10 MG/ML
10 INJECTION, SOLUTION INTRAMUSCULAR; INTRAVENOUS ONCE
Status: COMPLETED | OUTPATIENT
Start: 2022-12-30 | End: 2022-12-30

## 2022-12-30 RX ORDER — ONDANSETRON 2 MG/ML
4 INJECTION INTRAMUSCULAR; INTRAVENOUS ONCE
Status: COMPLETED | OUTPATIENT
Start: 2022-12-30 | End: 2022-12-30

## 2022-12-30 RX ORDER — DIPHENHYDRAMINE HYDROCHLORIDE 50 MG/ML
50 INJECTION INTRAMUSCULAR; INTRAVENOUS ONCE
Status: COMPLETED | OUTPATIENT
Start: 2022-12-30 | End: 2022-12-30

## 2022-12-30 RX ORDER — BUTALBITAL, ACETAMINOPHEN AND CAFFEINE 300; 40; 50 MG/1; MG/1; MG/1
1-2 CAPSULE ORAL EVERY 6 HOURS PRN
Qty: 24 CAPSULE | Refills: 0 | Status: SHIPPED | OUTPATIENT
Start: 2022-12-30 | End: 2023-02-06

## 2022-12-30 RX ORDER — ONDANSETRON 2 MG/ML
INJECTION INTRAMUSCULAR; INTRAVENOUS
Status: COMPLETED
Start: 2022-12-30 | End: 2022-12-30

## 2022-12-30 RX ORDER — METHYLPREDNISOLONE 4 MG/1
4 TABLET ORAL SEE ADMIN INSTRUCTIONS
Qty: 21 TABLET | Refills: 0 | Status: SHIPPED | OUTPATIENT
Start: 2022-12-30 | End: 2023-01-05 | Stop reason: SINTOL

## 2022-12-30 RX ORDER — DROPERIDOL 2.5 MG/ML
2.5 INJECTION, SOLUTION INTRAMUSCULAR; INTRAVENOUS ONCE
Status: COMPLETED | OUTPATIENT
Start: 2022-12-30 | End: 2022-12-30

## 2022-12-30 RX ORDER — PROCHLORPERAZINE EDISYLATE 5 MG/ML
10 INJECTION INTRAMUSCULAR; INTRAVENOUS ONCE
Status: COMPLETED | OUTPATIENT
Start: 2022-12-30 | End: 2022-12-30

## 2022-12-30 RX ORDER — KETOROLAC TROMETHAMINE 15 MG/ML
15 INJECTION, SOLUTION INTRAMUSCULAR; INTRAVENOUS ONCE
Status: COMPLETED | OUTPATIENT
Start: 2022-12-30 | End: 2022-12-30

## 2022-12-30 RX ADMIN — PROCHLORPERAZINE EDISYLATE 10 MG: 5 INJECTION INTRAMUSCULAR; INTRAVENOUS at 20:54

## 2022-12-30 RX ADMIN — SODIUM CHLORIDE 1000 ML: 9 INJECTION, SOLUTION INTRAVENOUS at 23:10

## 2022-12-30 RX ADMIN — DEXAMETHASONE SODIUM PHOSPHATE 10 MG: 10 INJECTION INTRAMUSCULAR; INTRAVENOUS at 20:53

## 2022-12-30 RX ADMIN — ONDANSETRON 4 MG: 2 INJECTION INTRAMUSCULAR; INTRAVENOUS at 20:52

## 2022-12-30 RX ADMIN — DROPERIDOL 2.5 MG: 2.5 INJECTION, SOLUTION INTRAMUSCULAR; INTRAVENOUS at 23:06

## 2022-12-30 RX ADMIN — DIPHENHYDRAMINE HYDROCHLORIDE 50 MG: 50 INJECTION, SOLUTION INTRAMUSCULAR; INTRAVENOUS at 20:51

## 2022-12-30 RX ADMIN — KETOROLAC TROMETHAMINE 15 MG: 15 INJECTION, SOLUTION INTRAMUSCULAR; INTRAVENOUS at 23:04

## 2022-12-30 RX ADMIN — SODIUM CHLORIDE 1000 ML: 9 INJECTION, SOLUTION INTRAVENOUS at 20:58

## 2022-12-30 ASSESSMENT — ENCOUNTER SYMPTOMS
DIZZINESS: 0
SHORTNESS OF BREATH: 0
VOMITING: 0
LIGHT-HEADEDNESS: 0
FEVER: 0
BACK PAIN: 0
ADENOPATHY: 0
CONFUSION: 0
ABDOMINAL PAIN: 0
COUGH: 0
SORE THROAT: 0
HEADACHES: 1
DIARRHEA: 0
CHILLS: 0
NAUSEA: 0

## 2022-12-30 ASSESSMENT — PAIN SCALES - GENERAL
PAINLEVEL_OUTOF10: 9
PAINLEVEL_OUTOF10: 6

## 2022-12-31 LAB
ATRIAL RATE (BPM): 68
P AXIS (DEGREES): 49
PR-INTERVAL (MSEC): 148
QRS-INTERVAL (MSEC): 92
QT-INTERVAL (MSEC): 418
QTC: 444
R AXIS (DEGREES): 35
RAINBOW EXTRA TUBES HOLD SPECIMEN: NORMAL
REPORT TEXT: NORMAL
T AXIS (DEGREES): 54
VENTRICULAR RATE EKG/MIN (BPM): 68

## 2023-01-01 ENCOUNTER — APPOINTMENT (OUTPATIENT)
Dept: CT IMAGING | Age: 38
End: 2023-01-01
Attending: EMERGENCY MEDICINE

## 2023-01-01 ENCOUNTER — HOSPITAL ENCOUNTER (EMERGENCY)
Age: 38
Discharge: HOME | End: 2023-01-02
Attending: EMERGENCY MEDICINE

## 2023-01-01 DIAGNOSIS — G43.809 OTHER MIGRAINE WITHOUT STATUS MIGRAINOSUS, NOT INTRACTABLE: Primary | ICD-10-CM

## 2023-01-01 DIAGNOSIS — F41.9 ANXIETY: ICD-10-CM

## 2023-01-01 LAB
BASOPHILS # BLD: 0.1 K/MCL (ref 0–0.3)
BASOPHILS NFR BLD: 1 %
DEPRECATED RDW RBC: 36.2 FL (ref 39–50)
EOSINOPHIL # BLD: 0.2 K/MCL (ref 0–0.5)
EOSINOPHIL NFR BLD: 3 %
ERYTHROCYTE [DISTWIDTH] IN BLOOD: 16.8 % (ref 11–15)
HCT VFR BLD CALC: 32.8 % (ref 36–46.5)
HGB BLD-MCNC: 10.2 G/DL (ref 12–15.5)
IMM GRANULOCYTES # BLD AUTO: 0 K/MCL (ref 0–0.2)
IMM GRANULOCYTES # BLD: 0 %
LYMPHOCYTES # BLD: 2.9 K/MCL (ref 1–4.8)
LYMPHOCYTES NFR BLD: 43 %
MCH RBC QN AUTO: 19.5 PG (ref 26–34)
MCHC RBC AUTO-ENTMCNC: 31.1 G/DL (ref 32–36.5)
MCV RBC AUTO: 62.7 FL (ref 78–100)
MONOCYTES # BLD: 0.3 K/MCL (ref 0.3–0.9)
MONOCYTES NFR BLD: 4 %
NEUTROPHILS # BLD: 3.2 K/MCL (ref 1.8–7.7)
NEUTROPHILS NFR BLD: 49 %
NRBC BLD MANUAL-RTO: 0 /100 WBC
PLATELET # BLD AUTO: 386 K/MCL (ref 140–450)
RBC # BLD: 5.23 MIL/MCL (ref 4–5.2)
WBC # BLD: 6.7 K/MCL (ref 4.2–11)

## 2023-01-01 PROCEDURE — 96374 THER/PROPH/DIAG INJ IV PUSH: CPT

## 2023-01-01 PROCEDURE — 85025 COMPLETE CBC W/AUTO DIFF WBC: CPT | Performed by: EMERGENCY MEDICINE

## 2023-01-01 PROCEDURE — 70450 CT HEAD/BRAIN W/O DYE: CPT

## 2023-01-01 PROCEDURE — 10002807 HB RX 258: Performed by: EMERGENCY MEDICINE

## 2023-01-01 PROCEDURE — 80048 BASIC METABOLIC PNL TOTAL CA: CPT | Performed by: EMERGENCY MEDICINE

## 2023-01-01 PROCEDURE — 93005 ELECTROCARDIOGRAM TRACING: CPT | Performed by: EMERGENCY MEDICINE

## 2023-01-01 PROCEDURE — 84703 CHORIONIC GONADOTROPIN ASSAY: CPT | Performed by: EMERGENCY MEDICINE

## 2023-01-01 PROCEDURE — 99284 EMERGENCY DEPT VISIT MOD MDM: CPT

## 2023-01-01 PROCEDURE — 96375 TX/PRO/DX INJ NEW DRUG ADDON: CPT

## 2023-01-01 PROCEDURE — 96361 HYDRATE IV INFUSION ADD-ON: CPT

## 2023-01-01 PROCEDURE — 93010 ELECTROCARDIOGRAM REPORT: CPT | Performed by: SPECIALIST

## 2023-01-01 PROCEDURE — G1004 CDSM NDSC: HCPCS

## 2023-01-01 PROCEDURE — 10002800 HB RX 250 W HCPCS: Performed by: EMERGENCY MEDICINE

## 2023-01-01 RX ORDER — LORAZEPAM 2 MG/ML
INJECTION INTRAMUSCULAR
Status: DISCONTINUED
Start: 2023-01-01 | End: 2023-01-02 | Stop reason: HOSPADM

## 2023-01-01 RX ORDER — DROPERIDOL 2.5 MG/ML
2.5 INJECTION, SOLUTION INTRAMUSCULAR; INTRAVENOUS ONCE
Status: COMPLETED | OUTPATIENT
Start: 2023-01-01 | End: 2023-01-02

## 2023-01-01 RX ORDER — LORAZEPAM 2 MG/ML
2 INJECTION INTRAMUSCULAR ONCE
Status: COMPLETED | OUTPATIENT
Start: 2023-01-01 | End: 2023-01-01

## 2023-01-01 RX ORDER — DIPHENHYDRAMINE HYDROCHLORIDE 50 MG/ML
25 INJECTION INTRAMUSCULAR; INTRAVENOUS ONCE
Status: COMPLETED | OUTPATIENT
Start: 2023-01-01 | End: 2023-01-02

## 2023-01-01 RX ADMIN — LORAZEPAM 2 MG: 2 INJECTION INTRAMUSCULAR; INTRAVENOUS at 23:37

## 2023-01-01 RX ADMIN — SODIUM CHLORIDE 1000 ML: 9 INJECTION, SOLUTION INTRAVENOUS at 23:36

## 2023-01-02 VITALS
HEART RATE: 54 BPM | BODY MASS INDEX: 43.09 KG/M2 | RESPIRATION RATE: 16 BRPM | OXYGEN SATURATION: 97 % | DIASTOLIC BLOOD PRESSURE: 75 MMHG | SYSTOLIC BLOOD PRESSURE: 112 MMHG | HEIGHT: 65 IN | WEIGHT: 258.6 LBS | TEMPERATURE: 98 F

## 2023-01-02 LAB
ANION GAP SERPL CALC-SCNC: 8 MMOL/L (ref 7–19)
ATRIAL RATE (BPM): 52
BUN SERPL-MCNC: 6 MG/DL (ref 6–20)
BUN/CREAT SERPL: 8 (ref 7–25)
CALCIUM SERPL-MCNC: 8.5 MG/DL (ref 8.4–10.2)
CHLORIDE SERPL-SCNC: 108 MMOL/L (ref 97–110)
CO2 SERPL-SCNC: 25 MMOL/L (ref 21–32)
CREAT SERPL-MCNC: 0.74 MG/DL (ref 0.51–0.95)
FASTING DURATION TIME PATIENT: NORMAL H
GFR SERPLBLD BASED ON 1.73 SQ M-ARVRAT: >90 ML/MIN
GLUCOSE SERPL-MCNC: 93 MG/DL (ref 70–99)
HCG SERPL QL: NEGATIVE
P AXIS (DEGREES): 41
POTASSIUM SERPL-SCNC: 3.6 MMOL/L (ref 3.4–5.1)
PR-INTERVAL (MSEC): 158
QRS-INTERVAL (MSEC): 82
QT-INTERVAL (MSEC): 450
QTC: 418
R AXIS (DEGREES): 38
RAINBOW EXTRA TUBES HOLD SPECIMEN: NORMAL
REPORT TEXT: NORMAL
SODIUM SERPL-SCNC: 137 MMOL/L (ref 135–145)
T AXIS (DEGREES): 58
VENTRICULAR RATE EKG/MIN (BPM): 52

## 2023-01-02 PROCEDURE — 10002800 HB RX 250 W HCPCS: Performed by: EMERGENCY MEDICINE

## 2023-01-02 RX ADMIN — DROPERIDOL 2.5 MG: 2.5 INJECTION, SOLUTION INTRAMUSCULAR; INTRAVENOUS at 00:08

## 2023-01-02 RX ADMIN — DIPHENHYDRAMINE HYDROCHLORIDE 25 MG: 50 INJECTION, SOLUTION INTRAMUSCULAR; INTRAVENOUS at 00:08

## 2023-01-03 ENCOUNTER — TELEPHONE (OUTPATIENT)
Dept: NEUROLOGY | Age: 38
End: 2023-01-03

## 2023-01-03 RX ORDER — CLONAZEPAM 0.5 MG/1
0.25 TABLET ORAL 3 TIMES DAILY PRN
Qty: 45 TABLET | Refills: 0 | Status: ON HOLD | OUTPATIENT
Start: 2023-01-03 | End: 2023-01-11 | Stop reason: HOSPADM

## 2023-01-05 ENCOUNTER — E-ADVICE (OUTPATIENT)
Dept: NEUROLOGY | Age: 38
End: 2023-01-05

## 2023-01-05 ENCOUNTER — APPOINTMENT (OUTPATIENT)
Dept: CT IMAGING | Age: 38
End: 2023-01-05
Attending: STUDENT IN AN ORGANIZED HEALTH CARE EDUCATION/TRAINING PROGRAM

## 2023-01-05 ENCOUNTER — HOSPITAL ENCOUNTER (EMERGENCY)
Age: 38
Discharge: HOME | End: 2023-01-05
Attending: EMERGENCY MEDICINE

## 2023-01-05 ENCOUNTER — OFFICE VISIT (OUTPATIENT)
Dept: NEUROLOGY | Age: 38
End: 2023-01-05

## 2023-01-05 VITALS
BODY MASS INDEX: 41.63 KG/M2 | HEART RATE: 70 BPM | SYSTOLIC BLOOD PRESSURE: 117 MMHG | WEIGHT: 249.89 LBS | DIASTOLIC BLOOD PRESSURE: 79 MMHG | HEIGHT: 65 IN

## 2023-01-05 VITALS
HEART RATE: 67 BPM | OXYGEN SATURATION: 92 % | BODY MASS INDEX: 42.42 KG/M2 | HEIGHT: 64 IN | WEIGHT: 248.46 LBS | TEMPERATURE: 96.7 F | DIASTOLIC BLOOD PRESSURE: 68 MMHG | SYSTOLIC BLOOD PRESSURE: 107 MMHG | RESPIRATION RATE: 18 BRPM

## 2023-01-05 DIAGNOSIS — G44.059 SUNCT (SHORT UNILATERAL NEURALGIFORM HEADACHE, CONJUNCTIVAL INJ/TEAR): Primary | ICD-10-CM

## 2023-01-05 DIAGNOSIS — R55 SYNCOPE, UNSPECIFIED SYNCOPE TYPE: Primary | ICD-10-CM

## 2023-01-05 DIAGNOSIS — F44.5 PSYCHOGENIC NONEPILEPTIC SEIZURE: ICD-10-CM

## 2023-01-05 DIAGNOSIS — G43.101 MIGRAINE WITH AURA AND WITH STATUS MIGRAINOSUS, NOT INTRACTABLE: ICD-10-CM

## 2023-01-05 DIAGNOSIS — R56.9 SEIZURE (CMD): ICD-10-CM

## 2023-01-05 LAB
ANION GAP SERPL CALC-SCNC: 11 MMOL/L (ref 7–19)
ATRIAL RATE (BPM): 57
BASOPHILS # BLD: 0.1 K/MCL (ref 0–0.3)
BASOPHILS NFR BLD: 1 %
BUN SERPL-MCNC: 6 MG/DL (ref 6–20)
BUN/CREAT SERPL: 8 (ref 7–25)
CALCIUM SERPL-MCNC: 8.8 MG/DL (ref 8.4–10.2)
CHLORIDE SERPL-SCNC: 110 MMOL/L (ref 97–110)
CO2 SERPL-SCNC: 24 MMOL/L (ref 21–32)
CREAT SERPL-MCNC: 0.78 MG/DL (ref 0.51–0.95)
DEPRECATED RDW RBC: 37 FL (ref 39–50)
EOSINOPHIL # BLD: 0.3 K/MCL (ref 0–0.5)
EOSINOPHIL NFR BLD: 3 %
ERYTHROCYTE [DISTWIDTH] IN BLOOD: 18 % (ref 11–15)
FASTING DURATION TIME PATIENT: NORMAL H
FLUAV RNA RESP QL NAA+PROBE: NOT DETECTED
FLUBV RNA RESP QL NAA+PROBE: NOT DETECTED
GFR SERPLBLD BASED ON 1.73 SQ M-ARVRAT: >90 ML/MIN
GLUCOSE BLDC GLUCOMTR-MCNC: 98 MG/DL (ref 70–99)
GLUCOSE SERPL-MCNC: 84 MG/DL (ref 70–99)
HCG SERPL-ACNC: <2 MUNITS/ML
HCT VFR BLD CALC: 35.1 % (ref 36–46.5)
HGB BLD-MCNC: 11.1 G/DL (ref 12–15.5)
IMM GRANULOCYTES # BLD AUTO: 0 K/MCL (ref 0–0.2)
IMM GRANULOCYTES # BLD: 1 %
LYMPHOCYTES # BLD: 1.8 K/MCL (ref 1–4.8)
LYMPHOCYTES NFR BLD: 22 %
MAGNESIUM SERPL-MCNC: 2.3 MG/DL (ref 1.7–2.4)
MCH RBC QN AUTO: 20.1 PG (ref 26–34)
MCHC RBC AUTO-ENTMCNC: 31.6 G/DL (ref 32–36.5)
MCV RBC AUTO: 63.5 FL (ref 78–100)
MONOCYTES # BLD: 0.3 K/MCL (ref 0.3–0.9)
MONOCYTES NFR BLD: 4 %
NEUTROPHILS # BLD: 5.8 K/MCL (ref 1.8–7.7)
NEUTROPHILS NFR BLD: 69 %
NRBC BLD MANUAL-RTO: 0 /100 WBC
P AXIS (DEGREES): 40
PLATELET # BLD AUTO: 427 K/MCL (ref 140–450)
POTASSIUM SERPL-SCNC: 3.7 MMOL/L (ref 3.4–5.1)
PR-INTERVAL (MSEC): 133
QRS-INTERVAL (MSEC): 97
QT-INTERVAL (MSEC): 443
QTC: 436
R AXIS (DEGREES): 53
RBC # BLD: 5.53 MIL/MCL (ref 4–5.2)
REPORT TEXT: NORMAL
RSV AG NPH QL IA.RAPID: NOT DETECTED
SARS-COV-2 RNA RESP QL NAA+PROBE: NOT DETECTED
SERVICE CMNT-IMP: NORMAL
SERVICE CMNT-IMP: NORMAL
SODIUM SERPL-SCNC: 141 MMOL/L (ref 135–145)
T AXIS (DEGREES): 64
TROPONIN I SERPL DL<=0.01 NG/ML-MCNC: <4 NG/L
VENTRICULAR RATE EKG/MIN (BPM): 58
WBC # BLD: 8.3 K/MCL (ref 4.2–11)

## 2023-01-05 PROCEDURE — 10002807 HB RX 258: Performed by: STUDENT IN AN ORGANIZED HEALTH CARE EDUCATION/TRAINING PROGRAM

## 2023-01-05 PROCEDURE — 93005 ELECTROCARDIOGRAM TRACING: CPT | Performed by: STUDENT IN AN ORGANIZED HEALTH CARE EDUCATION/TRAINING PROGRAM

## 2023-01-05 PROCEDURE — 84484 ASSAY OF TROPONIN QUANT: CPT | Performed by: STUDENT IN AN ORGANIZED HEALTH CARE EDUCATION/TRAINING PROGRAM

## 2023-01-05 PROCEDURE — 96375 TX/PRO/DX INJ NEW DRUG ADDON: CPT

## 2023-01-05 PROCEDURE — 99215 OFFICE O/P EST HI 40 MIN: CPT | Performed by: PSYCHIATRY & NEUROLOGY

## 2023-01-05 PROCEDURE — 70450 CT HEAD/BRAIN W/O DYE: CPT

## 2023-01-05 PROCEDURE — 83735 ASSAY OF MAGNESIUM: CPT | Performed by: STUDENT IN AN ORGANIZED HEALTH CARE EDUCATION/TRAINING PROGRAM

## 2023-01-05 PROCEDURE — 82962 GLUCOSE BLOOD TEST: CPT

## 2023-01-05 PROCEDURE — 84702 CHORIONIC GONADOTROPIN TEST: CPT | Performed by: EMERGENCY MEDICINE

## 2023-01-05 PROCEDURE — 0241U COVID/FLU/RSV PANEL: CPT | Performed by: EMERGENCY MEDICINE

## 2023-01-05 PROCEDURE — 85025 COMPLETE CBC W/AUTO DIFF WBC: CPT | Performed by: STUDENT IN AN ORGANIZED HEALTH CARE EDUCATION/TRAINING PROGRAM

## 2023-01-05 PROCEDURE — 96374 THER/PROPH/DIAG INJ IV PUSH: CPT

## 2023-01-05 PROCEDURE — 99284 EMERGENCY DEPT VISIT MOD MDM: CPT | Performed by: STUDENT IN AN ORGANIZED HEALTH CARE EDUCATION/TRAINING PROGRAM

## 2023-01-05 PROCEDURE — C9803 HOPD COVID-19 SPEC COLLECT: HCPCS

## 2023-01-05 PROCEDURE — 80048 BASIC METABOLIC PNL TOTAL CA: CPT | Performed by: STUDENT IN AN ORGANIZED HEALTH CARE EDUCATION/TRAINING PROGRAM

## 2023-01-05 PROCEDURE — 93010 ELECTROCARDIOGRAM REPORT: CPT | Performed by: INTERNAL MEDICINE

## 2023-01-05 PROCEDURE — 99284 EMERGENCY DEPT VISIT MOD MDM: CPT

## 2023-01-05 PROCEDURE — 10002800 HB RX 250 W HCPCS: Performed by: STUDENT IN AN ORGANIZED HEALTH CARE EDUCATION/TRAINING PROGRAM

## 2023-01-05 PROCEDURE — 10002800 HB RX 250 W HCPCS: Performed by: EMERGENCY MEDICINE

## 2023-01-05 RX ORDER — FREMANEZUMAB-VFRM 225 MG/1.5ML
225 INJECTION SUBCUTANEOUS
COMMUNITY
Start: 2022-12-16 | End: 2023-06-29 | Stop reason: ALTCHOICE

## 2023-01-05 RX ORDER — GABAPENTIN 300 MG/1
600 CAPSULE ORAL 4 TIMES DAILY
Qty: 720 CAPSULE | Refills: 3 | Status: ON HOLD | OUTPATIENT
Start: 2023-01-05 | End: 2023-03-12 | Stop reason: SDUPTHER

## 2023-01-05 RX ORDER — KETOROLAC TROMETHAMINE 30 MG/ML
15 INJECTION, SOLUTION INTRAMUSCULAR; INTRAVENOUS ONCE
Status: COMPLETED | OUTPATIENT
Start: 2023-01-05 | End: 2023-01-05

## 2023-01-05 RX ORDER — ACETAMINOPHEN 325 MG/1
975 TABLET ORAL ONCE
Status: DISCONTINUED | OUTPATIENT
Start: 2023-01-05 | End: 2023-01-05 | Stop reason: HOSPADM

## 2023-01-05 RX ORDER — PROCHLORPERAZINE EDISYLATE 5 MG/ML
10 INJECTION INTRAMUSCULAR; INTRAVENOUS ONCE
Status: COMPLETED | OUTPATIENT
Start: 2023-01-05 | End: 2023-01-05

## 2023-01-05 RX ORDER — ONDANSETRON 2 MG/ML
4 INJECTION INTRAMUSCULAR; INTRAVENOUS ONCE
Status: COMPLETED | OUTPATIENT
Start: 2023-01-05 | End: 2023-01-05

## 2023-01-05 RX ORDER — DIPHENHYDRAMINE HYDROCHLORIDE 50 MG/ML
25 INJECTION INTRAMUSCULAR; INTRAVENOUS ONCE
Status: COMPLETED | OUTPATIENT
Start: 2023-01-05 | End: 2023-01-05

## 2023-01-05 RX ORDER — CEFUROXIME AXETIL 250 MG/1
6 TABLET ORAL DAILY PRN
Qty: 6 ML | Refills: 1 | Status: SHIPPED | OUTPATIENT
Start: 2023-01-05 | End: 2023-03-17

## 2023-01-05 RX ORDER — LORAZEPAM 2 MG/ML
INJECTION INTRAMUSCULAR
Status: DISCONTINUED
Start: 2023-01-05 | End: 2023-01-05 | Stop reason: WASHOUT

## 2023-01-05 RX ADMIN — ONDANSETRON 4 MG: 2 INJECTION INTRAMUSCULAR; INTRAVENOUS at 14:56

## 2023-01-05 RX ADMIN — SODIUM CHLORIDE, POTASSIUM CHLORIDE, SODIUM LACTATE AND CALCIUM CHLORIDE 1000 ML: 600; 310; 30; 20 INJECTION, SOLUTION INTRAVENOUS at 14:57

## 2023-01-05 RX ADMIN — DIPHENHYDRAMINE HYDROCHLORIDE 25 MG: 50 INJECTION, SOLUTION INTRAMUSCULAR; INTRAVENOUS at 16:28

## 2023-01-05 RX ADMIN — PROCHLORPERAZINE EDISYLATE 10 MG: 5 INJECTION INTRAMUSCULAR; INTRAVENOUS at 14:56

## 2023-01-05 RX ADMIN — KETOROLAC TROMETHAMINE 15 MG: 30 INJECTION, SOLUTION INTRAMUSCULAR at 16:28

## 2023-01-05 ASSESSMENT — PAIN DESCRIPTION - PAIN TYPE: TYPE: ACUTE PAIN

## 2023-01-05 ASSESSMENT — PAIN SCALES - GENERAL
PAINLEVEL: 8
PAINLEVEL_OUTOF10: 10

## 2023-01-06 ENCOUNTER — NURSE TRIAGE (OUTPATIENT)
Dept: TELEHEALTH | Age: 38
End: 2023-01-06

## 2023-01-06 ENCOUNTER — TELEPHONE (OUTPATIENT)
Dept: BEHAVIORAL HEALTH | Age: 38
End: 2023-01-06

## 2023-01-06 DIAGNOSIS — F43.10 PTSD (POST-TRAUMATIC STRESS DISORDER): ICD-10-CM

## 2023-01-06 DIAGNOSIS — F31.32 BIPOLAR AFFECTIVE DISORDER, CURRENTLY DEPRESSED, MODERATE (CMD): ICD-10-CM

## 2023-01-06 RX ORDER — PRAZOSIN HYDROCHLORIDE 2 MG/1
6 CAPSULE ORAL NIGHTLY
Qty: 21 CAPSULE | Refills: 0 | Status: SHIPPED | OUTPATIENT
Start: 2023-01-06 | End: 2023-01-25 | Stop reason: SDUPTHER

## 2023-01-08 ENCOUNTER — HOSPITAL ENCOUNTER (EMERGENCY)
Age: 38
Discharge: HOME | End: 2023-01-08
Attending: EMERGENCY MEDICINE

## 2023-01-08 ENCOUNTER — APPOINTMENT (OUTPATIENT)
Dept: CT IMAGING | Age: 38
End: 2023-01-08
Attending: EMERGENCY MEDICINE

## 2023-01-08 ENCOUNTER — E-ADVICE (OUTPATIENT)
Dept: NEUROLOGY | Age: 38
End: 2023-01-08

## 2023-01-08 VITALS
WEIGHT: 249.12 LBS | TEMPERATURE: 97.4 F | SYSTOLIC BLOOD PRESSURE: 128 MMHG | BODY MASS INDEX: 42.76 KG/M2 | DIASTOLIC BLOOD PRESSURE: 69 MMHG | HEART RATE: 76 BPM | RESPIRATION RATE: 16 BRPM | OXYGEN SATURATION: 98 %

## 2023-01-08 DIAGNOSIS — R55 VASOVAGAL SYNCOPE: Primary | ICD-10-CM

## 2023-01-08 DIAGNOSIS — F44.5 PSYCHOGENIC NONEPILEPTIC SEIZURE: ICD-10-CM

## 2023-01-08 LAB
ALBUMIN SERPL-MCNC: 3.6 G/DL (ref 3.6–5.1)
ALBUMIN/GLOB SERPL: 1.1 {RATIO} (ref 1–2.4)
ALP SERPL-CCNC: 56 UNITS/L (ref 45–117)
ALT SERPL-CCNC: 63 UNITS/L
ANION GAP SERPL CALC-SCNC: 9 MMOL/L (ref 7–19)
AST SERPL-CCNC: 17 UNITS/L
ATRIAL RATE (BPM): 0
ATRIAL RATE (BPM): 277
ATRIAL RATE (BPM): 70
ATRIAL RATE (BPM): 88
BASOPHILS # BLD: 0.1 K/MCL (ref 0–0.3)
BASOPHILS NFR BLD: 1 %
BILIRUB SERPL-MCNC: 0.3 MG/DL (ref 0.2–1)
BUN SERPL-MCNC: 9 MG/DL (ref 6–20)
BUN/CREAT SERPL: 11 (ref 7–25)
CALCIUM SERPL-MCNC: 8.9 MG/DL (ref 8.4–10.2)
CHLORIDE SERPL-SCNC: 111 MMOL/L (ref 97–110)
CO2 SERPL-SCNC: 26 MMOL/L (ref 21–32)
CREAT SERPL-MCNC: 0.82 MG/DL (ref 0.51–0.95)
DEPRECATED RDW RBC: 38.5 FL (ref 39–50)
EOSINOPHIL # BLD: 0.5 K/MCL (ref 0–0.5)
EOSINOPHIL NFR BLD: 6 %
ERYTHROCYTE [DISTWIDTH] IN BLOOD: 17.8 % (ref 11–15)
FASTING DURATION TIME PATIENT: ABNORMAL H
GFR SERPLBLD BASED ON 1.73 SQ M-ARVRAT: >90 ML/MIN
GLOBULIN SER-MCNC: 3.3 G/DL (ref 2–4)
GLUCOSE BLDC GLUCOMTR-MCNC: 117 MG/DL (ref 70–99)
GLUCOSE SERPL-MCNC: 106 MG/DL (ref 70–99)
HCT VFR BLD CALC: 34.8 % (ref 36–46.5)
HGB BLD-MCNC: 10.6 G/DL (ref 12–15.5)
IMM GRANULOCYTES # BLD AUTO: 0 K/MCL (ref 0–0.2)
IMM GRANULOCYTES # BLD: 0 %
LYMPHOCYTES # BLD: 2.6 K/MCL (ref 1–4.8)
LYMPHOCYTES NFR BLD: 34 %
MCH RBC QN AUTO: 19.8 PG (ref 26–34)
MCHC RBC AUTO-ENTMCNC: 30.5 G/DL (ref 32–36.5)
MCV RBC AUTO: 64.9 FL (ref 78–100)
MONOCYTES # BLD: 0.4 K/MCL (ref 0.3–0.9)
MONOCYTES NFR BLD: 5 %
NEUTROPHILS # BLD: 4.1 K/MCL (ref 1.8–7.7)
NEUTROPHILS NFR BLD: 54 %
NRBC BLD MANUAL-RTO: 0 /100 WBC
P AXIS (DEGREES): 37
P AXIS (DEGREES): 39
P AXIS (DEGREES): 50
PLATELET # BLD AUTO: 423 K/MCL (ref 140–450)
POTASSIUM SERPL-SCNC: 3.7 MMOL/L (ref 3.4–5.1)
PR-INTERVAL (MSEC): 124
PR-INTERVAL (MSEC): 130
PR-INTERVAL (MSEC): 134
PROT SERPL-MCNC: 6.9 G/DL (ref 6.4–8.2)
QRS-INTERVAL (MSEC): 0
QRS-INTERVAL (MSEC): 80
QRS-INTERVAL (MSEC): 82
QRS-INTERVAL (MSEC): 82
QT-INTERVAL (MSEC): 0
QT-INTERVAL (MSEC): 402
QT-INTERVAL (MSEC): 404
QT-INTERVAL (MSEC): 424
QTC: 0
QTC: 442
QTC: 457
QTC: 486
R AXIS (DEGREES): 0
R AXIS (DEGREES): 45
R AXIS (DEGREES): 49
R AXIS (DEGREES): 54
RAINBOW EXTRA TUBES HOLD SPECIMEN: NORMAL
RBC # BLD: 5.36 MIL/MCL (ref 4–5.2)
REPORT TEXT: NORMAL
SODIUM SERPL-SCNC: 142 MMOL/L (ref 135–145)
T AXIS (DEGREES): 0
T AXIS (DEGREES): 48
T AXIS (DEGREES): 53
T AXIS (DEGREES): 56
TROPONIN I SERPL DL<=0.01 NG/ML-MCNC: <4 NG/L
VENTRICULAR RATE EKG/MIN (BPM): 0
VENTRICULAR RATE EKG/MIN (BPM): 70
VENTRICULAR RATE EKG/MIN (BPM): 72
VENTRICULAR RATE EKG/MIN (BPM): 88
WBC # BLD: 7.7 K/MCL (ref 4.2–11)

## 2023-01-08 PROCEDURE — 85025 COMPLETE CBC W/AUTO DIFF WBC: CPT | Performed by: EMERGENCY MEDICINE

## 2023-01-08 PROCEDURE — 93005 ELECTROCARDIOGRAM TRACING: CPT | Performed by: EMERGENCY MEDICINE

## 2023-01-08 PROCEDURE — 70450 CT HEAD/BRAIN W/O DYE: CPT

## 2023-01-08 PROCEDURE — 80053 COMPREHEN METABOLIC PANEL: CPT | Performed by: EMERGENCY MEDICINE

## 2023-01-08 PROCEDURE — 72125 CT NECK SPINE W/O DYE: CPT

## 2023-01-08 PROCEDURE — 10004651 HB RX, NO CHARGE ITEM: Performed by: EMERGENCY MEDICINE

## 2023-01-08 PROCEDURE — 84484 ASSAY OF TROPONIN QUANT: CPT | Performed by: EMERGENCY MEDICINE

## 2023-01-08 PROCEDURE — G1004 CDSM NDSC: HCPCS

## 2023-01-08 PROCEDURE — 99284 EMERGENCY DEPT VISIT MOD MDM: CPT

## 2023-01-08 PROCEDURE — 36415 COLL VENOUS BLD VENIPUNCTURE: CPT

## 2023-01-08 PROCEDURE — 82962 GLUCOSE BLOOD TEST: CPT

## 2023-01-08 RX ORDER — ACETAMINOPHEN 500 MG
1000 TABLET ORAL ONCE
Status: COMPLETED | OUTPATIENT
Start: 2023-01-08 | End: 2023-01-08

## 2023-01-08 RX ADMIN — ACETAMINOPHEN 1000 MG: 500 TABLET ORAL at 07:41

## 2023-01-08 ASSESSMENT — MOVEMENT AND STRENGTH ASSESSMENTS
RLE: EQUAL STRENGTH/TONE/MOVEMENT
RUE: EQUAL STRENGTH/TONE/MOVEMENT

## 2023-01-08 ASSESSMENT — ENCOUNTER SYMPTOMS
WHEEZING: 0
DIARRHEA: 0
EYE REDNESS: 0
DIZZINESS: 0
SORE THROAT: 0
BACK PAIN: 0
POLYDIPSIA: 0
NUMBNESS: 0
FEVER: 0
CONFUSION: 0
FACIAL SWELLING: 0
CHILLS: 0
WOUND: 0
EYE DISCHARGE: 0
EYE PAIN: 0
SHORTNESS OF BREATH: 0
COUGH: 0
VOMITING: 0
NAUSEA: 0
ABDOMINAL PAIN: 0
HEADACHES: 1
BRUISES/BLEEDS EASILY: 0
ACTIVITY CHANGE: 0
POLYPHAGIA: 0
COLOR CHANGE: 0

## 2023-01-08 ASSESSMENT — PAIN SCALES - GENERAL: PAINLEVEL_OUTOF10: 0

## 2023-01-09 ENCOUNTER — HOSPITAL ENCOUNTER (OUTPATIENT)
Age: 38
Setting detail: OBSERVATION
Discharge: PSYCHIATRIC OR SUBSTANCE USE FACILITY | End: 2023-01-11
Attending: EMERGENCY MEDICINE | Admitting: INTERNAL MEDICINE

## 2023-01-09 ENCOUNTER — TELEPHONE (OUTPATIENT)
Dept: NEUROLOGY | Age: 38
End: 2023-01-09

## 2023-01-09 ENCOUNTER — APPOINTMENT (OUTPATIENT)
Dept: GENERAL RADIOLOGY | Age: 38
End: 2023-01-09
Attending: EMERGENCY MEDICINE

## 2023-01-09 DIAGNOSIS — F43.10 PTSD (POST-TRAUMATIC STRESS DISORDER): ICD-10-CM

## 2023-01-09 DIAGNOSIS — R45.851 SUICIDAL IDEATION: Primary | ICD-10-CM

## 2023-01-09 DIAGNOSIS — T65.92XA INGESTION OF SUBSTANCE, INTENTIONAL SELF-HARM, INITIAL ENCOUNTER (CMD): ICD-10-CM

## 2023-01-09 LAB
ALBUMIN SERPL-MCNC: 3.6 G/DL (ref 3.6–5.1)
ALBUMIN/GLOB SERPL: 1 {RATIO} (ref 1–2.4)
ALP SERPL-CCNC: 54 UNITS/L (ref 45–117)
ALT SERPL-CCNC: 47 UNITS/L
ANION GAP SERPL CALC-SCNC: 8 MMOL/L (ref 7–19)
APAP SERPL-MCNC: 15 MCG/ML (ref 10–30)
AST SERPL-CCNC: 21 UNITS/L
BASOPHILS # BLD: 0.1 K/MCL (ref 0–0.3)
BASOPHILS NFR BLD: 1 %
BILIRUB SERPL-MCNC: 0.4 MG/DL (ref 0.2–1)
BUN SERPL-MCNC: 7 MG/DL (ref 6–20)
BUN/CREAT SERPL: 9 (ref 7–25)
CALCIUM SERPL-MCNC: 8.7 MG/DL (ref 8.4–10.2)
CHLORIDE SERPL-SCNC: 110 MMOL/L (ref 97–110)
CO2 SERPL-SCNC: 23 MMOL/L (ref 21–32)
CREAT SERPL-MCNC: 0.76 MG/DL (ref 0.51–0.95)
DEPRECATED RDW RBC: 38.4 FL (ref 39–50)
EOSINOPHIL # BLD: 0.2 K/MCL (ref 0–0.5)
EOSINOPHIL NFR BLD: 2 %
ERYTHROCYTE [DISTWIDTH] IN BLOOD: 18.5 % (ref 11–15)
ETHANOL SERPL-MCNC: NORMAL MG/DL
FASTING DURATION TIME PATIENT: NORMAL H
FLUAV RNA RESP QL NAA+PROBE: NOT DETECTED
FLUBV RNA RESP QL NAA+PROBE: NOT DETECTED
GFR SERPLBLD BASED ON 1.73 SQ M-ARVRAT: >90 ML/MIN
GLOBULIN SER-MCNC: 3.5 G/DL (ref 2–4)
GLUCOSE SERPL-MCNC: 96 MG/DL (ref 70–99)
HCG SERPL-ACNC: <2 MUNITS/ML
HCT VFR BLD CALC: 37.3 % (ref 36–46.5)
HGB BLD-MCNC: 11.3 G/DL (ref 12–15.5)
IMM GRANULOCYTES # BLD AUTO: 0 K/MCL (ref 0–0.2)
IMM GRANULOCYTES # BLD: 0 %
LYMPHOCYTES # BLD: 1.4 K/MCL (ref 1–4.8)
LYMPHOCYTES NFR BLD: 15 %
MAGNESIUM SERPL-MCNC: 2.2 MG/DL (ref 1.7–2.4)
MCH RBC QN AUTO: 19.3 PG (ref 26–34)
MCHC RBC AUTO-ENTMCNC: 30.3 G/DL (ref 32–36.5)
MCV RBC AUTO: 63.8 FL (ref 78–100)
MONOCYTES # BLD: 0.3 K/MCL (ref 0.3–0.9)
MONOCYTES NFR BLD: 3 %
NEUTROPHILS # BLD: 7.6 K/MCL (ref 1.8–7.7)
NEUTROPHILS NFR BLD: 79 %
NRBC BLD MANUAL-RTO: 0 /100 WBC
PLATELET # BLD AUTO: 425 K/MCL (ref 140–450)
POTASSIUM SERPL-SCNC: 4.1 MMOL/L (ref 3.4–5.1)
PROT SERPL-MCNC: 7.1 G/DL (ref 6.4–8.2)
RBC # BLD: 5.85 MIL/MCL (ref 4–5.2)
RSV AG NPH QL IA.RAPID: NOT DETECTED
SALICYLATES SERPL-MCNC: <2.8 MG/DL
SARS-COV-2 RNA RESP QL NAA+PROBE: NOT DETECTED
SERVICE CMNT-IMP: NORMAL
SERVICE CMNT-IMP: NORMAL
SODIUM SERPL-SCNC: 137 MMOL/L (ref 135–145)
TROPONIN I SERPL DL<=0.01 NG/ML-MCNC: <4 NG/L
WBC # BLD: 9.6 K/MCL (ref 4.2–11)

## 2023-01-09 PROCEDURE — 83735 ASSAY OF MAGNESIUM: CPT | Performed by: EMERGENCY MEDICINE

## 2023-01-09 PROCEDURE — 85025 COMPLETE CBC W/AUTO DIFF WBC: CPT | Performed by: EMERGENCY MEDICINE

## 2023-01-09 PROCEDURE — C9803 HOPD COVID-19 SPEC COLLECT: HCPCS

## 2023-01-09 PROCEDURE — 10002800 HB RX 250 W HCPCS: Performed by: EMERGENCY MEDICINE

## 2023-01-09 PROCEDURE — 90839 PSYTX CRISIS INITIAL 60 MIN: CPT

## 2023-01-09 PROCEDURE — 84702 CHORIONIC GONADOTROPIN TEST: CPT | Performed by: EMERGENCY MEDICINE

## 2023-01-09 PROCEDURE — 71045 X-RAY EXAM CHEST 1 VIEW: CPT

## 2023-01-09 PROCEDURE — G0378 HOSPITAL OBSERVATION PER HR: HCPCS

## 2023-01-09 PROCEDURE — 99285 EMERGENCY DEPT VISIT HI MDM: CPT

## 2023-01-09 PROCEDURE — 80179 DRUG ASSAY SALICYLATE: CPT | Performed by: EMERGENCY MEDICINE

## 2023-01-09 PROCEDURE — 84484 ASSAY OF TROPONIN QUANT: CPT | Performed by: EMERGENCY MEDICINE

## 2023-01-09 PROCEDURE — 0241U COVID/FLU/RSV PANEL: CPT | Performed by: EMERGENCY MEDICINE

## 2023-01-09 PROCEDURE — 36415 COLL VENOUS BLD VENIPUNCTURE: CPT

## 2023-01-09 PROCEDURE — 82077 ASSAY SPEC XCP UR&BREATH IA: CPT | Performed by: EMERGENCY MEDICINE

## 2023-01-09 PROCEDURE — 93005 ELECTROCARDIOGRAM TRACING: CPT | Performed by: EMERGENCY MEDICINE

## 2023-01-09 PROCEDURE — 96374 THER/PROPH/DIAG INJ IV PUSH: CPT

## 2023-01-09 PROCEDURE — 80143 DRUG ASSAY ACETAMINOPHEN: CPT | Performed by: EMERGENCY MEDICINE

## 2023-01-09 PROCEDURE — 80053 COMPREHEN METABOLIC PANEL: CPT | Performed by: EMERGENCY MEDICINE

## 2023-01-09 RX ORDER — ONDANSETRON 4 MG/1
TABLET, ORALLY DISINTEGRATING ORAL
Status: DISPENSED
Start: 2023-01-09 | End: 2023-01-10

## 2023-01-09 RX ORDER — DOXEPIN HYDROCHLORIDE 50 MG/1
50 CAPSULE ORAL NIGHTLY
Qty: 30 CAPSULE | Refills: 0 | Status: ON HOLD | OUTPATIENT
Start: 2023-01-09 | End: 2023-01-19 | Stop reason: HOSPADM

## 2023-01-09 RX ORDER — LORAZEPAM 2 MG/ML
2 INJECTION INTRAMUSCULAR ONCE
Status: COMPLETED | OUTPATIENT
Start: 2023-01-09 | End: 2023-01-09

## 2023-01-09 RX ORDER — LORAZEPAM 2 MG/ML
4 INJECTION INTRAMUSCULAR ONCE
Status: DISCONTINUED | OUTPATIENT
Start: 2023-01-09 | End: 2023-01-09

## 2023-01-09 RX ADMIN — LORAZEPAM 2 MG: 2 INJECTION INTRAMUSCULAR; INTRAVENOUS at 21:25

## 2023-01-09 SDOH — ECONOMIC STABILITY: FOOD INSECURITY: HOW OFTEN IN THE PAST 12 MONTHS WERE YOU WORRIED OR STRESSED ABOUT HAVING ENOUGH MONEY TO BUY NUTRITIOUS MEALS?: NEVER

## 2023-01-09 ASSESSMENT — PAIN SCALES - GENERAL: PAINLEVEL_OUTOF10: 8

## 2023-01-09 ASSESSMENT — COGNITIVE AND FUNCTIONAL STATUS - GENERAL
BEHAVIOR: APPROPRIATE TO SITUATION;SUICIDAL/SUICIDAL IDEATION
SPEECH: CLEAR/UNDERSTANDABLE
LEVEL_OF_CONSCIOUSNESS_CALCULATED: ALERT
AFFECT: DEPRESSED;SAD
PERCEPTUAL_MISINTERPRETATIONS_HALLUCINATIONS: CLEAR REALITY BASED PERCEPTIONS
ORIENTATION: ORIENTED (PERSON/PLACE/TIME)
MOOD: FLAT;DEPRESSED
MEMORY: INTACT
ATTENTION_CALCULATED: MAINTAINS ATTENTION

## 2023-01-09 ASSESSMENT — LIFESTYLE VARIABLES
AUDIT-C TOTAL SCORE: 1
HOW OFTEN DO YOU HAVE A DRINK CONTAINING ALCOHOL: MONTHLY OR LESS
ALCOHOL_USE_STATUS: NO OR LOW RISK WITH VALIDATED TOOL
HOW MANY STANDARD DRINKS CONTAINING ALCOHOL DO YOU HAVE ON A TYPICAL DAY: 0,1 OR 2
HOW OFTEN DO YOU HAVE 6 OR MORE DRINKS ON ONE OCCASION: NEVER

## 2023-01-09 ASSESSMENT — HEART SCORE
AGE: LESS THAN OR EQUAL TO 45
RISK FACTORS: NO RISK FACTORS KNOWN
HEART SCORE: 0
TROPONIN: EQUAL OR LESS THAN NORMAL LIMIT
HISTORY: SLIGHTLY SUSPICIOUS
EKG: NORMAL

## 2023-01-09 ASSESSMENT — ENCOUNTER SYMPTOMS
HEADACHES: 1
FEVER: 0
SHORTNESS OF BREATH: 0

## 2023-01-10 ENCOUNTER — APPOINTMENT (OUTPATIENT)
Dept: NEUROLOGY | Age: 38
End: 2023-01-10

## 2023-01-10 LAB
ALBUMIN SERPL-MCNC: 3.4 G/DL (ref 3.6–5.1)
ALBUMIN SERPL-MCNC: 3.6 G/DL (ref 3.6–5.1)
ALBUMIN/GLOB SERPL: 1.1 {RATIO} (ref 1–2.4)
ALP SERPL-CCNC: 48 UNITS/L (ref 45–117)
ALP SERPL-CCNC: 49 UNITS/L (ref 45–117)
ALT SERPL-CCNC: 39 UNITS/L
ALT SERPL-CCNC: 41 UNITS/L
AMPHETAMINES UR QL SCN>500 NG/ML: NEGATIVE
ANION GAP SERPL CALC-SCNC: 9 MMOL/L (ref 7–19)
APAP SERPL-MCNC: <2 MCG/ML (ref 10–30)
APPEARANCE UR: CLEAR
AST SERPL-CCNC: 12 UNITS/L
AST SERPL-CCNC: 12 UNITS/L
ATRIAL RATE (BPM): 73
ATRIAL RATE (BPM): 87
BARBITURATES UR QL SCN>200 NG/ML: POSITIVE
BENZODIAZ UR QL SCN>200 NG/ML: NEGATIVE
BILIRUB CONJ SERPL-MCNC: 0.1 MG/DL (ref 0–0.2)
BILIRUB SERPL-MCNC: 0.5 MG/DL (ref 0.2–1)
BILIRUB SERPL-MCNC: 0.5 MG/DL (ref 0.2–1)
BILIRUB UR QL STRIP: NEGATIVE
BUN SERPL-MCNC: 8 MG/DL (ref 6–20)
BUN/CREAT SERPL: 11 (ref 7–25)
BZE UR QL SCN>150 NG/ML: NEGATIVE
CALCIUM SERPL-MCNC: 8.3 MG/DL (ref 8.4–10.2)
CANNABINOIDS UR QL SCN>50 NG/ML: POSITIVE
CHLORIDE SERPL-SCNC: 110 MMOL/L (ref 97–110)
CO2 SERPL-SCNC: 24 MMOL/L (ref 21–32)
COLOR UR: YELLOW
CREAT SERPL-MCNC: 0.72 MG/DL (ref 0.51–0.95)
DEPRECATED RDW RBC: 38.8 FL (ref 39–50)
ERYTHROCYTE [DISTWIDTH] IN BLOOD: 18.3 % (ref 11–15)
FASTING DURATION TIME PATIENT: ABNORMAL H
GFR SERPLBLD BASED ON 1.73 SQ M-ARVRAT: >90 ML/MIN
GLOBULIN SER-MCNC: 3.2 G/DL (ref 2–4)
GLUCOSE SERPL-MCNC: 90 MG/DL (ref 70–99)
GLUCOSE UR STRIP-MCNC: NEGATIVE MG/DL
HCT VFR BLD CALC: 34.3 % (ref 36–46.5)
HGB BLD-MCNC: 10.4 G/DL (ref 12–15.5)
HGB UR QL STRIP: NEGATIVE
KETONES UR STRIP-MCNC: NEGATIVE MG/DL
LEUKOCYTE ESTERASE UR QL STRIP: NEGATIVE
MAGNESIUM SERPL-MCNC: 2.1 MG/DL (ref 1.7–2.4)
MCH RBC QN AUTO: 19.4 PG (ref 26–34)
MCHC RBC AUTO-ENTMCNC: 30.3 G/DL (ref 32–36.5)
MCV RBC AUTO: 64.1 FL (ref 78–100)
NITRITE UR QL STRIP: NEGATIVE
NRBC BLD MANUAL-RTO: 0 /100 WBC
OPIATES UR QL SCN>300 NG/ML: NEGATIVE
P AXIS (DEGREES): 46
P AXIS (DEGREES): 56
PCP UR QL SCN>25 NG/ML: NEGATIVE
PH UR STRIP: 7 [PH] (ref 5–7)
PLATELET # BLD AUTO: 435 K/MCL (ref 140–450)
POTASSIUM SERPL-SCNC: 3.6 MMOL/L (ref 3.4–5.1)
PR-INTERVAL (MSEC): 148
PR-INTERVAL (MSEC): 150
PROT SERPL-MCNC: 6.6 G/DL (ref 6.4–8.2)
PROT SERPL-MCNC: 6.7 G/DL (ref 6.4–8.2)
PROT UR STRIP-MCNC: NEGATIVE MG/DL
QRS-INTERVAL (MSEC): 82
QRS-INTERVAL (MSEC): 82
QT-INTERVAL (MSEC): 396
QT-INTERVAL (MSEC): 408
QTC: 450
QTC: 476
R AXIS (DEGREES): -5
R AXIS (DEGREES): 54
RBC # BLD: 5.35 MIL/MCL (ref 4–5.2)
REPORT TEXT: NORMAL
REPORT TEXT: NORMAL
SODIUM SERPL-SCNC: 139 MMOL/L (ref 135–145)
SP GR UR STRIP: 1.02 (ref 1–1.03)
T AXIS (DEGREES): 43
T AXIS (DEGREES): 72
UROBILINOGEN UR STRIP-MCNC: 0.2 MG/DL
VENTRICULAR RATE EKG/MIN (BPM): 73
VENTRICULAR RATE EKG/MIN (BPM): 87
WBC # BLD: 9.1 K/MCL (ref 4.2–11)

## 2023-01-10 PROCEDURE — 80061 LIPID PANEL: CPT | Performed by: PSYCHIATRY & NEUROLOGY

## 2023-01-10 PROCEDURE — 51798 US URINE CAPACITY MEASURE: CPT

## 2023-01-10 PROCEDURE — 80307 DRUG TEST PRSMV CHEM ANLYZR: CPT | Performed by: EMERGENCY MEDICINE

## 2023-01-10 PROCEDURE — 10004180 HB COUNTER-TRANSPORT

## 2023-01-10 PROCEDURE — 10002803 HB RX 637: Performed by: HOSPITALIST

## 2023-01-10 PROCEDURE — G0378 HOSPITAL OBSERVATION PER HR: HCPCS

## 2023-01-10 PROCEDURE — 90792 PSYCH DIAG EVAL W/MED SRVCS: CPT | Performed by: PSYCHIATRY & NEUROLOGY

## 2023-01-10 PROCEDURE — 10002803 HB RX 637: Performed by: PSYCHIATRY & NEUROLOGY

## 2023-01-10 PROCEDURE — 85027 COMPLETE CBC AUTOMATED: CPT | Performed by: INTERNAL MEDICINE

## 2023-01-10 PROCEDURE — 81003 URINALYSIS AUTO W/O SCOPE: CPT | Performed by: HOSPITALIST

## 2023-01-10 PROCEDURE — 10002807 HB RX 258: Performed by: PSYCHIATRY & NEUROLOGY

## 2023-01-10 PROCEDURE — 36415 COLL VENOUS BLD VENIPUNCTURE: CPT | Performed by: INTERNAL MEDICINE

## 2023-01-10 PROCEDURE — 96375 TX/PRO/DX INJ NEW DRUG ADDON: CPT

## 2023-01-10 PROCEDURE — 10002801 HB RX 250 W/O HCPCS: Performed by: PSYCHIATRY & NEUROLOGY

## 2023-01-10 PROCEDURE — 83036 HEMOGLOBIN GLYCOSYLATED A1C: CPT | Performed by: PSYCHIATRY & NEUROLOGY

## 2023-01-10 PROCEDURE — 84481 FREE ASSAY (FT-3): CPT | Performed by: PSYCHIATRY & NEUROLOGY

## 2023-01-10 PROCEDURE — 10004651 HB RX, NO CHARGE ITEM: Performed by: INTERNAL MEDICINE

## 2023-01-10 PROCEDURE — 94640 AIRWAY INHALATION TREATMENT: CPT

## 2023-01-10 PROCEDURE — 10002801 HB RX 250 W/O HCPCS: Performed by: HOSPITALIST

## 2023-01-10 PROCEDURE — 83735 ASSAY OF MAGNESIUM: CPT | Performed by: INTERNAL MEDICINE

## 2023-01-10 PROCEDURE — 80053 COMPREHEN METABOLIC PANEL: CPT | Performed by: INTERNAL MEDICINE

## 2023-01-10 PROCEDURE — 51701 INSERT BLADDER CATHETER: CPT

## 2023-01-10 PROCEDURE — 80076 HEPATIC FUNCTION PANEL: CPT | Performed by: HOSPITALIST

## 2023-01-10 PROCEDURE — 10002019 HB COUNTER RESP ASSESSMENT

## 2023-01-10 PROCEDURE — 84439 ASSAY OF FREE THYROXINE: CPT | Performed by: PSYCHIATRY & NEUROLOGY

## 2023-01-10 PROCEDURE — 80143 DRUG ASSAY ACETAMINOPHEN: CPT | Performed by: HOSPITALIST

## 2023-01-10 PROCEDURE — 93010 ELECTROCARDIOGRAM REPORT: CPT | Performed by: SPECIALIST

## 2023-01-10 PROCEDURE — 93005 ELECTROCARDIOGRAM TRACING: CPT | Performed by: HOSPITALIST

## 2023-01-10 PROCEDURE — 84443 ASSAY THYROID STIM HORMONE: CPT | Performed by: PSYCHIATRY & NEUROLOGY

## 2023-01-10 RX ORDER — ALBUTEROL SULFATE 90 UG/1
2 AEROSOL, METERED RESPIRATORY (INHALATION) EVERY 4 HOURS PRN
Status: DISCONTINUED | OUTPATIENT
Start: 2023-01-10 | End: 2023-01-10

## 2023-01-10 RX ORDER — LAMOTRIGINE 100 MG/1
100 TABLET ORAL 2 TIMES DAILY
Status: DISCONTINUED | OUTPATIENT
Start: 2023-01-10 | End: 2023-01-10 | Stop reason: SDUPTHER

## 2023-01-10 RX ORDER — LAMOTRIGINE 100 MG/1
100 TABLET ORAL 2 TIMES DAILY
Status: DISCONTINUED | OUTPATIENT
Start: 2023-01-10 | End: 2023-01-11 | Stop reason: HOSPADM

## 2023-01-10 RX ORDER — LORAZEPAM 2 MG/ML
INJECTION INTRAMUSCULAR
Status: DISCONTINUED
Start: 2023-01-10 | End: 2023-01-10 | Stop reason: WASHOUT

## 2023-01-10 RX ORDER — PHENOL 1.4 %
10 AEROSOL, SPRAY (ML) MUCOUS MEMBRANE NIGHTLY
Status: ON HOLD | COMMUNITY
End: 2023-01-19 | Stop reason: HOSPADM

## 2023-01-10 RX ORDER — SUMATRIPTAN 100 MG/1
100 TABLET, FILM COATED ORAL PRN
COMMUNITY
End: 2023-09-22

## 2023-01-10 RX ORDER — 0.9 % SODIUM CHLORIDE 0.9 %
2 VIAL (ML) INJECTION EVERY 12 HOURS SCHEDULED
Status: DISCONTINUED | OUTPATIENT
Start: 2023-01-10 | End: 2023-01-11 | Stop reason: HOSPADM

## 2023-01-10 RX ORDER — ALBUTEROL SULFATE 2.5 MG/3ML
2.5 SOLUTION RESPIRATORY (INHALATION)
Status: DISCONTINUED | OUTPATIENT
Start: 2023-01-10 | End: 2023-01-11 | Stop reason: HOSPADM

## 2023-01-10 RX ORDER — GABAPENTIN 300 MG/1
600 CAPSULE ORAL 4 TIMES DAILY
Status: DISCONTINUED | OUTPATIENT
Start: 2023-01-10 | End: 2023-01-11 | Stop reason: HOSPADM

## 2023-01-10 RX ORDER — TOPIRAMATE 100 MG/1
100 TABLET, FILM COATED ORAL 2 TIMES DAILY
Status: DISCONTINUED | OUTPATIENT
Start: 2023-01-10 | End: 2023-01-11 | Stop reason: HOSPADM

## 2023-01-10 RX ORDER — BUTALBITAL, ACETAMINOPHEN AND CAFFEINE 50; 325; 40 MG/1; MG/1; MG/1
1 TABLET ORAL ONCE
Status: COMPLETED | OUTPATIENT
Start: 2023-01-10 | End: 2023-01-10

## 2023-01-10 RX ORDER — DOXEPIN HYDROCHLORIDE 50 MG/1
50 CAPSULE ORAL NIGHTLY PRN
Status: DISCONTINUED | OUTPATIENT
Start: 2023-01-10 | End: 2023-01-11 | Stop reason: HOSPADM

## 2023-01-10 RX ORDER — PRAZOSIN HYDROCHLORIDE 5 MG/1
5 CAPSULE ORAL NIGHTLY
Status: DISCONTINUED | OUTPATIENT
Start: 2023-01-10 | End: 2023-01-11 | Stop reason: HOSPADM

## 2023-01-10 RX ORDER — FLUTICASONE PROPIONATE 50 MCG
1 SPRAY, SUSPENSION (ML) NASAL DAILY PRN
Status: DISCONTINUED | OUTPATIENT
Start: 2023-01-10 | End: 2023-01-11 | Stop reason: HOSPADM

## 2023-01-10 RX ORDER — PROPRANOLOL HCL 60 MG
60 CAPSULE, EXTENDED RELEASE 24HR ORAL DAILY
Status: DISCONTINUED | OUTPATIENT
Start: 2023-01-10 | End: 2023-01-11 | Stop reason: HOSPADM

## 2023-01-10 RX ADMIN — DOXEPIN HYDROCHLORIDE 50 MG: 50 CAPSULE ORAL at 22:27

## 2023-01-10 RX ADMIN — PRAZOSIN HYDROCHLORIDE 5 MG: 5 CAPSULE ORAL at 22:27

## 2023-01-10 RX ADMIN — GABAPENTIN 600 MG: 300 CAPSULE ORAL at 13:04

## 2023-01-10 RX ADMIN — TOPIRAMATE 100 MG: 100 TABLET ORAL at 20:32

## 2023-01-10 RX ADMIN — SODIUM CHLORIDE, PRESERVATIVE FREE 2 ML: 5 INJECTION INTRAVENOUS at 20:34

## 2023-01-10 RX ADMIN — TOPIRAMATE 100 MG: 100 TABLET ORAL at 16:20

## 2023-01-10 RX ADMIN — PROPRANOLOL HYDROCHLORIDE 60 MG: 60 CAPSULE, EXTENDED RELEASE ORAL at 16:20

## 2023-01-10 RX ADMIN — BUTALBITAL, ACETAMINOPHEN, AND CAFFEINE 1 TABLET: 50; 325; 40 TABLET ORAL at 13:44

## 2023-01-10 RX ADMIN — ALBUTEROL SULFATE 2.5 MG: 2.5 SOLUTION RESPIRATORY (INHALATION) at 23:40

## 2023-01-10 RX ADMIN — DEXTROSE MONOHYDRATE 1000 MG: 50 INJECTION, SOLUTION INTRAVENOUS at 13:06

## 2023-01-10 RX ADMIN — GABAPENTIN 600 MG: 300 CAPSULE ORAL at 16:20

## 2023-01-10 RX ADMIN — SODIUM CHLORIDE, PRESERVATIVE FREE 2 ML: 5 INJECTION INTRAVENOUS at 13:06

## 2023-01-10 RX ADMIN — GABAPENTIN 600 MG: 300 CAPSULE ORAL at 20:32

## 2023-01-10 RX ADMIN — LAMOTRIGINE 100 MG: 100 TABLET ORAL at 20:32

## 2023-01-10 SDOH — ECONOMIC STABILITY: TRANSPORTATION INSECURITY
IN THE PAST 12 MONTHS, HAS THE LACK OF TRANSPORTATION KEPT YOU FROM MEDICAL APPOINTMENTS OR FROM GETTING MEDICATIONS?: YES

## 2023-01-10 SDOH — HEALTH STABILITY: PHYSICAL HEALTH: DO YOU HAVE DIFFICULTY DRESSING OR BATHING?: NO

## 2023-01-10 SDOH — SOCIAL STABILITY: SOCIAL NETWORK
HOW OFTEN DO YOU SEE OR TALK TO PEOPLE THAT YOU CARE ABOUT AND FEEL CLOSE TO? (FOR EXAMPLE: TALKING TO FRIENDS ON THE PHONE, VISITING FRIENDS OR FAMILY, GOING TO CHURCH OR CLUB MEETINGS): 3 TO 5 TIMES A WEEK

## 2023-01-10 SDOH — ECONOMIC STABILITY: TRANSPORTATION INSECURITY
IN THE PAST 12 MONTHS, HAS LACK OF TRANSPORTATION KEPT YOU FROM MEETINGS, WORK, OR FROM GETTING THINGS NEEDED FOR DAILY LIVING?: YES

## 2023-01-10 SDOH — HEALTH STABILITY: GENERAL: BECAUSE OF A PHYSICAL, MENTAL, OR EMOTIONAL CONDITION, DO YOU HAVE DIFFICULTY DOING ERRANDS ALONE?: YES

## 2023-01-10 SDOH — ECONOMIC STABILITY: HOUSING INSECURITY: WHAT IS YOUR LIVING SITUATION TODAY?: SPOUSE

## 2023-01-10 SDOH — ECONOMIC STABILITY: GENERAL

## 2023-01-10 SDOH — ECONOMIC STABILITY: HOUSING INSECURITY: ARE YOU WORRIED ABOUT LOSING YOUR HOUSING?: NO

## 2023-01-10 SDOH — ECONOMIC STABILITY: FOOD INSECURITY: HOW OFTEN IN THE PAST 12 MONTHS WERE YOU WORRIED OR STRESSED ABOUT HAVING ENOUGH MONEY TO BUY NUTRITIOUS MEALS?: NEVER

## 2023-01-10 SDOH — SOCIAL STABILITY: SOCIAL NETWORK: SUPPORT SYSTEMS: SIGNIFICANT OTHER

## 2023-01-10 SDOH — ECONOMIC STABILITY: HOUSING INSECURITY: WHAT IS YOUR LIVING SITUATION TODAY?: APARTMENT

## 2023-01-10 SDOH — HEALTH STABILITY: GENERAL
BECAUSE OF A PHYSICAL, MENTAL, OR EMOTIONAL CONDITION, DO YOU HAVE SERIOUS DIFFICULTY CONCENTRATING, REMEMBERING OR MAKING DECISIONS?: YES

## 2023-01-10 SDOH — HEALTH STABILITY: PHYSICAL HEALTH: DO YOU HAVE SERIOUS DIFFICULTY WALKING OR CLIMBING STAIRS?: NO

## 2023-01-10 ASSESSMENT — ENCOUNTER SYMPTOMS
CHEST TIGHTNESS: 0
NUMBNESS: 0
ABDOMINAL PAIN: 0
NAUSEA: 0
EYE PAIN: 0
PHOTOPHOBIA: 0
BRUISES/BLEEDS EASILY: 0
BLOOD IN STOOL: 0
ACTIVITY CHANGE: 0
SORE THROAT: 0
BACK PAIN: 0
DIARRHEA: 0
POLYDIPSIA: 0
CHILLS: 0
HEADACHES: 1
WOUND: 0
SHORTNESS OF BREATH: 0
APNEA: 0
COUGH: 0
SEIZURES: 0
VOMITING: 0
FEVER: 0

## 2023-01-10 ASSESSMENT — PAIN SCALES - GENERAL
PAINLEVEL_OUTOF10: 8
PAINLEVEL_OUTOF10: 8
PAINLEVEL_OUTOF10: 0

## 2023-01-10 ASSESSMENT — PATIENT HEALTH QUESTIONNAIRE - PHQ9
CLINICAL INTERPRETATION OF PHQ9 SCORE: SEVERE DEPRESSION
3. TROUBLE FALLING OR STAYING ASLEEP OR SLEEPING TOO MUCH: NEARLY EVERY DAY
4. FEELING TIRED OR HAVING LITTLE ENERGY: MORE THAN HALF THE DAYS
IS PATIENT ABLE TO COMPLETE PHQ2 OR PHQ9: YES
5. POOR APPETITE OR OVEREATING: NEARLY EVERY DAY
6. FEELING BAD ABOUT YOURSELF - OR THAT YOU ARE A FAILURE OR HAVE LET YOURSELF OR YOUR FAMILY DOWN: NEARLY EVERY DAY
8. MOVING OR SPEAKING SO SLOWLY THAT OTHER PEOPLE COULD HAVE NOTICED. OR THE OPPOSITE, BEING SO FIGETY OR RESTLESS THAT YOU HAVE BEEN MOVING AROUND A LOT MORE THAN USUAL: NEARLY EVERY DAY
CLINICAL INTERPRETATION OF PHQ2 SCORE: FURTHER SCREENING NEEDED
SUM OF ALL RESPONSES TO PHQ9 QUESTIONS 1 AND 2: 4
7. TROUBLE CONCENTRATING ON THINGS, SUCH AS READING THE NEWSPAPER OR WATCHING TELEVISION: MORE THAN HALF THE DAYS
SUM OF ALL RESPONSES TO PHQ QUESTIONS 1-9: 21
9. THOUGHTS THAT YOU WOULD BE BETTER OFF DEAD, OR OF HURTING YOURSELF: SEVERAL DAYS
SUM OF ALL RESPONSES TO PHQ9 QUESTIONS 1 AND 2: 4
1. LITTLE INTEREST OR PLEASURE IN DOING THINGS: MORE THAN HALF THE DAYS
2. FEELING DOWN, DEPRESSED OR HOPELESS: MORE THAN HALF THE DAYS
10. IF YOU CHECKED OFF ANY PROBLEMS, HOW DIFFICULT HAVE THESE PROBLEMS MADE IT FOR YOU TO DO YOUR WORK, TAKE CARE OF THINGS AT HOME, OR GET ALONG WITH OTHER PEOPLE: EXTREMELY DIFFICULT

## 2023-01-10 ASSESSMENT — ACTIVITIES OF DAILY LIVING (ADL)
ADL_SCORE: 12
RECENT_DECLINE_ADL: NO
ADL_SHORT_OF_BREATH: NO
ADL_BEFORE_ADMISSION: INDEPENDENT

## 2023-01-10 ASSESSMENT — LIFESTYLE VARIABLES
HOW OFTEN DO YOU HAVE A DRINK CONTAINING ALCOHOL: NEVER
HOW OFTEN DO YOU HAVE 6 OR MORE DRINKS ON ONE OCCASION: NEVER
HOW MANY STANDARD DRINKS CONTAINING ALCOHOL DO YOU HAVE ON A TYPICAL DAY: 0,1 OR 2
AUDIT-C TOTAL SCORE: 0
ALCOHOL_USE_STATUS: NO OR LOW RISK WITH VALIDATED TOOL

## 2023-01-10 ASSESSMENT — COLUMBIA-SUICIDE SEVERITY RATING SCALE - C-SSRS
3. HAVE YOU BEEN THINKING ABOUT HOW YOU MIGHT KILL YOURSELF?: YES
5. HAVE YOU STARTED TO WORK OUT OR WORKED OUT THE DETAILS OF HOW TO KILL YOURSELF? DO YOU INTEND TO CARRY OUT THIS PLAN?: YES
1. WITHIN THE PAST MONTH, HAVE YOU WISHED YOU WERE DEAD OR WISHED YOU COULD GO TO SLEEP AND NOT WAKE UP?: YES
2. HAVE YOU ACTUALLY HAD ANY THOUGHTS OF KILLING YOURSELF?: YES
HOW LONG AGO DID YOU DO ANY OF THESE?: BETWEEN THREE MONTHS AND A YEAR AGO
6. HAVE YOU EVER DONE ANYTHING, STARTED TO DO ANYTHING, OR PREPARED TO DO ANYTHING TO END YOUR LIFE?: YES
IS THE PATIENT ABLE TO COMPLETE C-SSRS: YES
4. HAVE YOU HAD THESE THOUGHTS AND HAD SOME INTENTION OF ACTING ON THEM?: YES

## 2023-01-10 ASSESSMENT — PULMONARY FUNCTION TESTS: FEV1/FVC: UNABLE TO OBTAIN, OR GREATER THAN 70%

## 2023-01-11 ENCOUNTER — HOSPITAL ENCOUNTER (INPATIENT)
Age: 38
LOS: 8 days | Discharge: HOME | DRG: 755 | End: 2023-01-19
Attending: PSYCHIATRY & NEUROLOGY | Admitting: PSYCHIATRY & NEUROLOGY

## 2023-01-11 VITALS
SYSTOLIC BLOOD PRESSURE: 105 MMHG | WEIGHT: 252.21 LBS | HEIGHT: 65 IN | TEMPERATURE: 98.8 F | RESPIRATION RATE: 18 BRPM | DIASTOLIC BLOOD PRESSURE: 69 MMHG | HEART RATE: 75 BPM | BODY MASS INDEX: 42.02 KG/M2 | OXYGEN SATURATION: 97 %

## 2023-01-11 DIAGNOSIS — F31.32 BIPOLAR AFFECTIVE DISORDER, CURRENTLY DEPRESSED, MODERATE (CMD): Primary | ICD-10-CM

## 2023-01-11 LAB
HBA1C MFR BLD: 4.9 % (ref 4.5–5.6)
T3FREE SERPL-MCNC: 1.7 PG/ML (ref 2.2–4)
T4 FREE SERPL-MCNC: 1 NG/DL (ref 0.8–1.5)
TSH SERPL-ACNC: 0.29 MCUNITS/ML (ref 0.35–5)

## 2023-01-11 PROCEDURE — 10002803 HB RX 637: Performed by: PSYCHIATRY & NEUROLOGY

## 2023-01-11 PROCEDURE — 10004651 HB RX, NO CHARGE ITEM: Performed by: INTERNAL MEDICINE

## 2023-01-11 PROCEDURE — 10004577 HB ROOM CHARGE PSYCH

## 2023-01-11 PROCEDURE — G0378 HOSPITAL OBSERVATION PER HR: HCPCS

## 2023-01-11 PROCEDURE — 10002803 HB RX 637: Performed by: HOSPITALIST

## 2023-01-11 RX ORDER — BENZTROPINE MESYLATE 1 MG/1
1 TABLET ORAL EVERY 6 HOURS PRN
Status: DISCONTINUED | OUTPATIENT
Start: 2023-01-11 | End: 2023-01-16

## 2023-01-11 RX ORDER — SUMATRIPTAN 50 MG/1
100 TABLET, FILM COATED ORAL DAILY PRN
Status: DISCONTINUED | OUTPATIENT
Start: 2023-01-11 | End: 2023-01-19 | Stop reason: HOSPADM

## 2023-01-11 RX ORDER — ALBUTEROL SULFATE 90 UG/1
2 AEROSOL, METERED RESPIRATORY (INHALATION) EVERY 4 HOURS PRN
Status: DISCONTINUED | OUTPATIENT
Start: 2023-01-11 | End: 2023-01-19 | Stop reason: HOSPADM

## 2023-01-11 RX ORDER — TOPIRAMATE 100 MG/1
100 TABLET, FILM COATED ORAL 2 TIMES DAILY
Status: DISCONTINUED | OUTPATIENT
Start: 2023-01-11 | End: 2023-01-19 | Stop reason: HOSPADM

## 2023-01-11 RX ORDER — HALOPERIDOL 5 MG/ML
5 INJECTION INTRAMUSCULAR EVERY 6 HOURS PRN
Status: DISCONTINUED | OUTPATIENT
Start: 2023-01-11 | End: 2023-01-19 | Stop reason: HOSPADM

## 2023-01-11 RX ORDER — BENZTROPINE MESYLATE 1 MG/ML
1 INJECTION INTRAMUSCULAR; INTRAVENOUS EVERY 6 HOURS PRN
Status: DISCONTINUED | OUTPATIENT
Start: 2023-01-11 | End: 2023-01-16

## 2023-01-11 RX ORDER — MAGNESIUM HYDROXIDE/ALUMINUM HYDROXICE/SIMETHICONE 120; 1200; 1200 MG/30ML; MG/30ML; MG/30ML
30 SUSPENSION ORAL EVERY 4 HOURS PRN
Status: DISCONTINUED | OUTPATIENT
Start: 2023-01-11 | End: 2023-01-19 | Stop reason: HOSPADM

## 2023-01-11 RX ORDER — ONDANSETRON 4 MG/1
4 TABLET, ORALLY DISINTEGRATING ORAL EVERY 8 HOURS PRN
Status: DISCONTINUED | OUTPATIENT
Start: 2023-01-11 | End: 2023-01-19 | Stop reason: HOSPADM

## 2023-01-11 RX ORDER — DOXEPIN HYDROCHLORIDE 50 MG/1
50 CAPSULE ORAL NIGHTLY PRN
Status: DISCONTINUED | OUTPATIENT
Start: 2023-01-11 | End: 2023-01-12

## 2023-01-11 RX ORDER — IBUPROFEN 400 MG/1
400 TABLET ORAL EVERY 6 HOURS PRN
Status: DISCONTINUED | OUTPATIENT
Start: 2023-01-11 | End: 2023-01-19 | Stop reason: HOSPADM

## 2023-01-11 RX ORDER — FLUTICASONE PROPIONATE 50 MCG
1 SPRAY, SUSPENSION (ML) NASAL DAILY PRN
Status: DISCONTINUED | OUTPATIENT
Start: 2023-01-11 | End: 2023-01-19 | Stop reason: HOSPADM

## 2023-01-11 RX ORDER — GABAPENTIN 300 MG/1
600 CAPSULE ORAL 4 TIMES DAILY
Status: DISCONTINUED | OUTPATIENT
Start: 2023-01-11 | End: 2023-01-19 | Stop reason: HOSPADM

## 2023-01-11 RX ORDER — PRAZOSIN HYDROCHLORIDE 5 MG/1
5 CAPSULE ORAL NIGHTLY
Status: DISCONTINUED | OUTPATIENT
Start: 2023-01-11 | End: 2023-01-19 | Stop reason: HOSPADM

## 2023-01-11 RX ORDER — LAMOTRIGINE 100 MG/1
100 TABLET ORAL 2 TIMES DAILY
Status: DISCONTINUED | OUTPATIENT
Start: 2023-01-11 | End: 2023-01-19 | Stop reason: HOSPADM

## 2023-01-11 RX ORDER — PROPRANOLOL HCL 60 MG
60 CAPSULE, EXTENDED RELEASE 24HR ORAL DAILY
Status: DISCONTINUED | OUTPATIENT
Start: 2023-01-11 | End: 2023-01-19 | Stop reason: HOSPADM

## 2023-01-11 RX ORDER — CETIRIZINE HYDROCHLORIDE 10 MG/1
10 TABLET ORAL DAILY PRN
Status: DISCONTINUED | OUTPATIENT
Start: 2023-01-11 | End: 2023-01-19 | Stop reason: HOSPADM

## 2023-01-11 RX ORDER — HALOPERIDOL 5 MG/1
5 TABLET ORAL EVERY 6 HOURS PRN
Status: DISCONTINUED | OUTPATIENT
Start: 2023-01-11 | End: 2023-01-19 | Stop reason: HOSPADM

## 2023-01-11 RX ORDER — HYDROXYZINE HYDROCHLORIDE 25 MG/1
50 TABLET, FILM COATED ORAL 3 TIMES DAILY PRN
Status: DISCONTINUED | OUTPATIENT
Start: 2023-01-11 | End: 2023-01-19 | Stop reason: HOSPADM

## 2023-01-11 RX ADMIN — PROPRANOLOL HYDROCHLORIDE 60 MG: 60 CAPSULE, EXTENDED RELEASE ORAL at 09:03

## 2023-01-11 RX ADMIN — GABAPENTIN 600 MG: 300 CAPSULE ORAL at 09:03

## 2023-01-11 RX ADMIN — SODIUM CHLORIDE, PRESERVATIVE FREE 2 ML: 5 INJECTION INTRAVENOUS at 09:03

## 2023-01-11 RX ADMIN — PRAZOSIN HYDROCHLORIDE 5 MG: 5 CAPSULE ORAL at 21:23

## 2023-01-11 RX ADMIN — TOPIRAMATE 100 MG: 100 TABLET ORAL at 21:23

## 2023-01-11 RX ADMIN — GABAPENTIN 600 MG: 300 CAPSULE ORAL at 18:03

## 2023-01-11 RX ADMIN — LAMOTRIGINE 100 MG: 100 TABLET ORAL at 21:23

## 2023-01-11 RX ADMIN — TOPIRAMATE 100 MG: 100 TABLET ORAL at 09:03

## 2023-01-11 RX ADMIN — VORTIOXETINE 20 MG: 10 TABLET, FILM COATED ORAL at 09:03

## 2023-01-11 RX ADMIN — LAMOTRIGINE 100 MG: 100 TABLET ORAL at 09:03

## 2023-01-11 RX ADMIN — GABAPENTIN 600 MG: 300 CAPSULE ORAL at 21:23

## 2023-01-11 RX ADMIN — DOXEPIN HYDROCHLORIDE 50 MG: 50 CAPSULE ORAL at 21:23

## 2023-01-11 RX ADMIN — GABAPENTIN 600 MG: 300 CAPSULE ORAL at 13:18

## 2023-01-11 SDOH — ECONOMIC STABILITY: GENERAL

## 2023-01-11 SDOH — ECONOMIC STABILITY: HOUSING INSECURITY: ARE YOU WORRIED ABOUT LOSING YOUR HOUSING?: NO

## 2023-01-11 SDOH — ECONOMIC STABILITY: FOOD INSECURITY: HOW OFTEN IN THE PAST 12 MONTHS WERE YOU WORRIED OR STRESSED ABOUT HAVING ENOUGH MONEY TO BUY NUTRITIOUS MEALS?: NEVER

## 2023-01-11 ASSESSMENT — LIFESTYLE VARIABLES
AT WHAT AGE STARTED USING: CHILDHOOD(,18 ONLY)
ARE YOU BLIND OR DO YOU HAVE SERIOUS DIFFICULTY SEEING, EVEN WHEN WEARING GLASSES: NO
HOW MANY STANDARD DRINKS CONTAINING ALCOHOL DO YOU HAVE ON A TYPICAL DAY: 0,1 OR 2
TOBACCO_USE_STATUS_IN_THE_LAST_30_DAYS: USED TOBACCO IN THE LAST 30 DAYS
ALCOHOL_USE_STATUS: NO OR LOW RISK WITH VALIDATED TOOL
HOW OFTEN DO YOU HAVE A DRINK CONTAINING ALCOHOL: NEVER
AMOUNT_OF_TOBACCO_USED: FIVE OR MORE CIGARETTES PER DAY AND/OR CIGAR OR PIPE DAILY
AUDIT-C TOTAL SCORE: 0
RECENT DECLINE IN ADLS: NO
HOW OFTEN DO YOU HAVE 6 OR MORE DRINKS ON ONE OCCASION: NEVER
SHORT OF BREATH OR FATIGUE WITH ADLS: NO
ARE YOU DEAF OR DO YOU HAVE SERIOUS DIFFICULTY  HEARING: NO
TYPE_OF_TOBACCO_USED: CIGARETTES
AT WHAT AGE STARTED USING: CHILDHOOD(<18 ONLY)
HAVE YOU EVER BEEN EXPOSED TO OTHER VERY DISTURBING, TRAUMATIC OR ANXIETY PROVOKING EVENTS IN YOUR LIFETIME?: YES
ADL BEFORE ADMISSION: INDEPENDENT
ADL NEEDS ASSIST: NO
HAVE YOU EVER BEEN VERBALLY, EMOTIONALLY, PHYSICALLY OR SEXUALLY ABUSED, OR ABUSED VIA SOCIAL MEDIA?: YES

## 2023-01-11 ASSESSMENT — PAIN SCALES - GENERAL
PAINLEVEL_OUTOF10: 0

## 2023-01-11 ASSESSMENT — PATIENT HEALTH QUESTIONNAIRE - PHQ9
2. FEELING DOWN, DEPRESSED OR HOPELESS: NEARLY EVERY DAY
1. LITTLE INTEREST OR PLEASURE IN DOING THINGS: NEARLY EVERY DAY
8. MOVING OR SPEAKING SO SLOWLY THAT OTHER PEOPLE COULD HAVE NOTICED. OR THE OPPOSITE, BEING SO FIGETY OR RESTLESS THAT YOU HAVE BEEN MOVING AROUND A LOT MORE THAN USUAL: NEARLY EVERY DAY
CLINICAL INTERPRETATION OF PHQ9 SCORE: SEVERE DEPRESSION
9. THOUGHTS THAT YOU WOULD BE BETTER OFF DEAD, OR OF HURTING YOURSELF: NEARLY EVERY DAY
7. TROUBLE CONCENTRATING ON THINGS, SUCH AS READING THE NEWSPAPER OR WATCHING TELEVISION: NEARLY EVERY DAY
CLINICAL INTERPRETATION OF PHQ2 SCORE: FURTHER SCREENING NEEDED
4. FEELING TIRED OR HAVING LITTLE ENERGY: NEARLY EVERY DAY
SUM OF ALL RESPONSES TO PHQ9 QUESTIONS 1 AND 2: 6
SUM OF ALL RESPONSES TO PHQ QUESTIONS 1-9: 27
SUM OF ALL RESPONSES TO PHQ9 QUESTIONS 1 AND 2: 6
IS PATIENT ABLE TO COMPLETE PHQ2 OR PHQ9: YES
6. FEELING BAD ABOUT YOURSELF - OR THAT YOU ARE A FAILURE OR HAVE LET YOURSELF OR YOUR FAMILY DOWN: NEARLY EVERY DAY
5. POOR APPETITE OR OVEREATING: NEARLY EVERY DAY
10. IF YOU CHECKED OFF ANY PROBLEMS, HOW DIFFICULT HAVE THESE PROBLEMS MADE IT FOR YOU TO DO YOUR WORK, TAKE CARE OF THINGS AT HOME, OR GET ALONG WITH OTHER PEOPLE: EXTREMELY DIFFICULT
3. TROUBLE FALLING OR STAYING ASLEEP OR SLEEPING TOO MUCH: NEARLY EVERY DAY

## 2023-01-11 ASSESSMENT — ACTIVITIES OF DAILY LIVING (ADL): ADL_SCORE: 12

## 2023-01-12 ENCOUNTER — APPOINTMENT (OUTPATIENT)
Dept: BEHAVIORAL HEALTH | Age: 38
End: 2023-01-12

## 2023-01-12 PROBLEM — R41.82 ALTERED MENTAL STATUS, UNSPECIFIED ALTERED MENTAL STATUS TYPE: Status: RESOLVED | Noted: 2022-10-19 | Resolved: 2023-01-12

## 2023-01-12 PROBLEM — F31.32 BIPOLAR AFFECTIVE DISORDER, CURRENTLY DEPRESSED, MODERATE (CMD): Status: RESOLVED | Noted: 2022-08-03 | Resolved: 2023-01-12

## 2023-01-12 PROBLEM — F41.9 ANXIETY: Status: RESOLVED | Noted: 2022-08-03 | Resolved: 2023-01-12

## 2023-01-12 PROBLEM — F60.3 BORDERLINE PERSONALITY DISORDER  (CMD): Status: ACTIVE | Noted: 2023-01-12

## 2023-01-12 PROBLEM — F39 MOOD DISORDER (CMD): Status: ACTIVE | Noted: 2023-01-12

## 2023-01-12 LAB
CHOLEST SERPL-MCNC: 170 MG/DL
CHOLEST/HDLC SERPL: 4.1 {RATIO}
FASTING DURATION TIME PATIENT: ABNORMAL H
HDLC SERPL-MCNC: 41 MG/DL
LDLC SERPL CALC-MCNC: 101 MG/DL
NONHDLC SERPL-MCNC: 129 MG/DL
TRIGL SERPL-MCNC: 140 MG/DL

## 2023-01-12 PROCEDURE — 10004577 HB ROOM CHARGE PSYCH

## 2023-01-12 PROCEDURE — 10002803 HB RX 637: Performed by: HOSPITALIST

## 2023-01-12 PROCEDURE — 10002803 HB RX 637: Performed by: PSYCHIATRY & NEUROLOGY

## 2023-01-12 PROCEDURE — 90792 PSYCH DIAG EVAL W/MED SRVCS: CPT | Performed by: PSYCHIATRY & NEUROLOGY

## 2023-01-12 RX ORDER — TRAZODONE HYDROCHLORIDE 100 MG/1
100 TABLET ORAL NIGHTLY PRN
Status: DISCONTINUED | OUTPATIENT
Start: 2023-01-12 | End: 2023-01-19 | Stop reason: HOSPADM

## 2023-01-12 RX ADMIN — LAMOTRIGINE 100 MG: 100 TABLET ORAL at 21:41

## 2023-01-12 RX ADMIN — LAMOTRIGINE 100 MG: 100 TABLET ORAL at 08:52

## 2023-01-12 RX ADMIN — TRAZODONE HYDROCHLORIDE 100 MG: 100 TABLET ORAL at 21:41

## 2023-01-12 RX ADMIN — TOPIRAMATE 100 MG: 100 TABLET ORAL at 08:52

## 2023-01-12 RX ADMIN — GABAPENTIN 600 MG: 300 CAPSULE ORAL at 21:41

## 2023-01-12 RX ADMIN — GABAPENTIN 600 MG: 300 CAPSULE ORAL at 08:52

## 2023-01-12 RX ADMIN — GABAPENTIN 600 MG: 300 CAPSULE ORAL at 17:12

## 2023-01-12 RX ADMIN — HYDROXYZINE HYDROCHLORIDE 50 MG: 25 TABLET ORAL at 21:41

## 2023-01-12 RX ADMIN — PRAZOSIN HYDROCHLORIDE 5 MG: 5 CAPSULE ORAL at 21:41

## 2023-01-12 RX ADMIN — GABAPENTIN 600 MG: 300 CAPSULE ORAL at 12:42

## 2023-01-12 RX ADMIN — PROPRANOLOL HYDROCHLORIDE 60 MG: 60 CAPSULE, EXTENDED RELEASE ORAL at 08:52

## 2023-01-12 RX ADMIN — VORTIOXETINE 20 MG: 10 TABLET, FILM COATED ORAL at 08:52

## 2023-01-12 RX ADMIN — SUMATRIPTAN SUCCINATE 100 MG: 50 TABLET ORAL at 10:54

## 2023-01-12 RX ADMIN — TOPIRAMATE 100 MG: 100 TABLET ORAL at 21:41

## 2023-01-12 ASSESSMENT — PAIN SCALES - GENERAL
PAINLEVEL_OUTOF10: 0
PAINLEVEL_OUTOF10: 0

## 2023-01-13 PROCEDURE — 99233 SBSQ HOSP IP/OBS HIGH 50: CPT | Performed by: PSYCHIATRY & NEUROLOGY

## 2023-01-13 PROCEDURE — 10002803 HB RX 637: Performed by: HOSPITALIST

## 2023-01-13 PROCEDURE — 10002803 HB RX 637: Performed by: PSYCHIATRY & NEUROLOGY

## 2023-01-13 PROCEDURE — 10004577 HB ROOM CHARGE PSYCH

## 2023-01-13 RX ADMIN — TOPIRAMATE 100 MG: 100 TABLET ORAL at 21:40

## 2023-01-13 RX ADMIN — PROPRANOLOL HYDROCHLORIDE 60 MG: 60 CAPSULE, EXTENDED RELEASE ORAL at 09:03

## 2023-01-13 RX ADMIN — GABAPENTIN 600 MG: 300 CAPSULE ORAL at 12:48

## 2023-01-13 RX ADMIN — LAMOTRIGINE 100 MG: 100 TABLET ORAL at 09:03

## 2023-01-13 RX ADMIN — GABAPENTIN 600 MG: 300 CAPSULE ORAL at 09:03

## 2023-01-13 RX ADMIN — GABAPENTIN 600 MG: 300 CAPSULE ORAL at 21:40

## 2023-01-13 RX ADMIN — TRAZODONE HYDROCHLORIDE 100 MG: 100 TABLET ORAL at 21:40

## 2023-01-13 RX ADMIN — HYDROXYZINE HYDROCHLORIDE 50 MG: 25 TABLET ORAL at 20:38

## 2023-01-13 RX ADMIN — TOPIRAMATE 100 MG: 100 TABLET ORAL at 09:03

## 2023-01-13 RX ADMIN — GABAPENTIN 600 MG: 300 CAPSULE ORAL at 18:05

## 2023-01-13 RX ADMIN — VORTIOXETINE 20 MG: 10 TABLET, FILM COATED ORAL at 09:03

## 2023-01-13 RX ADMIN — PRAZOSIN HYDROCHLORIDE 5 MG: 5 CAPSULE ORAL at 21:39

## 2023-01-13 RX ADMIN — LAMOTRIGINE 100 MG: 100 TABLET ORAL at 21:40

## 2023-01-13 ASSESSMENT — PAIN SCALES - GENERAL: PAINLEVEL_OUTOF10: 0

## 2023-01-14 PROCEDURE — 99232 SBSQ HOSP IP/OBS MODERATE 35: CPT | Performed by: PSYCHIATRY & NEUROLOGY

## 2023-01-14 PROCEDURE — 10002803 HB RX 637: Performed by: HOSPITALIST

## 2023-01-14 PROCEDURE — 10004577 HB ROOM CHARGE PSYCH

## 2023-01-14 PROCEDURE — 10002803 HB RX 637: Performed by: PSYCHIATRY & NEUROLOGY

## 2023-01-14 RX ADMIN — GABAPENTIN 600 MG: 300 CAPSULE ORAL at 13:08

## 2023-01-14 RX ADMIN — SUMATRIPTAN SUCCINATE 100 MG: 50 TABLET ORAL at 11:29

## 2023-01-14 RX ADMIN — GABAPENTIN 600 MG: 300 CAPSULE ORAL at 22:07

## 2023-01-14 RX ADMIN — GABAPENTIN 600 MG: 300 CAPSULE ORAL at 17:43

## 2023-01-14 RX ADMIN — PROPRANOLOL HYDROCHLORIDE 60 MG: 60 CAPSULE, EXTENDED RELEASE ORAL at 08:59

## 2023-01-14 RX ADMIN — HYDROXYZINE HYDROCHLORIDE 50 MG: 25 TABLET ORAL at 18:48

## 2023-01-14 RX ADMIN — TOPIRAMATE 100 MG: 100 TABLET ORAL at 08:59

## 2023-01-14 RX ADMIN — TRAZODONE HYDROCHLORIDE 100 MG: 100 TABLET ORAL at 22:07

## 2023-01-14 RX ADMIN — TOPIRAMATE 100 MG: 100 TABLET ORAL at 22:07

## 2023-01-14 RX ADMIN — LAMOTRIGINE 100 MG: 100 TABLET ORAL at 22:07

## 2023-01-14 RX ADMIN — PRAZOSIN HYDROCHLORIDE 5 MG: 5 CAPSULE ORAL at 22:07

## 2023-01-14 RX ADMIN — VORTIOXETINE 20 MG: 10 TABLET, FILM COATED ORAL at 08:59

## 2023-01-14 RX ADMIN — HYDROXYZINE HYDROCHLORIDE 50 MG: 25 TABLET ORAL at 22:07

## 2023-01-14 RX ADMIN — GABAPENTIN 600 MG: 300 CAPSULE ORAL at 08:59

## 2023-01-14 RX ADMIN — LAMOTRIGINE 100 MG: 100 TABLET ORAL at 08:59

## 2023-01-14 ASSESSMENT — PAIN SCALES - GENERAL
PAINLEVEL_OUTOF10: 0
PAINLEVEL_OUTOF10: 0

## 2023-01-15 PROCEDURE — 10002803 HB RX 637: Performed by: PSYCHIATRY & NEUROLOGY

## 2023-01-15 PROCEDURE — 10002800 HB RX 250 W HCPCS: Performed by: PSYCHIATRY & NEUROLOGY

## 2023-01-15 PROCEDURE — 99232 SBSQ HOSP IP/OBS MODERATE 35: CPT | Performed by: PSYCHIATRY & NEUROLOGY

## 2023-01-15 PROCEDURE — 10002803 HB RX 637: Performed by: HOSPITALIST

## 2023-01-15 PROCEDURE — 36415 COLL VENOUS BLD VENIPUNCTURE: CPT | Performed by: PSYCHIATRY & NEUROLOGY

## 2023-01-15 PROCEDURE — 84146 ASSAY OF PROLACTIN: CPT | Performed by: PSYCHIATRY & NEUROLOGY

## 2023-01-15 PROCEDURE — 10004577 HB ROOM CHARGE PSYCH

## 2023-01-15 RX ORDER — LORAZEPAM 2 MG/ML
1 INJECTION INTRAMUSCULAR ONCE
Status: COMPLETED | OUTPATIENT
Start: 2023-01-15 | End: 2023-01-15

## 2023-01-15 RX ADMIN — PRAZOSIN HYDROCHLORIDE 5 MG: 5 CAPSULE ORAL at 21:34

## 2023-01-15 RX ADMIN — GABAPENTIN 600 MG: 300 CAPSULE ORAL at 21:34

## 2023-01-15 RX ADMIN — HYDROXYZINE HYDROCHLORIDE 50 MG: 25 TABLET ORAL at 11:49

## 2023-01-15 RX ADMIN — LAMOTRIGINE 100 MG: 100 TABLET ORAL at 21:34

## 2023-01-15 RX ADMIN — HYDROXYZINE HYDROCHLORIDE 50 MG: 25 TABLET ORAL at 21:34

## 2023-01-15 RX ADMIN — GABAPENTIN 600 MG: 300 CAPSULE ORAL at 09:23

## 2023-01-15 RX ADMIN — TOPIRAMATE 100 MG: 100 TABLET ORAL at 09:23

## 2023-01-15 RX ADMIN — TRAZODONE HYDROCHLORIDE 100 MG: 100 TABLET ORAL at 21:34

## 2023-01-15 RX ADMIN — LORAZEPAM 1 MG: 2 INJECTION INTRAMUSCULAR; INTRAVENOUS at 19:53

## 2023-01-15 RX ADMIN — PROPRANOLOL HYDROCHLORIDE 60 MG: 60 CAPSULE, EXTENDED RELEASE ORAL at 09:23

## 2023-01-15 RX ADMIN — GABAPENTIN 600 MG: 300 CAPSULE ORAL at 17:11

## 2023-01-15 RX ADMIN — GABAPENTIN 600 MG: 300 CAPSULE ORAL at 12:59

## 2023-01-15 RX ADMIN — LAMOTRIGINE 100 MG: 100 TABLET ORAL at 09:23

## 2023-01-15 RX ADMIN — TOPIRAMATE 100 MG: 100 TABLET ORAL at 21:34

## 2023-01-15 RX ADMIN — VORTIOXETINE 20 MG: 10 TABLET, FILM COATED ORAL at 09:23

## 2023-01-15 ASSESSMENT — PAIN SCALES - GENERAL
PAINLEVEL_OUTOF10: 0
PAINLEVEL_OUTOF10: 0

## 2023-01-16 LAB — PROLACTIN SERPL-MCNC: 12.8 NG/ML (ref 2.8–29.2)

## 2023-01-16 PROCEDURE — 13003244 HB ROOM CHARGE INTENSIVE PSYCH

## 2023-01-16 PROCEDURE — 10002803 HB RX 637: Performed by: PSYCHIATRY & NEUROLOGY

## 2023-01-16 PROCEDURE — 99233 SBSQ HOSP IP/OBS HIGH 50: CPT | Performed by: PSYCHIATRY & NEUROLOGY

## 2023-01-16 PROCEDURE — 10002800 HB RX 250 W HCPCS: Performed by: PSYCHIATRY & NEUROLOGY

## 2023-01-16 PROCEDURE — 10002803 HB RX 637: Performed by: HOSPITALIST

## 2023-01-16 RX ORDER — BENZTROPINE MESYLATE 1 MG/1
1 TABLET ORAL EVERY 8 HOURS PRN
Status: DISCONTINUED | OUTPATIENT
Start: 2023-01-16 | End: 2023-01-19 | Stop reason: HOSPADM

## 2023-01-16 RX ORDER — BENZTROPINE MESYLATE 1 MG/ML
1 INJECTION INTRAMUSCULAR; INTRAVENOUS EVERY 8 HOURS PRN
Status: DISCONTINUED | OUTPATIENT
Start: 2023-01-16 | End: 2023-01-19 | Stop reason: HOSPADM

## 2023-01-16 RX ADMIN — HALOPERIDOL LACTATE 5 MG: 5 INJECTION, SOLUTION INTRAMUSCULAR at 15:32

## 2023-01-16 RX ADMIN — PROPRANOLOL HYDROCHLORIDE 60 MG: 60 CAPSULE, EXTENDED RELEASE ORAL at 08:14

## 2023-01-16 RX ADMIN — GABAPENTIN 600 MG: 300 CAPSULE ORAL at 17:43

## 2023-01-16 RX ADMIN — TRAZODONE HYDROCHLORIDE 100 MG: 100 TABLET ORAL at 21:17

## 2023-01-16 RX ADMIN — GABAPENTIN 600 MG: 300 CAPSULE ORAL at 08:07

## 2023-01-16 RX ADMIN — IBUPROFEN 400 MG: 400 TABLET ORAL at 06:08

## 2023-01-16 RX ADMIN — LAMOTRIGINE 100 MG: 100 TABLET ORAL at 21:17

## 2023-01-16 RX ADMIN — HYDROXYZINE HYDROCHLORIDE 50 MG: 25 TABLET ORAL at 21:17

## 2023-01-16 RX ADMIN — GABAPENTIN 600 MG: 300 CAPSULE ORAL at 12:56

## 2023-01-16 RX ADMIN — LAMOTRIGINE 100 MG: 100 TABLET ORAL at 08:08

## 2023-01-16 RX ADMIN — PRAZOSIN HYDROCHLORIDE 5 MG: 5 CAPSULE ORAL at 21:16

## 2023-01-16 RX ADMIN — HYDROXYZINE HYDROCHLORIDE 50 MG: 25 TABLET ORAL at 11:03

## 2023-01-16 RX ADMIN — BENZTROPINE MESYLATE 1 MG: 1 INJECTION INTRAMUSCULAR; INTRAVENOUS at 15:33

## 2023-01-16 RX ADMIN — TOPIRAMATE 100 MG: 100 TABLET ORAL at 21:17

## 2023-01-16 RX ADMIN — GABAPENTIN 600 MG: 300 CAPSULE ORAL at 21:17

## 2023-01-16 RX ADMIN — TOPIRAMATE 100 MG: 100 TABLET ORAL at 08:08

## 2023-01-16 RX ADMIN — VORTIOXETINE 20 MG: 10 TABLET, FILM COATED ORAL at 08:08

## 2023-01-16 ASSESSMENT — PAIN SCALES - GENERAL
PAINLEVEL_OUTOF10: 0

## 2023-01-17 PROCEDURE — 13003244 HB ROOM CHARGE INTENSIVE PSYCH

## 2023-01-17 PROCEDURE — 10002803 HB RX 637: Performed by: PSYCHIATRY & NEUROLOGY

## 2023-01-17 PROCEDURE — 10002803 HB RX 637: Performed by: HOSPITALIST

## 2023-01-17 PROCEDURE — 99233 SBSQ HOSP IP/OBS HIGH 50: CPT | Performed by: PSYCHIATRY & NEUROLOGY

## 2023-01-17 RX ADMIN — PROPRANOLOL HYDROCHLORIDE 60 MG: 60 CAPSULE, EXTENDED RELEASE ORAL at 08:42

## 2023-01-17 RX ADMIN — GABAPENTIN 600 MG: 300 CAPSULE ORAL at 17:36

## 2023-01-17 RX ADMIN — HYDROXYZINE HYDROCHLORIDE 50 MG: 25 TABLET ORAL at 21:20

## 2023-01-17 RX ADMIN — GABAPENTIN 600 MG: 300 CAPSULE ORAL at 13:03

## 2023-01-17 RX ADMIN — LAMOTRIGINE 100 MG: 100 TABLET ORAL at 08:42

## 2023-01-17 RX ADMIN — TOPIRAMATE 100 MG: 100 TABLET ORAL at 21:20

## 2023-01-17 RX ADMIN — TRAZODONE HYDROCHLORIDE 100 MG: 100 TABLET ORAL at 21:20

## 2023-01-17 RX ADMIN — GABAPENTIN 600 MG: 300 CAPSULE ORAL at 21:20

## 2023-01-17 RX ADMIN — GABAPENTIN 600 MG: 300 CAPSULE ORAL at 08:42

## 2023-01-17 RX ADMIN — PRAZOSIN HYDROCHLORIDE 5 MG: 5 CAPSULE ORAL at 21:20

## 2023-01-17 RX ADMIN — TOPIRAMATE 100 MG: 100 TABLET ORAL at 08:42

## 2023-01-17 RX ADMIN — VORTIOXETINE 20 MG: 10 TABLET, FILM COATED ORAL at 08:42

## 2023-01-17 RX ADMIN — LAMOTRIGINE 100 MG: 100 TABLET ORAL at 21:20

## 2023-01-17 ASSESSMENT — PAIN SCALES - GENERAL
PAINLEVEL_OUTOF10: 0

## 2023-01-18 PROCEDURE — 10002803 HB RX 637: Performed by: PSYCHIATRY & NEUROLOGY

## 2023-01-18 PROCEDURE — 99233 SBSQ HOSP IP/OBS HIGH 50: CPT | Performed by: PSYCHIATRY & NEUROLOGY

## 2023-01-18 PROCEDURE — 10004577 HB ROOM CHARGE PSYCH

## 2023-01-18 PROCEDURE — 10002803 HB RX 637: Performed by: HOSPITALIST

## 2023-01-18 RX ADMIN — GABAPENTIN 600 MG: 300 CAPSULE ORAL at 21:41

## 2023-01-18 RX ADMIN — PROPRANOLOL HYDROCHLORIDE 60 MG: 60 CAPSULE, EXTENDED RELEASE ORAL at 08:30

## 2023-01-18 RX ADMIN — HYDROXYZINE HYDROCHLORIDE 50 MG: 25 TABLET ORAL at 21:41

## 2023-01-18 RX ADMIN — LAMOTRIGINE 100 MG: 100 TABLET ORAL at 21:41

## 2023-01-18 RX ADMIN — PRAZOSIN HYDROCHLORIDE 5 MG: 5 CAPSULE ORAL at 21:41

## 2023-01-18 RX ADMIN — GABAPENTIN 600 MG: 300 CAPSULE ORAL at 08:29

## 2023-01-18 RX ADMIN — LAMOTRIGINE 100 MG: 100 TABLET ORAL at 08:30

## 2023-01-18 RX ADMIN — GABAPENTIN 600 MG: 300 CAPSULE ORAL at 12:24

## 2023-01-18 RX ADMIN — TRAZODONE HYDROCHLORIDE 100 MG: 100 TABLET ORAL at 21:41

## 2023-01-18 RX ADMIN — GABAPENTIN 600 MG: 300 CAPSULE ORAL at 17:48

## 2023-01-18 RX ADMIN — VORTIOXETINE 20 MG: 10 TABLET, FILM COATED ORAL at 08:30

## 2023-01-18 RX ADMIN — TOPIRAMATE 100 MG: 100 TABLET ORAL at 08:29

## 2023-01-18 RX ADMIN — TOPIRAMATE 100 MG: 100 TABLET ORAL at 21:41

## 2023-01-18 ASSESSMENT — PAIN SCALES - GENERAL
PAINLEVEL_OUTOF10: 0

## 2023-01-19 VITALS
SYSTOLIC BLOOD PRESSURE: 112 MMHG | TEMPERATURE: 97.5 F | WEIGHT: 244.49 LBS | BODY MASS INDEX: 40.73 KG/M2 | DIASTOLIC BLOOD PRESSURE: 73 MMHG | OXYGEN SATURATION: 96 % | HEIGHT: 65 IN | HEART RATE: 80 BPM | RESPIRATION RATE: 16 BRPM

## 2023-01-19 PROBLEM — R45.851 SUICIDAL IDEATION: Status: RESOLVED | Noted: 2023-01-09 | Resolved: 2023-01-19

## 2023-01-19 PROCEDURE — 99239 HOSP IP/OBS DSCHRG MGMT >30: CPT | Performed by: PSYCHIATRY & NEUROLOGY

## 2023-01-19 PROCEDURE — 10002803 HB RX 637: Performed by: PSYCHIATRY & NEUROLOGY

## 2023-01-19 PROCEDURE — 10002803 HB RX 637: Performed by: HOSPITALIST

## 2023-01-19 RX ORDER — TRAZODONE HYDROCHLORIDE 100 MG/1
100 TABLET ORAL NIGHTLY PRN
Qty: 7 TABLET | Refills: 0 | Status: SHIPPED | OUTPATIENT
Start: 2023-01-19 | End: 2023-01-25 | Stop reason: SDUPTHER

## 2023-01-19 RX ORDER — HYDROXYZINE 50 MG/1
50 TABLET, FILM COATED ORAL 2 TIMES DAILY PRN
Qty: 15 TABLET | Refills: 0 | Status: SHIPPED | OUTPATIENT
Start: 2023-01-19 | End: 2023-01-25 | Stop reason: SDUPTHER

## 2023-01-19 RX ORDER — VORTIOXETINE 20 MG/1
TABLET, FILM COATED ORAL
Qty: 30 TABLET | Refills: 0 | Status: SHIPPED | OUTPATIENT
Start: 2023-01-19 | End: 2023-01-19

## 2023-01-19 RX ADMIN — VORTIOXETINE 20 MG: 10 TABLET, FILM COATED ORAL at 08:36

## 2023-01-19 RX ADMIN — PROPRANOLOL HYDROCHLORIDE 60 MG: 60 CAPSULE, EXTENDED RELEASE ORAL at 08:35

## 2023-01-19 RX ADMIN — IBUPROFEN 400 MG: 400 TABLET ORAL at 08:36

## 2023-01-19 RX ADMIN — GABAPENTIN 600 MG: 300 CAPSULE ORAL at 08:34

## 2023-01-19 RX ADMIN — GABAPENTIN 600 MG: 300 CAPSULE ORAL at 12:37

## 2023-01-19 RX ADMIN — LAMOTRIGINE 100 MG: 100 TABLET ORAL at 08:35

## 2023-01-19 RX ADMIN — TOPIRAMATE 100 MG: 100 TABLET ORAL at 08:35

## 2023-01-19 ASSESSMENT — PAIN SCALES - GENERAL
PAINLEVEL_OUTOF10: 5
PAINLEVEL_OUTOF10: 3

## 2023-01-20 RX ORDER — VORTIOXETINE 20 MG/1
TABLET, FILM COATED ORAL
Qty: 30 TABLET | Refills: 0 | OUTPATIENT
Start: 2023-01-20

## 2023-01-22 ENCOUNTER — E-ADVICE (OUTPATIENT)
Dept: BEHAVIORAL HEALTH | Age: 38
End: 2023-01-22

## 2023-01-23 ENCOUNTER — HOSPITAL ENCOUNTER (EMERGENCY)
Age: 38
Discharge: HOME | End: 2023-01-23
Attending: EMERGENCY MEDICINE

## 2023-01-23 ENCOUNTER — NURSE TRIAGE (OUTPATIENT)
Dept: TELEHEALTH | Age: 38
End: 2023-01-23

## 2023-01-23 ENCOUNTER — APPOINTMENT (OUTPATIENT)
Dept: GENERAL RADIOLOGY | Age: 38
End: 2023-01-23
Attending: EMERGENCY MEDICINE

## 2023-01-23 VITALS
HEART RATE: 78 BPM | DIASTOLIC BLOOD PRESSURE: 74 MMHG | OXYGEN SATURATION: 99 % | RESPIRATION RATE: 18 BRPM | WEIGHT: 244.27 LBS | BODY MASS INDEX: 40.7 KG/M2 | SYSTOLIC BLOOD PRESSURE: 100 MMHG | TEMPERATURE: 98.8 F | HEIGHT: 65 IN

## 2023-01-23 DIAGNOSIS — R07.9 CHEST PAIN, UNSPECIFIED TYPE: Primary | ICD-10-CM

## 2023-01-23 LAB
ALBUMIN SERPL-MCNC: 3.4 G/DL (ref 3.6–5.1)
ALBUMIN/GLOB SERPL: 0.9 {RATIO} (ref 1–2.4)
ALP SERPL-CCNC: 49 UNITS/L (ref 45–117)
ALT SERPL-CCNC: 21 UNITS/L
ANION GAP SERPL CALC-SCNC: 11 MMOL/L (ref 7–19)
AST SERPL-CCNC: 27 UNITS/L
ATRIAL RATE (BPM): 78
BASOPHILS # BLD: 0.1 K/MCL (ref 0–0.3)
BASOPHILS NFR BLD: 1 %
BILIRUB SERPL-MCNC: 0.3 MG/DL (ref 0.2–1)
BUN SERPL-MCNC: 7 MG/DL (ref 6–20)
BUN/CREAT SERPL: 9 (ref 7–25)
CALCIUM SERPL-MCNC: 8.9 MG/DL (ref 8.4–10.2)
CHLORIDE SERPL-SCNC: 113 MMOL/L (ref 97–110)
CO2 SERPL-SCNC: 22 MMOL/L (ref 21–32)
CREAT SERPL-MCNC: 0.77 MG/DL (ref 0.51–0.95)
D DIMER PPP FEU-MCNC: 0.29 MG/L (FEU)
DEPRECATED RDW RBC: 37.7 FL (ref 39–50)
EOSINOPHIL # BLD: 0.5 K/MCL (ref 0–0.5)
EOSINOPHIL NFR BLD: 8 %
ERYTHROCYTE [DISTWIDTH] IN BLOOD: 16.9 % (ref 11–15)
FASTING DURATION TIME PATIENT: ABNORMAL H
GFR SERPLBLD BASED ON 1.73 SQ M-ARVRAT: >90 ML/MIN
GLOBULIN SER-MCNC: 4 G/DL (ref 2–4)
GLUCOSE SERPL-MCNC: 101 MG/DL (ref 70–99)
HCG SERPL QL: NEGATIVE
HCT VFR BLD CALC: 35.9 % (ref 36–46.5)
HGB BLD-MCNC: 10.8 G/DL (ref 12–15.5)
IMM GRANULOCYTES # BLD AUTO: 0 K/MCL (ref 0–0.2)
IMM GRANULOCYTES # BLD: 0 %
LYMPHOCYTES # BLD: 2.3 K/MCL (ref 1–4.8)
LYMPHOCYTES NFR BLD: 36 %
MCH RBC QN AUTO: 19.5 PG (ref 26–34)
MCHC RBC AUTO-ENTMCNC: 30.1 G/DL (ref 32–36.5)
MCV RBC AUTO: 64.7 FL (ref 78–100)
MONOCYTES # BLD: 0.4 K/MCL (ref 0.3–0.9)
MONOCYTES NFR BLD: 6 %
NEUTROPHILS # BLD: 3 K/MCL (ref 1.8–7.7)
NEUTROPHILS NFR BLD: 49 %
NRBC BLD MANUAL-RTO: 0 /100 WBC
P AXIS (DEGREES): 29
PLATELET # BLD AUTO: 420 K/MCL (ref 140–450)
POTASSIUM SERPL-SCNC: 5.1 MMOL/L (ref 3.4–5.1)
PR-INTERVAL (MSEC): 126
PROT SERPL-MCNC: 7.4 G/DL (ref 6.4–8.2)
QRS-INTERVAL (MSEC): 84
QT-INTERVAL (MSEC): 430
QTC: 490
R AXIS (DEGREES): 16
RAINBOW EXTRA TUBES HOLD SPECIMEN: NORMAL
RAINBOW EXTRA TUBES HOLD SPECIMEN: NORMAL
RBC # BLD: 5.55 MIL/MCL (ref 4–5.2)
REPORT TEXT: NORMAL
SODIUM SERPL-SCNC: 141 MMOL/L (ref 135–145)
T AXIS (DEGREES): 41
TROPONIN I SERPL DL<=0.01 NG/ML-MCNC: <4 NG/L
VENTRICULAR RATE EKG/MIN (BPM): 78
WBC # BLD: 6.2 K/MCL (ref 4.2–11)

## 2023-01-23 PROCEDURE — 84484 ASSAY OF TROPONIN QUANT: CPT | Performed by: EMERGENCY MEDICINE

## 2023-01-23 PROCEDURE — 80053 COMPREHEN METABOLIC PANEL: CPT | Performed by: EMERGENCY MEDICINE

## 2023-01-23 PROCEDURE — 10002800 HB RX 250 W HCPCS: Performed by: EMERGENCY MEDICINE

## 2023-01-23 PROCEDURE — 96372 THER/PROPH/DIAG INJ SC/IM: CPT

## 2023-01-23 PROCEDURE — 96374 THER/PROPH/DIAG INJ IV PUSH: CPT

## 2023-01-23 PROCEDURE — 85379 FIBRIN DEGRADATION QUANT: CPT | Performed by: EMERGENCY MEDICINE

## 2023-01-23 PROCEDURE — 84703 CHORIONIC GONADOTROPIN ASSAY: CPT | Performed by: EMERGENCY MEDICINE

## 2023-01-23 PROCEDURE — 99285 EMERGENCY DEPT VISIT HI MDM: CPT

## 2023-01-23 PROCEDURE — 36415 COLL VENOUS BLD VENIPUNCTURE: CPT | Performed by: EMERGENCY MEDICINE

## 2023-01-23 PROCEDURE — 93005 ELECTROCARDIOGRAM TRACING: CPT | Performed by: EMERGENCY MEDICINE

## 2023-01-23 PROCEDURE — 85025 COMPLETE CBC W/AUTO DIFF WBC: CPT | Performed by: EMERGENCY MEDICINE

## 2023-01-23 PROCEDURE — 96375 TX/PRO/DX INJ NEW DRUG ADDON: CPT

## 2023-01-23 PROCEDURE — 71045 X-RAY EXAM CHEST 1 VIEW: CPT

## 2023-01-23 RX ORDER — KETOROLAC TROMETHAMINE 30 MG/ML
15 INJECTION, SOLUTION INTRAMUSCULAR; INTRAVENOUS ONCE
Status: COMPLETED | OUTPATIENT
Start: 2023-01-23 | End: 2023-01-23

## 2023-01-23 RX ORDER — DIPHENHYDRAMINE HYDROCHLORIDE 50 MG/ML
25 INJECTION INTRAMUSCULAR; INTRAVENOUS ONCE
Status: COMPLETED | OUTPATIENT
Start: 2023-01-23 | End: 2023-01-23

## 2023-01-23 RX ORDER — PROCHLORPERAZINE EDISYLATE 5 MG/ML
5 INJECTION INTRAMUSCULAR; INTRAVENOUS ONCE
Status: COMPLETED | OUTPATIENT
Start: 2023-01-23 | End: 2023-01-23

## 2023-01-23 RX ADMIN — PROCHLORPERAZINE EDISYLATE 5 MG: 5 INJECTION INTRAMUSCULAR; INTRAVENOUS at 06:35

## 2023-01-23 RX ADMIN — KETOROLAC TROMETHAMINE 15 MG: 30 INJECTION, SOLUTION INTRAMUSCULAR; INTRAVENOUS at 06:32

## 2023-01-23 RX ADMIN — DIPHENHYDRAMINE HYDROCHLORIDE 25 MG: 50 INJECTION, SOLUTION INTRAMUSCULAR; INTRAVENOUS at 06:34

## 2023-01-23 ASSESSMENT — HEART SCORE
EKG: NORMAL
HEART SCORE: 0
HISTORY: SLIGHTLY SUSPICIOUS
TROPONIN: EQUAL OR LESS THAN NORMAL LIMIT
RISK FACTORS: NO RISK FACTORS KNOWN
AGE: LESS THAN OR EQUAL TO 45

## 2023-01-24 ENCOUNTER — TELEPHONE (OUTPATIENT)
Dept: BEHAVIORAL HEALTH | Age: 38
End: 2023-01-24

## 2023-01-24 ENCOUNTER — TELEPHONE (OUTPATIENT)
Dept: NEUROLOGY | Age: 38
End: 2023-01-24

## 2023-01-25 ENCOUNTER — BEHAVIORAL HEALTH (OUTPATIENT)
Dept: BEHAVIORAL HEALTH | Age: 38
End: 2023-01-25

## 2023-01-25 DIAGNOSIS — F44.5 PSYCHOGENIC NONEPILEPTIC SEIZURE: ICD-10-CM

## 2023-01-25 DIAGNOSIS — F41.9 ANXIETY: ICD-10-CM

## 2023-01-25 DIAGNOSIS — F31.32 BIPOLAR AFFECTIVE DISORDER, CURRENTLY DEPRESSED, MODERATE (CMD): Primary | ICD-10-CM

## 2023-01-25 DIAGNOSIS — F43.10 PTSD (POST-TRAUMATIC STRESS DISORDER): ICD-10-CM

## 2023-01-25 DIAGNOSIS — F51.01 PRIMARY INSOMNIA: ICD-10-CM

## 2023-01-25 PROCEDURE — 99214 OFFICE O/P EST MOD 30 MIN: CPT | Performed by: PSYCHIATRY & NEUROLOGY

## 2023-01-25 RX ORDER — LAMOTRIGINE 150 MG/1
150 TABLET ORAL 2 TIMES DAILY
Qty: 60 TABLET | Refills: 0 | Status: SHIPPED | OUTPATIENT
Start: 2023-01-25 | End: 2023-02-27

## 2023-01-25 RX ORDER — TRAZODONE HYDROCHLORIDE 100 MG/1
100 TABLET ORAL NIGHTLY PRN
Qty: 30 TABLET | Refills: 0 | Status: SHIPPED | OUTPATIENT
Start: 2023-01-25 | End: 2023-02-27

## 2023-01-25 RX ORDER — HYDROXYZINE 50 MG/1
50 TABLET, FILM COATED ORAL 2 TIMES DAILY PRN
Qty: 60 TABLET | Refills: 0 | Status: SHIPPED | OUTPATIENT
Start: 2023-01-25 | End: 2023-02-23

## 2023-01-25 RX ORDER — PRAZOSIN HYDROCHLORIDE 2 MG/1
8 CAPSULE ORAL NIGHTLY
Qty: 120 CAPSULE | Refills: 0 | Status: SHIPPED | OUTPATIENT
Start: 2023-01-25 | End: 2023-02-21 | Stop reason: SDUPTHER

## 2023-01-26 ENCOUNTER — TELEPHONE (OUTPATIENT)
Dept: BEHAVIORAL HEALTH | Age: 38
End: 2023-01-26

## 2023-01-30 ENCOUNTER — E-ADVICE (OUTPATIENT)
Dept: NEUROLOGY | Age: 38
End: 2023-01-30

## 2023-02-02 ENCOUNTER — TELEPHONE (OUTPATIENT)
Dept: NEUROLOGY | Age: 38
End: 2023-02-02

## 2023-02-03 ENCOUNTER — TELEPHONE (OUTPATIENT)
Dept: BEHAVIORAL HEALTH | Age: 38
End: 2023-02-03

## 2023-02-03 ENCOUNTER — APPOINTMENT (OUTPATIENT)
Dept: GENERAL RADIOLOGY | Age: 38
End: 2023-02-03
Attending: EMERGENCY MEDICINE

## 2023-02-03 ENCOUNTER — TELEPHONE (OUTPATIENT)
Dept: NEUROLOGY | Age: 38
End: 2023-02-03

## 2023-02-03 ENCOUNTER — HOSPITAL ENCOUNTER (OUTPATIENT)
Age: 38
Setting detail: OBSERVATION
Discharge: HOME | End: 2023-02-04
Attending: EMERGENCY MEDICINE | Admitting: INTERNAL MEDICINE

## 2023-02-03 ENCOUNTER — APPOINTMENT (OUTPATIENT)
Dept: CT IMAGING | Age: 38
End: 2023-02-03
Attending: EMERGENCY MEDICINE

## 2023-02-03 DIAGNOSIS — R55 RECURRENT SYNCOPE: Primary | ICD-10-CM

## 2023-02-03 DIAGNOSIS — F07.81 POST CONCUSSION SYNDROME: ICD-10-CM

## 2023-02-03 DIAGNOSIS — S06.9X9A CLOSED HEAD INJURY WITH LOSS OF CONSCIOUSNESS OF UNKNOWN DURATION (CMD): ICD-10-CM

## 2023-02-03 LAB
ALBUMIN SERPL-MCNC: 3.4 G/DL (ref 3.6–5.1)
ALBUMIN/GLOB SERPL: 1 {RATIO} (ref 1–2.4)
ALP SERPL-CCNC: 51 UNITS/L (ref 45–117)
ALT SERPL-CCNC: 14 UNITS/L
ANION GAP SERPL CALC-SCNC: 6 MMOL/L (ref 7–19)
AST SERPL-CCNC: 9 UNITS/L
BASOPHILS # BLD: 0.1 K/MCL (ref 0–0.3)
BASOPHILS NFR BLD: 1 %
BILIRUB SERPL-MCNC: 0.4 MG/DL (ref 0.2–1)
BUN SERPL-MCNC: 6 MG/DL (ref 6–20)
BUN/CREAT SERPL: 7 (ref 7–25)
CALCIUM SERPL-MCNC: 8.5 MG/DL (ref 8.4–10.2)
CHLORIDE SERPL-SCNC: 109 MMOL/L (ref 97–110)
CO2 SERPL-SCNC: 26 MMOL/L (ref 21–32)
CREAT SERPL-MCNC: 0.88 MG/DL (ref 0.51–0.95)
DEPRECATED RDW RBC: 36.5 FL (ref 39–50)
EOSINOPHIL # BLD: 0.3 K/MCL (ref 0–0.5)
EOSINOPHIL NFR BLD: 5 %
ERYTHROCYTE [DISTWIDTH] IN BLOOD: 16.4 % (ref 11–15)
FASTING DURATION TIME PATIENT: ABNORMAL H
GFR SERPLBLD BASED ON 1.73 SQ M-ARVRAT: 87 ML/MIN
GLOBULIN SER-MCNC: 3.4 G/DL (ref 2–4)
GLUCOSE SERPL-MCNC: 93 MG/DL (ref 70–99)
HCG UR QL: NEGATIVE
HCT VFR BLD CALC: 32.6 % (ref 36–46.5)
HGB BLD-MCNC: 10 G/DL (ref 12–15.5)
IMM GRANULOCYTES # BLD AUTO: 0 K/MCL (ref 0–0.2)
IMM GRANULOCYTES # BLD: 0 %
LYMPHOCYTES # BLD: 1.6 K/MCL (ref 1–4.8)
LYMPHOCYTES NFR BLD: 24 %
MCH RBC QN AUTO: 19.4 PG (ref 26–34)
MCHC RBC AUTO-ENTMCNC: 30.7 G/DL (ref 32–36.5)
MCV RBC AUTO: 63.3 FL (ref 78–100)
MONOCYTES # BLD: 0.2 K/MCL (ref 0.3–0.9)
MONOCYTES NFR BLD: 3 %
NEUTROPHILS # BLD: 4.4 K/MCL (ref 1.8–7.7)
NEUTROPHILS NFR BLD: 67 %
NRBC BLD MANUAL-RTO: 0 /100 WBC
PLATELET # BLD AUTO: 382 K/MCL (ref 140–450)
POTASSIUM SERPL-SCNC: 3.4 MMOL/L (ref 3.4–5.1)
PROT SERPL-MCNC: 6.8 G/DL (ref 6.4–8.2)
RBC # BLD: 5.15 MIL/MCL (ref 4–5.2)
SARS-COV-2 RNA RESP QL NAA+PROBE: NOT DETECTED
SERVICE CMNT-IMP: NORMAL
SERVICE CMNT-IMP: NORMAL
SODIUM SERPL-SCNC: 138 MMOL/L (ref 135–145)
TROPONIN I SERPL DL<=0.01 NG/ML-MCNC: <4 NG/L
WBC # BLD: 6.6 K/MCL (ref 4.2–11)

## 2023-02-03 PROCEDURE — G1004 CDSM NDSC: HCPCS

## 2023-02-03 PROCEDURE — 85025 COMPLETE CBC W/AUTO DIFF WBC: CPT | Performed by: EMERGENCY MEDICINE

## 2023-02-03 PROCEDURE — 71046 X-RAY EXAM CHEST 2 VIEWS: CPT

## 2023-02-03 PROCEDURE — 10002807 HB RX 258: Performed by: INTERNAL MEDICINE

## 2023-02-03 PROCEDURE — 84703 CHORIONIC GONADOTROPIN ASSAY: CPT

## 2023-02-03 PROCEDURE — 10002803 HB RX 637: Performed by: INTERNAL MEDICINE

## 2023-02-03 PROCEDURE — 80053 COMPREHEN METABOLIC PANEL: CPT | Performed by: EMERGENCY MEDICINE

## 2023-02-03 PROCEDURE — 84484 ASSAY OF TROPONIN QUANT: CPT | Performed by: EMERGENCY MEDICINE

## 2023-02-03 PROCEDURE — 96374 THER/PROPH/DIAG INJ IV PUSH: CPT

## 2023-02-03 PROCEDURE — 36415 COLL VENOUS BLD VENIPUNCTURE: CPT

## 2023-02-03 PROCEDURE — G0378 HOSPITAL OBSERVATION PER HR: HCPCS

## 2023-02-03 PROCEDURE — 70450 CT HEAD/BRAIN W/O DYE: CPT

## 2023-02-03 PROCEDURE — 10004651 HB RX, NO CHARGE ITEM: Performed by: INTERNAL MEDICINE

## 2023-02-03 PROCEDURE — 96372 THER/PROPH/DIAG INJ SC/IM: CPT

## 2023-02-03 PROCEDURE — 10004651 HB RX, NO CHARGE ITEM: Performed by: EMERGENCY MEDICINE

## 2023-02-03 PROCEDURE — 10002800 HB RX 250 W HCPCS: Performed by: INTERNAL MEDICINE

## 2023-02-03 PROCEDURE — 93005 ELECTROCARDIOGRAM TRACING: CPT | Performed by: EMERGENCY MEDICINE

## 2023-02-03 PROCEDURE — 99285 EMERGENCY DEPT VISIT HI MDM: CPT

## 2023-02-03 PROCEDURE — 96361 HYDRATE IV INFUSION ADD-ON: CPT

## 2023-02-03 PROCEDURE — 72125 CT NECK SPINE W/O DYE: CPT

## 2023-02-03 PROCEDURE — 10002800 HB RX 250 W HCPCS: Performed by: EMERGENCY MEDICINE

## 2023-02-03 PROCEDURE — 87635 SARS-COV-2 COVID-19 AMP PRB: CPT | Performed by: EMERGENCY MEDICINE

## 2023-02-03 RX ORDER — ENOXAPARIN SODIUM 100 MG/ML
40 INJECTION SUBCUTANEOUS 2 TIMES DAILY
Status: DISCONTINUED | OUTPATIENT
Start: 2023-02-03 | End: 2023-02-04 | Stop reason: HOSPADM

## 2023-02-03 RX ORDER — BUTALBITAL, ACETAMINOPHEN AND CAFFEINE 50; 325; 40 MG/1; MG/1; MG/1
1 TABLET ORAL EVERY 6 HOURS PRN
Status: ON HOLD | COMMUNITY
End: 2023-02-04 | Stop reason: SDUPTHER

## 2023-02-03 RX ORDER — ACETAMINOPHEN 325 MG/1
650 TABLET ORAL EVERY 4 HOURS PRN
Status: DISCONTINUED | OUTPATIENT
Start: 2023-02-03 | End: 2023-02-04 | Stop reason: HOSPADM

## 2023-02-03 RX ORDER — GABAPENTIN 300 MG/1
600 CAPSULE ORAL 4 TIMES DAILY
Status: DISCONTINUED | OUTPATIENT
Start: 2023-02-03 | End: 2023-02-04 | Stop reason: HOSPADM

## 2023-02-03 RX ORDER — PROPRANOLOL HCL 60 MG
60 CAPSULE, EXTENDED RELEASE 24HR ORAL DAILY
Status: DISCONTINUED | OUTPATIENT
Start: 2023-02-04 | End: 2023-02-04 | Stop reason: HOSPADM

## 2023-02-03 RX ORDER — BUTALBITAL, ACETAMINOPHEN AND CAFFEINE 50; 325; 40 MG/1; MG/1; MG/1
1 TABLET ORAL EVERY 6 HOURS PRN
Status: DISCONTINUED | OUTPATIENT
Start: 2023-02-03 | End: 2023-02-04 | Stop reason: HOSPADM

## 2023-02-03 RX ORDER — TRAZODONE HYDROCHLORIDE 100 MG/1
100 TABLET ORAL NIGHTLY PRN
Status: DISCONTINUED | OUTPATIENT
Start: 2023-02-03 | End: 2023-02-04 | Stop reason: HOSPADM

## 2023-02-03 RX ORDER — ACETAMINOPHEN 500 MG
1000 TABLET ORAL ONCE
Status: COMPLETED | OUTPATIENT
Start: 2023-02-03 | End: 2023-02-03

## 2023-02-03 RX ORDER — TOPIRAMATE 100 MG/1
100 TABLET, FILM COATED ORAL 2 TIMES DAILY
Status: DISCONTINUED | OUTPATIENT
Start: 2023-02-03 | End: 2023-02-04 | Stop reason: HOSPADM

## 2023-02-03 RX ORDER — SODIUM CHLORIDE 9 MG/ML
INJECTION, SOLUTION INTRAVENOUS CONTINUOUS
Status: ACTIVE | OUTPATIENT
Start: 2023-02-03 | End: 2023-02-04

## 2023-02-03 RX ORDER — POLYETHYLENE GLYCOL 3350 17 G/17G
17 POWDER, FOR SOLUTION ORAL DAILY PRN
Status: DISCONTINUED | OUTPATIENT
Start: 2023-02-03 | End: 2023-02-04 | Stop reason: HOSPADM

## 2023-02-03 RX ORDER — CETIRIZINE HYDROCHLORIDE 10 MG/1
10 TABLET ORAL DAILY PRN
Status: DISCONTINUED | OUTPATIENT
Start: 2023-02-03 | End: 2023-02-04 | Stop reason: HOSPADM

## 2023-02-03 RX ORDER — ONDANSETRON 2 MG/ML
4 INJECTION INTRAMUSCULAR; INTRAVENOUS 2 TIMES DAILY PRN
Status: DISCONTINUED | OUTPATIENT
Start: 2023-02-03 | End: 2023-02-04 | Stop reason: HOSPADM

## 2023-02-03 RX ORDER — ALBUTEROL SULFATE 90 UG/1
2 AEROSOL, METERED RESPIRATORY (INHALATION) EVERY 4 HOURS PRN
Status: DISCONTINUED | OUTPATIENT
Start: 2023-02-03 | End: 2023-02-04 | Stop reason: SDUPTHER

## 2023-02-03 RX ORDER — ONDANSETRON 2 MG/ML
4 INJECTION INTRAMUSCULAR; INTRAVENOUS ONCE
Status: COMPLETED | OUTPATIENT
Start: 2023-02-03 | End: 2023-02-03

## 2023-02-03 RX ORDER — POTASSIUM CHLORIDE 20 MEQ/1
40 TABLET, EXTENDED RELEASE ORAL ONCE
Status: COMPLETED | OUTPATIENT
Start: 2023-02-03 | End: 2023-02-03

## 2023-02-03 RX ORDER — 0.9 % SODIUM CHLORIDE 0.9 %
2 VIAL (ML) INJECTION EVERY 12 HOURS SCHEDULED
Status: DISCONTINUED | OUTPATIENT
Start: 2023-02-03 | End: 2023-02-04 | Stop reason: HOSPADM

## 2023-02-03 RX ORDER — HYDROXYZINE HYDROCHLORIDE 25 MG/1
50 TABLET, FILM COATED ORAL 2 TIMES DAILY PRN
Status: DISCONTINUED | OUTPATIENT
Start: 2023-02-03 | End: 2023-02-04 | Stop reason: HOSPADM

## 2023-02-03 RX ORDER — SUMATRIPTAN 50 MG/1
100 TABLET, FILM COATED ORAL DAILY PRN
Status: DISCONTINUED | OUTPATIENT
Start: 2023-02-03 | End: 2023-02-04 | Stop reason: HOSPADM

## 2023-02-03 RX ORDER — FLUTICASONE PROPIONATE 50 MCG
1 SPRAY, SUSPENSION (ML) NASAL DAILY PRN
Status: DISCONTINUED | OUTPATIENT
Start: 2023-02-03 | End: 2023-02-04 | Stop reason: HOSPADM

## 2023-02-03 RX ADMIN — ONDANSETRON 4 MG: 2 INJECTION INTRAMUSCULAR; INTRAVENOUS at 19:05

## 2023-02-03 RX ADMIN — BUTALBITAL, ACETAMINOPHEN, AND CAFFEINE 1 TABLET: 325; 50; 40 TABLET ORAL at 23:14

## 2023-02-03 RX ADMIN — PRAZOSIN HYDROCHLORIDE 8 MG: 1 CAPSULE ORAL at 22:05

## 2023-02-03 RX ADMIN — SODIUM CHLORIDE, PRESERVATIVE FREE 2 ML: 5 INJECTION INTRAVENOUS at 22:06

## 2023-02-03 RX ADMIN — POTASSIUM CHLORIDE 40 MEQ: 1500 TABLET, EXTENDED RELEASE ORAL at 22:04

## 2023-02-03 RX ADMIN — LAMOTRIGINE 150 MG: 100 TABLET ORAL at 22:04

## 2023-02-03 RX ADMIN — GABAPENTIN 600 MG: 300 CAPSULE ORAL at 22:04

## 2023-02-03 RX ADMIN — SODIUM CHLORIDE: 9 INJECTION, SOLUTION INTRAVENOUS at 22:04

## 2023-02-03 RX ADMIN — TOPIRAMATE 100 MG: 100 TABLET ORAL at 22:04

## 2023-02-03 RX ADMIN — ENOXAPARIN SODIUM 40 MG: 100 INJECTION SUBCUTANEOUS at 22:07

## 2023-02-03 RX ADMIN — ACETAMINOPHEN 1000 MG: 500 TABLET ORAL at 19:05

## 2023-02-03 SDOH — ECONOMIC STABILITY: GENERAL
IN THE PAST YEAR, HAVE YOU OR ANY FAMILY MEMBERS YOU LIVE WITH BEEN UNABLE TO GET ANY OF THE FOLLOWING WHEN IT WAS REALLY NEEDED? CHECK ALL THAT APPLY.: CLOTHING

## 2023-02-03 SDOH — SOCIAL STABILITY: SOCIAL NETWORK: SUPPORT SYSTEMS: SPOUSE

## 2023-02-03 SDOH — HEALTH STABILITY: GENERAL: BECAUSE OF A PHYSICAL, MENTAL, OR EMOTIONAL CONDITION, DO YOU HAVE DIFFICULTY DOING ERRANDS ALONE?: NO

## 2023-02-03 SDOH — HEALTH STABILITY: PHYSICAL HEALTH: DO YOU HAVE SERIOUS DIFFICULTY WALKING OR CLIMBING STAIRS?: NO

## 2023-02-03 SDOH — ECONOMIC STABILITY: HOUSING INSECURITY: WHAT IS YOUR LIVING SITUATION TODAY?: APARTMENT

## 2023-02-03 SDOH — SOCIAL STABILITY: SOCIAL NETWORK
HOW OFTEN DO YOU SEE OR TALK TO PEOPLE THAT YOU CARE ABOUT AND FEEL CLOSE TO? (FOR EXAMPLE: TALKING TO FRIENDS ON THE PHONE, VISITING FRIENDS OR FAMILY, GOING TO CHURCH OR CLUB MEETINGS): 5 OR MORE TIMES A WEEK

## 2023-02-03 SDOH — HEALTH STABILITY: PHYSICAL HEALTH: DO YOU HAVE DIFFICULTY DRESSING OR BATHING?: NO

## 2023-02-03 SDOH — ECONOMIC STABILITY: HOUSING INSECURITY: WHAT IS YOUR LIVING SITUATION TODAY?: SPOUSE

## 2023-02-03 SDOH — ECONOMIC STABILITY: HOUSING INSECURITY: ARE YOU WORRIED ABOUT LOSING YOUR HOUSING?: NO

## 2023-02-03 SDOH — ECONOMIC STABILITY: FOOD INSECURITY: HOW OFTEN IN THE PAST 12 MONTHS WERE YOU WORRIED OR STRESSED ABOUT HAVING ENOUGH MONEY TO BUY NUTRITIOUS MEALS?: ALWAYS

## 2023-02-03 ASSESSMENT — COLUMBIA-SUICIDE SEVERITY RATING SCALE - C-SSRS
2. HAVE YOU ACTUALLY HAD ANY THOUGHTS OF KILLING YOURSELF?: NO
6. HAVE YOU EVER DONE ANYTHING, STARTED TO DO ANYTHING, OR PREPARED TO DO ANYTHING TO END YOUR LIFE?: NO
1. WITHIN THE PAST MONTH, HAVE YOU WISHED YOU WERE DEAD OR WISHED YOU COULD GO TO SLEEP AND NOT WAKE UP?: NO
IS THE PATIENT ABLE TO COMPLETE C-SSRS: YES

## 2023-02-03 ASSESSMENT — ACTIVITIES OF DAILY LIVING (ADL)
ADL_SHORT_OF_BREATH: NO
ADL_SCORE: 12
RECENT_DECLINE_ADL: NO
ADL_BEFORE_ADMISSION: INDEPENDENT

## 2023-02-03 ASSESSMENT — ENCOUNTER SYMPTOMS
WEAKNESS: 0
SHORTNESS OF BREATH: 0
APPETITE CHANGE: 1
BRUISES/BLEEDS EASILY: 0
FEVER: 0
COUGH: 0
DIZZINESS: 1
HEADACHES: 1
NAUSEA: 1
ABDOMINAL PAIN: 0
CHILLS: 0
CONFUSION: 1

## 2023-02-03 ASSESSMENT — PATIENT HEALTH QUESTIONNAIRE - PHQ9
CLINICAL INTERPRETATION OF PHQ2 SCORE: FURTHER SCREENING NEEDED
7. TROUBLE CONCENTRATING ON THINGS, SUCH AS READING THE NEWSPAPER OR WATCHING TELEVISION: SEVERAL DAYS
1. LITTLE INTEREST OR PLEASURE IN DOING THINGS: MORE THAN HALF THE DAYS
CLINICAL INTERPRETATION OF PHQ9 SCORE: MODERATE DEPRESSION
IS PATIENT ABLE TO COMPLETE PHQ2 OR PHQ9: YES
8. MOVING OR SPEAKING SO SLOWLY THAT OTHER PEOPLE COULD HAVE NOTICED. OR THE OPPOSITE, BEING SO FIGETY OR RESTLESS THAT YOU HAVE BEEN MOVING AROUND A LOT MORE THAN USUAL: SEVERAL DAYS
SUM OF ALL RESPONSES TO PHQ9 QUESTIONS 1 AND 2: 3
5. POOR APPETITE OR OVEREATING: MORE THAN HALF THE DAYS
2. FEELING DOWN, DEPRESSED OR HOPELESS: SEVERAL DAYS
3. TROUBLE FALLING OR STAYING ASLEEP OR SLEEPING TOO MUCH: MORE THAN HALF THE DAYS
SUM OF ALL RESPONSES TO PHQ9 QUESTIONS 1 AND 2: 3
4. FEELING TIRED OR HAVING LITTLE ENERGY: MORE THAN HALF THE DAYS
10. IF YOU CHECKED OFF ANY PROBLEMS, HOW DIFFICULT HAVE THESE PROBLEMS MADE IT FOR YOU TO DO YOUR WORK, TAKE CARE OF THINGS AT HOME, OR GET ALONG WITH OTHER PEOPLE: SOMEWHAT DIFFICULT
9. THOUGHTS THAT YOU WOULD BE BETTER OFF DEAD, OR OF HURTING YOURSELF: NOT AT ALL
SUM OF ALL RESPONSES TO PHQ QUESTIONS 1-9: 12
6. FEELING BAD ABOUT YOURSELF - OR THAT YOU ARE A FAILURE OR HAVE LET YOURSELF OR YOUR FAMILY DOWN: SEVERAL DAYS

## 2023-02-03 ASSESSMENT — LIFESTYLE VARIABLES
HOW OFTEN DO YOU HAVE 6 OR MORE DRINKS ON ONE OCCASION: NEVER
HOW OFTEN DO YOU HAVE A DRINK CONTAINING ALCOHOL: NEVER
ALCOHOL_USE_STATUS: NO OR LOW RISK WITH VALIDATED TOOL

## 2023-02-03 ASSESSMENT — PAIN SCALES - GENERAL
PAINLEVEL_OUTOF10: 0
PAINLEVEL_OUTOF10: 8
PAINLEVEL_OUTOF10: 10

## 2023-02-03 ASSESSMENT — ABNORMAL INVOLUNTARY MOVEMENTS - GENERAL NEURO: AIM_HEAD_NECK: TREMOR

## 2023-02-04 ENCOUNTER — APPOINTMENT (OUTPATIENT)
Dept: CARDIOLOGY | Age: 38
End: 2023-02-04
Attending: INTERNAL MEDICINE

## 2023-02-04 ENCOUNTER — APPOINTMENT (OUTPATIENT)
Dept: MRI IMAGING | Age: 38
End: 2023-02-04
Attending: PSYCHIATRY & NEUROLOGY

## 2023-02-04 VITALS
TEMPERATURE: 97.7 F | SYSTOLIC BLOOD PRESSURE: 117 MMHG | DIASTOLIC BLOOD PRESSURE: 73 MMHG | OXYGEN SATURATION: 97 % | HEIGHT: 65 IN | HEART RATE: 70 BPM | BODY MASS INDEX: 41.4 KG/M2 | RESPIRATION RATE: 17 BRPM | WEIGHT: 248.46 LBS

## 2023-02-04 LAB
ANION GAP SERPL CALC-SCNC: 8 MMOL/L (ref 7–19)
AORTIC VALVE AREA (AVA): 0.8
ASCENDING AORTA (AAD): 3
AV MEAN GRADIENT (AVMG): 5
AV MEAN VELOCITY (AVMV): 1.03
AV PEAK GRADIENT (AVPG): 9
AV PEAK VELOCITY (AVPV): 1.52
AV STENOSIS SEVERITY TEXT: NORMAL
AVI LVOT PEAK GRADIENT (LVOTMG): 1
BASOPHILS # BLD: 0.1 K/MCL (ref 0–0.3)
BASOPHILS NFR BLD: 1 %
BUN SERPL-MCNC: 8 MG/DL (ref 6–20)
BUN/CREAT SERPL: 9 (ref 7–25)
CALCIUM SERPL-MCNC: 8.3 MG/DL (ref 8.4–10.2)
CHLORIDE SERPL-SCNC: 112 MMOL/L (ref 97–110)
CO2 SERPL-SCNC: 25 MMOL/L (ref 21–32)
CREAT SERPL-MCNC: 0.88 MG/DL (ref 0.51–0.95)
DEPRECATED RDW RBC: 37.2 FL (ref 39–50)
E WAVE DECELARATION TIME (MDT): 8.51
EOSINOPHIL # BLD: 0.3 K/MCL (ref 0–0.5)
EOSINOPHIL NFR BLD: 5 %
ERYTHROCYTE [DISTWIDTH] IN BLOOD: 16.3 % (ref 11–15)
FASTING DURATION TIME PATIENT: ABNORMAL H
GFR SERPLBLD BASED ON 1.73 SQ M-ARVRAT: 87 ML/MIN
GLUCOSE SERPL-MCNC: 96 MG/DL (ref 70–99)
HCT VFR BLD CALC: 30.4 % (ref 36–46.5)
HGB BLD-MCNC: 9.4 G/DL (ref 12–15.5)
IMM GRANULOCYTES # BLD AUTO: 0 K/MCL (ref 0–0.2)
IMM GRANULOCYTES # BLD: 0 %
INTERVENTRICULAR SEPTUM IN END DIASTOLE (IVSD): 3.16
LEFT INTERNAL DIMENSION IN SYSTOLE (LVSD): 0.8
LEFT VENTRICULAR INTERNAL DIMENSION IN DIASTOLE (LVDD): 2.6
LEFT VENTRICULAR POSTERIOR WALL IN END DIASTOLE (LVPW): 4.2
LV EF: NORMAL %
LVOT 2D (LVOTD): 18
LVOT VTI (LVOTVTI): 0.89
LYMPHOCYTES # BLD: 2.2 K/MCL (ref 1–4.8)
LYMPHOCYTES NFR BLD: 35 %
MCH RBC QN AUTO: 19.9 PG (ref 26–34)
MCHC RBC AUTO-ENTMCNC: 30.9 G/DL (ref 32–36.5)
MCV RBC AUTO: 64.3 FL (ref 78–100)
MONOCYTES # BLD: 0.3 K/MCL (ref 0.3–0.9)
MONOCYTES NFR BLD: 5 %
MV E TISSUE VEL MED (MESV): 14.6
MV E WAVE VEL/E TISSUE VEL MED(MSR): 9.36
MV PEAK A VELOCITY (MVPAV): 152
MV PEAK E VELOCITY (MVPEV): 0.61
NEUTROPHILS # BLD: 3.4 K/MCL (ref 1.8–7.7)
NEUTROPHILS NFR BLD: 54 %
NRBC BLD MANUAL-RTO: 0 /100 WBC
PLATELET # BLD AUTO: 362 K/MCL (ref 140–450)
POTASSIUM SERPL-SCNC: 4.6 MMOL/L (ref 3.4–5.1)
RAINBOW EXTRA TUBES HOLD SPECIMEN: NORMAL
RBC # BLD: 4.73 MIL/MCL (ref 4–5.2)
SODIUM SERPL-SCNC: 140 MMOL/L (ref 135–145)
TRICUSPID VALVE PEAK REGURGITATION VELOCITY (TRPV): 2.4
WBC # BLD: 6.3 K/MCL (ref 4.2–11)

## 2023-02-04 PROCEDURE — 85025 COMPLETE CBC W/AUTO DIFF WBC: CPT | Performed by: INTERNAL MEDICINE

## 2023-02-04 PROCEDURE — 10002803 HB RX 637: Performed by: INTERNAL MEDICINE

## 2023-02-04 PROCEDURE — 36415 COLL VENOUS BLD VENIPUNCTURE: CPT | Performed by: INTERNAL MEDICINE

## 2023-02-04 PROCEDURE — G0378 HOSPITAL OBSERVATION PER HR: HCPCS

## 2023-02-04 PROCEDURE — 70551 MRI BRAIN STEM W/O DYE: CPT

## 2023-02-04 PROCEDURE — 10002803 HB RX 637: Performed by: PSYCHIATRY & NEUROLOGY

## 2023-02-04 PROCEDURE — 80048 BASIC METABOLIC PNL TOTAL CA: CPT | Performed by: INTERNAL MEDICINE

## 2023-02-04 PROCEDURE — 10002019 HB COUNTER RESP ASSESSMENT

## 2023-02-04 PROCEDURE — 93306 TTE W/DOPPLER COMPLETE: CPT

## 2023-02-04 PROCEDURE — G1004 CDSM NDSC: HCPCS

## 2023-02-04 PROCEDURE — 10004180 HB COUNTER-TRANSPORT

## 2023-02-04 PROCEDURE — 10004651 HB RX, NO CHARGE ITEM: Performed by: INTERNAL MEDICINE

## 2023-02-04 RX ORDER — BUTALBITAL, ACETAMINOPHEN AND CAFFEINE 50; 325; 40 MG/1; MG/1; MG/1
1 TABLET ORAL EVERY 8 HOURS PRN
Qty: 10 TABLET | Refills: 0 | Status: SHIPPED | OUTPATIENT
Start: 2023-02-04 | End: 2023-02-06 | Stop reason: DRUGHIGH

## 2023-02-04 RX ORDER — ALBUTEROL SULFATE 2.5 MG/3ML
2.5 SOLUTION RESPIRATORY (INHALATION)
Status: DISCONTINUED | OUTPATIENT
Start: 2023-02-04 | End: 2023-02-04 | Stop reason: HOSPADM

## 2023-02-04 RX ORDER — CLONAZEPAM 0.5 MG/1
0.5 TABLET ORAL
Status: COMPLETED | OUTPATIENT
Start: 2023-02-04 | End: 2023-02-04

## 2023-02-04 RX ADMIN — BUTALBITAL, ACETAMINOPHEN, AND CAFFEINE 1 TABLET: 325; 50; 40 TABLET ORAL at 15:22

## 2023-02-04 RX ADMIN — GABAPENTIN 600 MG: 300 CAPSULE ORAL at 13:43

## 2023-02-04 RX ADMIN — SODIUM CHLORIDE, PRESERVATIVE FREE 2 ML: 5 INJECTION INTRAVENOUS at 08:38

## 2023-02-04 RX ADMIN — TOPIRAMATE 100 MG: 100 TABLET ORAL at 08:31

## 2023-02-04 RX ADMIN — CLONAZEPAM 0.5 MG: 0.5 TABLET ORAL at 11:06

## 2023-02-04 RX ADMIN — LAMOTRIGINE 150 MG: 100 TABLET ORAL at 09:42

## 2023-02-04 RX ADMIN — VORTIOXETINE 20 MG: 10 TABLET, FILM COATED ORAL at 08:32

## 2023-02-04 RX ADMIN — BUTALBITAL, ACETAMINOPHEN, AND CAFFEINE 1 TABLET: 325; 50; 40 TABLET ORAL at 08:31

## 2023-02-04 RX ADMIN — GABAPENTIN 600 MG: 300 CAPSULE ORAL at 08:33

## 2023-02-04 RX ADMIN — PROPRANOLOL HYDROCHLORIDE 60 MG: 60 CAPSULE, EXTENDED RELEASE ORAL at 08:33

## 2023-02-04 ASSESSMENT — PAIN SCALES - GENERAL
PAINLEVEL_OUTOF10: 1
PAINLEVEL_OUTOF10: 7
PAINLEVEL_OUTOF10: 8

## 2023-02-04 ASSESSMENT — PATIENT HEALTH QUESTIONNAIRE - PHQ9: IS PATIENT ABLE TO COMPLETE PHQ2 OR PHQ9: YES

## 2023-02-04 ASSESSMENT — PULMONARY FUNCTION TESTS: FEV1/FVC: UNABLE TO OBTAIN, OR GREATER THAN 70%

## 2023-02-06 ENCOUNTER — HOSPITAL ENCOUNTER (INPATIENT)
Age: 38
LOS: 2 days | Discharge: HOME | DRG: 812 | End: 2023-02-09
Attending: EMERGENCY MEDICINE | Admitting: HOSPITALIST

## 2023-02-06 DIAGNOSIS — T39.1X1A ACCIDENTAL ACETAMINOPHEN OVERDOSE, INITIAL ENCOUNTER: Primary | ICD-10-CM

## 2023-02-06 LAB
ALBUMIN SERPL-MCNC: 3.5 G/DL (ref 3.6–5.1)
ALBUMIN/GLOB SERPL: 1.1 {RATIO} (ref 1–2.4)
ALP SERPL-CCNC: 49 UNITS/L (ref 45–117)
ALT SERPL-CCNC: 24 UNITS/L
ANION GAP SERPL CALC-SCNC: 10 MMOL/L (ref 7–19)
APAP SERPL-MCNC: 112 MCG/ML (ref 10–30)
APTT PPP: 28 SEC (ref 22–30)
AST SERPL-CCNC: 24 UNITS/L
ATRIAL RATE (BPM): 79
ATRIAL RATE (BPM): 79
BASOPHILS # BLD: 0.1 K/MCL (ref 0–0.3)
BASOPHILS NFR BLD: 1 %
BILIRUB SERPL-MCNC: 0.3 MG/DL (ref 0.2–1)
BUN SERPL-MCNC: 8 MG/DL (ref 6–20)
BUN/CREAT SERPL: 10 (ref 7–25)
CALCIUM SERPL-MCNC: 8.9 MG/DL (ref 8.4–10.2)
CHLORIDE SERPL-SCNC: 113 MMOL/L (ref 97–110)
CO2 SERPL-SCNC: 21 MMOL/L (ref 21–32)
CREAT SERPL-MCNC: 0.78 MG/DL (ref 0.51–0.95)
DEPRECATED RDW RBC: 36.7 FL (ref 39–50)
EOSINOPHIL # BLD: 0.3 K/MCL (ref 0–0.5)
EOSINOPHIL NFR BLD: 5 %
ERYTHROCYTE [DISTWIDTH] IN BLOOD: 16.7 % (ref 11–15)
ETHANOL SERPL-MCNC: NORMAL MG/DL
FASTING DURATION TIME PATIENT: ABNORMAL H
GFR SERPLBLD BASED ON 1.73 SQ M-ARVRAT: >90 ML/MIN
GLOBULIN SER-MCNC: 3.3 G/DL (ref 2–4)
GLUCOSE SERPL-MCNC: 113 MG/DL (ref 70–99)
HCT VFR BLD CALC: 34.7 % (ref 36–46.5)
HGB BLD-MCNC: 10.6 G/DL (ref 12–15.5)
IMM GRANULOCYTES # BLD AUTO: 0 K/MCL (ref 0–0.2)
IMM GRANULOCYTES # BLD: 0 %
INR PPP: 1.3
LIPASE SERPL-CCNC: 140 UNITS/L (ref 73–393)
LYMPHOCYTES # BLD: 1.7 K/MCL (ref 1–4.8)
LYMPHOCYTES NFR BLD: 28 %
MCH RBC QN AUTO: 19.4 PG (ref 26–34)
MCHC RBC AUTO-ENTMCNC: 30.5 G/DL (ref 32–36.5)
MCV RBC AUTO: 63.6 FL (ref 78–100)
MONOCYTES # BLD: 0.3 K/MCL (ref 0.3–0.9)
MONOCYTES NFR BLD: 5 %
NEUTROPHILS # BLD: 3.8 K/MCL (ref 1.8–7.7)
NEUTROPHILS NFR BLD: 61 %
NRBC BLD MANUAL-RTO: 0 /100 WBC
P AXIS (DEGREES): 1
P AXIS (DEGREES): 1
PLATELET # BLD AUTO: 379 K/MCL (ref 140–450)
POTASSIUM SERPL-SCNC: 3.5 MMOL/L (ref 3.4–5.1)
PR-INTERVAL (MSEC): 198
PR-INTERVAL (MSEC): 198
PROT SERPL-MCNC: 6.8 G/DL (ref 6.4–8.2)
PROTHROMBIN TIME: 12.8 SEC (ref 9.7–11.8)
QRS-INTERVAL (MSEC): 82
QRS-INTERVAL (MSEC): 82
QT-INTERVAL (MSEC): 402
QT-INTERVAL (MSEC): 402
QTC: 461
QTC: 461
R AXIS (DEGREES): 25
R AXIS (DEGREES): 25
RBC # BLD: 5.46 MIL/MCL (ref 4–5.2)
REPORT TEXT: NORMAL
REPORT TEXT: NORMAL
SALICYLATES SERPL-MCNC: <2.8 MG/DL
SODIUM SERPL-SCNC: 140 MMOL/L (ref 135–145)
T AXIS (DEGREES): 50
T AXIS (DEGREES): 50
VENTRICULAR RATE EKG/MIN (BPM): 79
VENTRICULAR RATE EKG/MIN (BPM): 79
WBC # BLD: 6.3 K/MCL (ref 4.2–11)

## 2023-02-06 PROCEDURE — 80053 COMPREHEN METABOLIC PANEL: CPT | Performed by: EMERGENCY MEDICINE

## 2023-02-06 PROCEDURE — 99285 EMERGENCY DEPT VISIT HI MDM: CPT

## 2023-02-06 PROCEDURE — 82077 ASSAY SPEC XCP UR&BREATH IA: CPT | Performed by: EMERGENCY MEDICINE

## 2023-02-06 PROCEDURE — 85610 PROTHROMBIN TIME: CPT | Performed by: EMERGENCY MEDICINE

## 2023-02-06 PROCEDURE — 85025 COMPLETE CBC W/AUTO DIFF WBC: CPT | Performed by: EMERGENCY MEDICINE

## 2023-02-06 PROCEDURE — 85730 THROMBOPLASTIN TIME PARTIAL: CPT | Performed by: EMERGENCY MEDICINE

## 2023-02-06 PROCEDURE — 96365 THER/PROPH/DIAG IV INF INIT: CPT

## 2023-02-06 PROCEDURE — 96375 TX/PRO/DX INJ NEW DRUG ADDON: CPT

## 2023-02-06 PROCEDURE — 80179 DRUG ASSAY SALICYLATE: CPT | Performed by: EMERGENCY MEDICINE

## 2023-02-06 PROCEDURE — 93005 ELECTROCARDIOGRAM TRACING: CPT | Performed by: EMERGENCY MEDICINE

## 2023-02-06 PROCEDURE — 83690 ASSAY OF LIPASE: CPT | Performed by: EMERGENCY MEDICINE

## 2023-02-06 PROCEDURE — 80143 DRUG ASSAY ACETAMINOPHEN: CPT | Performed by: EMERGENCY MEDICINE

## 2023-02-06 PROCEDURE — 96366 THER/PROPH/DIAG IV INF ADDON: CPT

## 2023-02-06 RX ORDER — BUTALBITAL, ACETAMINOPHEN AND CAFFEINE 300; 40; 50 MG/1; MG/1; MG/1
1-2 CAPSULE ORAL EVERY 6 HOURS PRN
Qty: 24 CAPSULE | Refills: 0 | Status: ON HOLD | OUTPATIENT
Start: 2023-02-06 | End: 2023-02-09 | Stop reason: HOSPADM

## 2023-02-06 ASSESSMENT — ENCOUNTER SYMPTOMS
SHORTNESS OF BREATH: 0
NUMBNESS: 0
ABDOMINAL PAIN: 1
CHILLS: 0
HEADACHES: 0
FEVER: 0
POLYDIPSIA: 0
CONFUSION: 0
WOUND: 0
BACK PAIN: 0
NAUSEA: 1
DIARRHEA: 0
VOMITING: 1
EYE REDNESS: 0
DIZZINESS: 0
ACTIVITY CHANGE: 0
COLOR CHANGE: 0
SORE THROAT: 0
COUGH: 0
WHEEZING: 0
EYE PAIN: 0
FACIAL SWELLING: 0
EYE DISCHARGE: 0
BRUISES/BLEEDS EASILY: 0
POLYPHAGIA: 0

## 2023-02-07 PROBLEM — T39.1X1A ACCIDENTAL ACETAMINOPHEN OVERDOSE, INITIAL ENCOUNTER: Status: ACTIVE | Noted: 2023-02-07

## 2023-02-07 LAB
ALBUMIN SERPL-MCNC: 3.1 G/DL (ref 3.6–5.1)
ALBUMIN SERPL-MCNC: 3.1 G/DL (ref 3.6–5.1)
ALBUMIN SERPL-MCNC: 3.2 G/DL (ref 3.6–5.1)
ALBUMIN/GLOB SERPL: 1 {RATIO} (ref 1–2.4)
ALBUMIN/GLOB SERPL: 1 {RATIO} (ref 1–2.4)
ALP SERPL-CCNC: 43 UNITS/L (ref 45–117)
ALP SERPL-CCNC: 45 UNITS/L (ref 45–117)
ALP SERPL-CCNC: 47 UNITS/L (ref 45–117)
ALT SERPL-CCNC: 332 UNITS/L
ALT SERPL-CCNC: 52 UNITS/L
ALT SERPL-CCNC: 71 UNITS/L
AMPHETAMINES UR QL SCN>500 NG/ML: NEGATIVE
ANION GAP SERPL CALC-SCNC: 13 MMOL/L (ref 7–19)
ANION GAP SERPL CALC-SCNC: 16 MMOL/L (ref 7–19)
APAP SERPL-MCNC: 46 MCG/ML (ref 10–30)
APAP SERPL-MCNC: <2 MCG/ML (ref 10–30)
AST SERPL-CCNC: 260 UNITS/L
AST SERPL-CCNC: 42 UNITS/L
AST SERPL-CCNC: 44 UNITS/L
ATRIAL RATE (BPM): 80
BARBITURATES UR QL SCN>200 NG/ML: POSITIVE
BENZODIAZ UR QL SCN>200 NG/ML: NEGATIVE
BILIRUB CONJ SERPL-MCNC: 0.1 MG/DL (ref 0–0.2)
BILIRUB SERPL-MCNC: 0.4 MG/DL (ref 0.2–1)
BILIRUB SERPL-MCNC: 0.6 MG/DL (ref 0.2–1)
BILIRUB SERPL-MCNC: 0.8 MG/DL (ref 0.2–1)
BUN SERPL-MCNC: 6 MG/DL (ref 6–20)
BUN SERPL-MCNC: 8 MG/DL (ref 6–20)
BUN/CREAT SERPL: 10 (ref 7–25)
BUN/CREAT SERPL: 13 (ref 7–25)
BZE UR QL SCN>150 NG/ML: NEGATIVE
CALCIUM SERPL-MCNC: 8.3 MG/DL (ref 8.4–10.2)
CALCIUM SERPL-MCNC: 8.4 MG/DL (ref 8.4–10.2)
CANNABINOIDS UR QL SCN>50 NG/ML: POSITIVE
CHLORIDE SERPL-SCNC: 109 MMOL/L (ref 97–110)
CHLORIDE SERPL-SCNC: 109 MMOL/L (ref 97–110)
CO2 SERPL-SCNC: 22 MMOL/L (ref 21–32)
CO2 SERPL-SCNC: 22 MMOL/L (ref 21–32)
CREAT SERPL-MCNC: 0.59 MG/DL (ref 0.51–0.95)
CREAT SERPL-MCNC: 0.64 MG/DL (ref 0.51–0.95)
DEPRECATED RDW RBC: 36.4 FL (ref 39–50)
ERYTHROCYTE [DISTWIDTH] IN BLOOD: 16.3 % (ref 11–15)
FASTING DURATION TIME PATIENT: ABNORMAL H
FASTING DURATION TIME PATIENT: ABNORMAL H
GFR SERPLBLD BASED ON 1.73 SQ M-ARVRAT: >90 ML/MIN
GFR SERPLBLD BASED ON 1.73 SQ M-ARVRAT: >90 ML/MIN
GLOBULIN SER-MCNC: 3.2 G/DL (ref 2–4)
GLOBULIN SER-MCNC: 3.2 G/DL (ref 2–4)
GLUCOSE SERPL-MCNC: 113 MG/DL (ref 70–99)
GLUCOSE SERPL-MCNC: 149 MG/DL (ref 70–99)
HCG UR QL: NEGATIVE
HCT VFR BLD CALC: 35.4 % (ref 36–46.5)
HGB BLD-MCNC: 10.5 G/DL (ref 12–15.5)
INR PPP: 1.6
MCH RBC QN AUTO: 18.9 PG (ref 26–34)
MCHC RBC AUTO-ENTMCNC: 29.7 G/DL (ref 32–36.5)
MCV RBC AUTO: 63.7 FL (ref 78–100)
NRBC BLD MANUAL-RTO: 0 /100 WBC
OPIATES UR QL SCN>300 NG/ML: NEGATIVE
P AXIS (DEGREES): 44
PCP UR QL SCN>25 NG/ML: NEGATIVE
PLATELET # BLD AUTO: 417 K/MCL (ref 140–450)
POTASSIUM SERPL-SCNC: 3.1 MMOL/L (ref 3.4–5.1)
POTASSIUM SERPL-SCNC: 3.3 MMOL/L (ref 3.4–5.1)
PR-INTERVAL (MSEC): 152
PROT SERPL-MCNC: 6.3 G/DL (ref 6.4–8.2)
PROT SERPL-MCNC: 6.3 G/DL (ref 6.4–8.2)
PROT SERPL-MCNC: 6.4 G/DL (ref 6.4–8.2)
PROTHROMBIN TIME: 16.1 SEC (ref 9.7–11.8)
QRS-INTERVAL (MSEC): 86
QT-INTERVAL (MSEC): 396
QTC: 457
R AXIS (DEGREES): 16
RAINBOW EXTRA TUBES HOLD SPECIMEN: NORMAL
RBC # BLD: 5.56 MIL/MCL (ref 4–5.2)
REPORT TEXT: NORMAL
SODIUM SERPL-SCNC: 141 MMOL/L (ref 135–145)
SODIUM SERPL-SCNC: 144 MMOL/L (ref 135–145)
T AXIS (DEGREES): 48
VENTRICULAR RATE EKG/MIN (BPM): 80
WBC # BLD: 8.8 K/MCL (ref 4.2–11)

## 2023-02-07 PROCEDURE — 96365 THER/PROPH/DIAG IV INF INIT: CPT

## 2023-02-07 PROCEDURE — 10002800 HB RX 250 W HCPCS: Performed by: EMERGENCY MEDICINE

## 2023-02-07 PROCEDURE — 10002800 HB RX 250 W HCPCS: Performed by: HOSPITALIST

## 2023-02-07 PROCEDURE — 80053 COMPREHEN METABOLIC PANEL: CPT | Performed by: INTERNAL MEDICINE

## 2023-02-07 PROCEDURE — 84703 CHORIONIC GONADOTROPIN ASSAY: CPT

## 2023-02-07 PROCEDURE — 96375 TX/PRO/DX INJ NEW DRUG ADDON: CPT

## 2023-02-07 PROCEDURE — 80143 DRUG ASSAY ACETAMINOPHEN: CPT | Performed by: EMERGENCY MEDICINE

## 2023-02-07 PROCEDURE — 80307 DRUG TEST PRSMV CHEM ANLYZR: CPT | Performed by: EMERGENCY MEDICINE

## 2023-02-07 PROCEDURE — 10002803 HB RX 637: Performed by: INTERNAL MEDICINE

## 2023-02-07 PROCEDURE — 85610 PROTHROMBIN TIME: CPT | Performed by: INTERNAL MEDICINE

## 2023-02-07 PROCEDURE — 10006031 HB ROOM CHARGE TELEMETRY

## 2023-02-07 PROCEDURE — 10002807 HB RX 258: Performed by: EMERGENCY MEDICINE

## 2023-02-07 PROCEDURE — 90792 PSYCH DIAG EVAL W/MED SRVCS: CPT | Performed by: PSYCHIATRY & NEUROLOGY

## 2023-02-07 PROCEDURE — 10002800 HB RX 250 W HCPCS: Performed by: INTERNAL MEDICINE

## 2023-02-07 PROCEDURE — 90839 PSYTX CRISIS INITIAL 60 MIN: CPT

## 2023-02-07 PROCEDURE — 36415 COLL VENOUS BLD VENIPUNCTURE: CPT | Performed by: HOSPITALIST

## 2023-02-07 PROCEDURE — 80143 DRUG ASSAY ACETAMINOPHEN: CPT | Performed by: INTERNAL MEDICINE

## 2023-02-07 PROCEDURE — 10004651 HB RX, NO CHARGE ITEM: Performed by: HOSPITALIST

## 2023-02-07 PROCEDURE — 80053 COMPREHEN METABOLIC PANEL: CPT | Performed by: HOSPITALIST

## 2023-02-07 PROCEDURE — 85027 COMPLETE CBC AUTOMATED: CPT | Performed by: HOSPITALIST

## 2023-02-07 PROCEDURE — 80076 HEPATIC FUNCTION PANEL: CPT | Performed by: EMERGENCY MEDICINE

## 2023-02-07 RX ORDER — SUMATRIPTAN 50 MG/1
100 TABLET, FILM COATED ORAL
Status: DISCONTINUED | OUTPATIENT
Start: 2023-02-07 | End: 2023-02-09 | Stop reason: HOSPADM

## 2023-02-07 RX ORDER — TRAZODONE HYDROCHLORIDE 100 MG/1
100 TABLET ORAL NIGHTLY PRN
Status: DISCONTINUED | OUTPATIENT
Start: 2023-02-07 | End: 2023-02-09 | Stop reason: HOSPADM

## 2023-02-07 RX ORDER — LAMOTRIGINE 150 MG/1
150 TABLET ORAL 2 TIMES DAILY
Status: DISCONTINUED | OUTPATIENT
Start: 2023-02-07 | End: 2023-02-09 | Stop reason: HOSPADM

## 2023-02-07 RX ORDER — ALBUTEROL SULFATE 90 UG/1
2 AEROSOL, METERED RESPIRATORY (INHALATION) EVERY 4 HOURS PRN
Status: DISCONTINUED | OUTPATIENT
Start: 2023-02-07 | End: 2023-02-07

## 2023-02-07 RX ORDER — HYDROXYZINE HYDROCHLORIDE 25 MG/1
50 TABLET, FILM COATED ORAL 2 TIMES DAILY PRN
Status: DISCONTINUED | OUTPATIENT
Start: 2023-02-07 | End: 2023-02-09 | Stop reason: HOSPADM

## 2023-02-07 RX ORDER — POTASSIUM CHLORIDE 14.9 MG/ML
20 INJECTION INTRAVENOUS ONCE
Status: COMPLETED | OUTPATIENT
Start: 2023-02-07 | End: 2023-02-08

## 2023-02-07 RX ORDER — ONDANSETRON 2 MG/ML
4 INJECTION INTRAMUSCULAR; INTRAVENOUS EVERY 6 HOURS PRN
Status: DISCONTINUED | OUTPATIENT
Start: 2023-02-07 | End: 2023-02-09 | Stop reason: HOSPADM

## 2023-02-07 RX ORDER — 0.9 % SODIUM CHLORIDE 0.9 %
2 VIAL (ML) INJECTION EVERY 12 HOURS SCHEDULED
Status: DISCONTINUED | OUTPATIENT
Start: 2023-02-07 | End: 2023-02-09 | Stop reason: HOSPADM

## 2023-02-07 RX ORDER — ALBUTEROL SULFATE 2.5 MG/3ML
2.5 SOLUTION RESPIRATORY (INHALATION)
Status: DISCONTINUED | OUTPATIENT
Start: 2023-02-07 | End: 2023-02-09 | Stop reason: HOSPADM

## 2023-02-07 RX ORDER — GABAPENTIN 300 MG/1
600 CAPSULE ORAL 4 TIMES DAILY
Status: DISCONTINUED | OUTPATIENT
Start: 2023-02-07 | End: 2023-02-09 | Stop reason: HOSPADM

## 2023-02-07 RX ORDER — HEPARIN SODIUM 5000 [USP'U]/ML
5000 INJECTION, SOLUTION INTRAVENOUS; SUBCUTANEOUS EVERY 8 HOURS SCHEDULED
Status: DISCONTINUED | OUTPATIENT
Start: 2023-02-07 | End: 2023-02-09 | Stop reason: HOSPADM

## 2023-02-07 RX ORDER — TOPIRAMATE 25 MG/1
100 TABLET ORAL 2 TIMES DAILY
Status: DISCONTINUED | OUTPATIENT
Start: 2023-02-07 | End: 2023-02-09 | Stop reason: HOSPADM

## 2023-02-07 RX ORDER — PROPRANOLOL HCL 60 MG
60 CAPSULE, EXTENDED RELEASE 24HR ORAL DAILY
Status: DISCONTINUED | OUTPATIENT
Start: 2023-02-07 | End: 2023-02-09 | Stop reason: HOSPADM

## 2023-02-07 RX ORDER — POTASSIUM CHLORIDE 14.9 MG/ML
20 INJECTION INTRAVENOUS ONCE
Status: DISCONTINUED | OUTPATIENT
Start: 2023-02-08 | End: 2023-02-08

## 2023-02-07 RX ADMIN — SODIUM CHLORIDE, PRESERVATIVE FREE 2 ML: 5 INJECTION INTRAVENOUS at 23:07

## 2023-02-07 RX ADMIN — GABAPENTIN 600 MG: 300 CAPSULE ORAL at 12:18

## 2023-02-07 RX ADMIN — HEPARIN SODIUM 5000 UNITS: 5000 INJECTION INTRAVENOUS; SUBCUTANEOUS at 05:14

## 2023-02-07 RX ADMIN — ACETYLCYSTEINE 15000 MG: 200 INJECTION, SOLUTION INTRAVENOUS at 00:33

## 2023-02-07 RX ADMIN — HEPARIN SODIUM 5000 UNITS: 5000 INJECTION INTRAVENOUS; SUBCUTANEOUS at 13:27

## 2023-02-07 RX ADMIN — GABAPENTIN 600 MG: 300 CAPSULE ORAL at 16:56

## 2023-02-07 RX ADMIN — PROPRANOLOL HYDROCHLORIDE 60 MG: 60 CAPSULE, EXTENDED RELEASE ORAL at 12:18

## 2023-02-07 RX ADMIN — LAMOTRIGINE 150 MG: 150 TABLET ORAL at 13:30

## 2023-02-07 RX ADMIN — VORTIOXETINE 20 MG: 10 TABLET, FILM COATED ORAL at 12:18

## 2023-02-07 RX ADMIN — PRAZOSIN HYDROCHLORIDE 8 MG: 1 CAPSULE ORAL at 23:05

## 2023-02-07 RX ADMIN — LAMOTRIGINE 150 MG: 150 TABLET ORAL at 23:05

## 2023-02-07 RX ADMIN — TOPIRAMATE 100 MG: 25 TABLET, FILM COATED ORAL at 23:05

## 2023-02-07 RX ADMIN — ACETYLCYSTEINE 25000 MG: 200 INJECTION, SOLUTION INTRAVENOUS at 01:26

## 2023-02-07 RX ADMIN — POTASSIUM CHLORIDE 20 MEQ: 14.9 INJECTION, SOLUTION INTRAVENOUS at 23:16

## 2023-02-07 RX ADMIN — ONDANSETRON 4 MG: 2 INJECTION INTRAMUSCULAR; INTRAVENOUS at 13:23

## 2023-02-07 RX ADMIN — GABAPENTIN 600 MG: 300 CAPSULE ORAL at 23:05

## 2023-02-07 RX ADMIN — HEPARIN SODIUM 5000 UNITS: 5000 INJECTION INTRAVENOUS; SUBCUTANEOUS at 23:09

## 2023-02-07 RX ADMIN — TOPIRAMATE 100 MG: 25 TABLET, FILM COATED ORAL at 12:18

## 2023-02-07 SDOH — HEALTH STABILITY: GENERAL
BECAUSE OF A PHYSICAL, MENTAL, OR EMOTIONAL CONDITION, DO YOU HAVE SERIOUS DIFFICULTY CONCENTRATING, REMEMBERING OR MAKING DECISIONS?: NO

## 2023-02-07 SDOH — ECONOMIC STABILITY: FOOD INSECURITY: HOW OFTEN IN THE PAST 12 MONTHS WERE YOU WORRIED OR STRESSED ABOUT HAVING ENOUGH MONEY TO BUY NUTRITIOUS MEALS?: NEVER

## 2023-02-07 SDOH — HEALTH STABILITY: GENERAL: BECAUSE OF A PHYSICAL, MENTAL, OR EMOTIONAL CONDITION, DO YOU HAVE DIFFICULTY DOING ERRANDS ALONE?: NO

## 2023-02-07 SDOH — ECONOMIC STABILITY: TRANSPORTATION INSECURITY
IN THE PAST 12 MONTHS, HAS LACK OF TRANSPORTATION KEPT YOU FROM MEETINGS, WORK, OR FROM GETTING THINGS NEEDED FOR DAILY LIVING?: NO

## 2023-02-07 SDOH — HEALTH STABILITY: PHYSICAL HEALTH: DO YOU HAVE SERIOUS DIFFICULTY WALKING OR CLIMBING STAIRS?: NO

## 2023-02-07 SDOH — ECONOMIC STABILITY: GENERAL

## 2023-02-07 SDOH — ECONOMIC STABILITY: HOUSING INSECURITY: WHAT IS YOUR LIVING SITUATION TODAY?: SPOUSE

## 2023-02-07 SDOH — ECONOMIC STABILITY: HOUSING INSECURITY: WHAT IS YOUR LIVING SITUATION TODAY?: APARTMENT

## 2023-02-07 SDOH — ECONOMIC STABILITY: TRANSPORTATION INSECURITY
IN THE PAST 12 MONTHS, HAS THE LACK OF TRANSPORTATION KEPT YOU FROM MEDICAL APPOINTMENTS OR FROM GETTING MEDICATIONS?: NO

## 2023-02-07 SDOH — SOCIAL STABILITY: SOCIAL NETWORK: SUPPORT SYSTEMS: SPOUSE;FRIENDS;FAMILY MEMBERS

## 2023-02-07 SDOH — HEALTH STABILITY: PHYSICAL HEALTH: DO YOU HAVE DIFFICULTY DRESSING OR BATHING?: NO

## 2023-02-07 SDOH — ECONOMIC STABILITY: HOUSING INSECURITY: ARE YOU WORRIED ABOUT LOSING YOUR HOUSING?: NO

## 2023-02-07 ASSESSMENT — LIFESTYLE VARIABLES
HOW OFTEN DO YOU HAVE 6 OR MORE DRINKS ON ONE OCCASION: NEVER
ALCOHOL_USE_STATUS: NO OR LOW RISK WITH VALIDATED TOOL
HOW MANY STANDARD DRINKS CONTAINING ALCOHOL DO YOU HAVE ON A TYPICAL DAY: 0,1 OR 2
HOW OFTEN DO YOU HAVE A DRINK CONTAINING ALCOHOL: NEVER
HOW MANY STANDARD DRINKS CONTAINING ALCOHOL DO YOU HAVE ON A TYPICAL DAY: 0,1 OR 2
AUDIT-C TOTAL SCORE: 0
HOW OFTEN DO YOU HAVE A DRINK CONTAINING ALCOHOL: MONTHLY OR LESS

## 2023-02-07 ASSESSMENT — COGNITIVE AND FUNCTIONAL STATUS - GENERAL
ORIENTATION: ORIENTED (PERSON/PLACE/TIME)
MOOD: ANXIOUS
BEHAVIOR: APPROPRIATE TO SITUATION
MEMORY: INTACT
SPEECH: CLEAR/UNDERSTANDABLE
AFFECT: APPROPRIATE TO SITUATION;ANXIOUS
LEVEL_OF_CONSCIOUSNESS_CALCULATED: ALERT
ATTENTION_CALCULATED: REDUCED ABILITY TO MAINTAIN ATTENTION TO STIMULI
PERCEPTUAL_MISINTERPRETATIONS_HALLUCINATIONS: CLEAR REALITY BASED PERCEPTIONS

## 2023-02-07 ASSESSMENT — ACTIVITIES OF DAILY LIVING (ADL)
RECENT_DECLINE_ADL: NO
ADL_BEFORE_ADMISSION: INDEPENDENT
ADL_SCORE: 12
ADL_SHORT_OF_BREATH: NO

## 2023-02-07 ASSESSMENT — PATIENT HEALTH QUESTIONNAIRE - PHQ9
SUM OF ALL RESPONSES TO PHQ9 QUESTIONS 1 AND 2: 0
2. FEELING DOWN, DEPRESSED OR HOPELESS: NOT AT ALL
CLINICAL INTERPRETATION OF PHQ2 SCORE: NO FURTHER SCREENING NEEDED
1. LITTLE INTEREST OR PLEASURE IN DOING THINGS: NOT AT ALL
SUM OF ALL RESPONSES TO PHQ9 QUESTIONS 1 AND 2: 0
IS PATIENT ABLE TO COMPLETE PHQ2 OR PHQ9: YES

## 2023-02-07 ASSESSMENT — COLUMBIA-SUICIDE SEVERITY RATING SCALE - C-SSRS
6. HAVE YOU EVER DONE ANYTHING, STARTED TO DO ANYTHING, OR PREPARED TO DO ANYTHING TO END YOUR LIFE?: YES
1. WITHIN THE PAST MONTH, HAVE YOU WISHED YOU WERE DEAD OR WISHED YOU COULD GO TO SLEEP AND NOT WAKE UP?: NO
2. HAVE YOU ACTUALLY HAD ANY THOUGHTS OF KILLING YOURSELF?: NO
HOW LONG AGO DID YOU DO ANY OF THESE?: WITHIN THE LAST THREE MONTHS
IS THE PATIENT ABLE TO COMPLETE C-SSRS: YES

## 2023-02-07 ASSESSMENT — PAIN SCALES - GENERAL
PAINLEVEL_OUTOF10: 0

## 2023-02-08 ENCOUNTER — APPOINTMENT (OUTPATIENT)
Dept: BEHAVIORAL HEALTH | Age: 38
End: 2023-02-08

## 2023-02-08 LAB
ALBUMIN SERPL-MCNC: 2.8 G/DL (ref 3.6–5.1)
ALBUMIN/GLOB SERPL: 0.9 {RATIO} (ref 1–2.4)
ALP SERPL-CCNC: 45 UNITS/L (ref 45–117)
ALT SERPL-CCNC: 826 UNITS/L
ANION GAP SERPL CALC-SCNC: 12 MMOL/L (ref 7–19)
AST SERPL-CCNC: 477 UNITS/L
BILIRUB SERPL-MCNC: 0.5 MG/DL (ref 0.2–1)
BUN SERPL-MCNC: 6 MG/DL (ref 6–20)
BUN/CREAT SERPL: 10 (ref 7–25)
CALCIUM SERPL-MCNC: 8.5 MG/DL (ref 8.4–10.2)
CHLORIDE SERPL-SCNC: 109 MMOL/L (ref 97–110)
CO2 SERPL-SCNC: 24 MMOL/L (ref 21–32)
CREAT SERPL-MCNC: 0.6 MG/DL (ref 0.51–0.95)
FASTING DURATION TIME PATIENT: ABNORMAL H
GFR SERPLBLD BASED ON 1.73 SQ M-ARVRAT: >90 ML/MIN
GLOBULIN SER-MCNC: 3.2 G/DL (ref 2–4)
GLUCOSE SERPL-MCNC: 102 MG/DL (ref 70–99)
INR PPP: 1.5
LACTATE BLDV-SCNC: 0.9 MMOL/L (ref 0–2)
PHOSPHATE SERPL-MCNC: 1.5 MG/DL (ref 2.4–4.7)
POTASSIUM SERPL-SCNC: 3.7 MMOL/L (ref 3.4–5.1)
POTASSIUM SERPL-SCNC: 4.1 MMOL/L (ref 3.4–5.1)
PROT SERPL-MCNC: 6 G/DL (ref 6.4–8.2)
PROTHROMBIN TIME: 15.3 SEC (ref 9.7–11.8)
SODIUM SERPL-SCNC: 141 MMOL/L (ref 135–145)

## 2023-02-08 PROCEDURE — 84132 ASSAY OF SERUM POTASSIUM: CPT | Performed by: HOSPITALIST

## 2023-02-08 PROCEDURE — 10002807 HB RX 258: Performed by: INTERNAL MEDICINE

## 2023-02-08 PROCEDURE — 10002800 HB RX 250 W HCPCS: Performed by: INTERNAL MEDICINE

## 2023-02-08 PROCEDURE — 80053 COMPREHEN METABOLIC PANEL: CPT | Performed by: HOSPITALIST

## 2023-02-08 PROCEDURE — 10006031 HB ROOM CHARGE TELEMETRY

## 2023-02-08 PROCEDURE — 10004651 HB RX, NO CHARGE ITEM: Performed by: HOSPITALIST

## 2023-02-08 PROCEDURE — 85610 PROTHROMBIN TIME: CPT | Performed by: HOSPITALIST

## 2023-02-08 PROCEDURE — 10002800 HB RX 250 W HCPCS: Performed by: HOSPITALIST

## 2023-02-08 PROCEDURE — 10002803 HB RX 637: Performed by: INTERNAL MEDICINE

## 2023-02-08 PROCEDURE — 83605 ASSAY OF LACTIC ACID: CPT | Performed by: HOSPITALIST

## 2023-02-08 PROCEDURE — 36415 COLL VENOUS BLD VENIPUNCTURE: CPT | Performed by: HOSPITALIST

## 2023-02-08 PROCEDURE — 10002803 HB RX 637: Performed by: HOSPITALIST

## 2023-02-08 PROCEDURE — 10002807 HB RX 258: Performed by: HOSPITALIST

## 2023-02-08 PROCEDURE — 84100 ASSAY OF PHOSPHORUS: CPT | Performed by: HOSPITALIST

## 2023-02-08 RX ORDER — CALCIUM CARBONATE 500 MG/1
500 TABLET, CHEWABLE ORAL EVERY 4 HOURS PRN
Status: DISCONTINUED | OUTPATIENT
Start: 2023-02-08 | End: 2023-02-09 | Stop reason: HOSPADM

## 2023-02-08 RX ORDER — POTASSIUM CHLORIDE 20 MEQ/1
40 TABLET, EXTENDED RELEASE ORAL ONCE
Status: COMPLETED | OUTPATIENT
Start: 2023-02-08 | End: 2023-02-08

## 2023-02-08 RX ADMIN — GABAPENTIN 600 MG: 300 CAPSULE ORAL at 17:59

## 2023-02-08 RX ADMIN — PRAZOSIN HYDROCHLORIDE 8 MG: 1 CAPSULE ORAL at 22:12

## 2023-02-08 RX ADMIN — SODIUM CHLORIDE, PRESERVATIVE FREE 2 ML: 5 INJECTION INTRAVENOUS at 22:22

## 2023-02-08 RX ADMIN — GABAPENTIN 600 MG: 300 CAPSULE ORAL at 13:50

## 2023-02-08 RX ADMIN — PROPRANOLOL HYDROCHLORIDE 60 MG: 60 CAPSULE, EXTENDED RELEASE ORAL at 09:14

## 2023-02-08 RX ADMIN — GABAPENTIN 600 MG: 300 CAPSULE ORAL at 21:16

## 2023-02-08 RX ADMIN — GABAPENTIN 600 MG: 300 CAPSULE ORAL at 09:14

## 2023-02-08 RX ADMIN — VORTIOXETINE 20 MG: 10 TABLET, FILM COATED ORAL at 09:14

## 2023-02-08 RX ADMIN — TOPIRAMATE 100 MG: 25 TABLET, FILM COATED ORAL at 09:14

## 2023-02-08 RX ADMIN — LAMOTRIGINE 150 MG: 150 TABLET ORAL at 09:14

## 2023-02-08 RX ADMIN — TOPIRAMATE 100 MG: 25 TABLET, FILM COATED ORAL at 21:16

## 2023-02-08 RX ADMIN — ACETYLCYSTEINE 25000 MG: 200 INJECTION, SOLUTION INTRAVENOUS at 01:18

## 2023-02-08 RX ADMIN — ACETYLCYSTEINE 25000 MG: 200 INJECTION, SOLUTION INTRAVENOUS at 22:15

## 2023-02-08 RX ADMIN — ANTACID TABLETS 500 MG: 500 TABLET, CHEWABLE ORAL at 21:16

## 2023-02-08 RX ADMIN — LAMOTRIGINE 150 MG: 150 TABLET ORAL at 21:16

## 2023-02-08 RX ADMIN — POTASSIUM CHLORIDE 40 MEQ: 1500 TABLET, EXTENDED RELEASE ORAL at 03:42

## 2023-02-08 RX ADMIN — ONDANSETRON 4 MG: 2 INJECTION INTRAMUSCULAR; INTRAVENOUS at 10:33

## 2023-02-08 RX ADMIN — TRAZODONE HYDROCHLORIDE 100 MG: 100 TABLET ORAL at 22:20

## 2023-02-08 ASSESSMENT — PAIN SCALES - GENERAL
PAINLEVEL_OUTOF10: 0
PAINLEVEL_OUTOF10: 0

## 2023-02-09 ENCOUNTER — APPOINTMENT (OUTPATIENT)
Dept: NEUROLOGY | Age: 38
End: 2023-02-09

## 2023-02-09 VITALS
HEIGHT: 65 IN | OXYGEN SATURATION: 95 % | RESPIRATION RATE: 18 BRPM | TEMPERATURE: 98.6 F | BODY MASS INDEX: 40.62 KG/M2 | SYSTOLIC BLOOD PRESSURE: 96 MMHG | HEART RATE: 68 BPM | WEIGHT: 243.83 LBS | DIASTOLIC BLOOD PRESSURE: 67 MMHG

## 2023-02-09 LAB
ALBUMIN SERPL-MCNC: 2.8 G/DL (ref 3.6–5.1)
ALBUMIN SERPL-MCNC: 3 G/DL (ref 3.6–5.1)
ALBUMIN/GLOB SERPL: 0.9 {RATIO} (ref 1–2.4)
ALBUMIN/GLOB SERPL: 1 {RATIO} (ref 1–2.4)
ALP SERPL-CCNC: 43 UNITS/L (ref 45–117)
ALP SERPL-CCNC: 47 UNITS/L (ref 45–117)
ALT SERPL-CCNC: 751 UNITS/L
ALT SERPL-CCNC: 760 UNITS/L
ANION GAP SERPL CALC-SCNC: 11 MMOL/L (ref 7–19)
ANION GAP SERPL CALC-SCNC: 13 MMOL/L (ref 7–19)
AST SERPL-CCNC: 263 UNITS/L
AST SERPL-CCNC: 288 UNITS/L
BILIRUB SERPL-MCNC: 0.4 MG/DL (ref 0.2–1)
BILIRUB SERPL-MCNC: 0.4 MG/DL (ref 0.2–1)
BUN SERPL-MCNC: 5 MG/DL (ref 6–20)
BUN SERPL-MCNC: 7 MG/DL (ref 6–20)
BUN/CREAT SERPL: 12 (ref 7–25)
BUN/CREAT SERPL: 8 (ref 7–25)
CALCIUM SERPL-MCNC: 8.4 MG/DL (ref 8.4–10.2)
CALCIUM SERPL-MCNC: 8.7 MG/DL (ref 8.4–10.2)
CHLORIDE SERPL-SCNC: 110 MMOL/L (ref 97–110)
CHLORIDE SERPL-SCNC: 110 MMOL/L (ref 97–110)
CO2 SERPL-SCNC: 22 MMOL/L (ref 21–32)
CO2 SERPL-SCNC: 23 MMOL/L (ref 21–32)
CREAT SERPL-MCNC: 0.59 MG/DL (ref 0.51–0.95)
CREAT SERPL-MCNC: 0.6 MG/DL (ref 0.51–0.95)
DEPRECATED RDW RBC: 34.5 FL (ref 39–50)
ERYTHROCYTE [DISTWIDTH] IN BLOOD: 15.9 % (ref 11–15)
FASTING DURATION TIME PATIENT: ABNORMAL H
FASTING DURATION TIME PATIENT: ABNORMAL H
GFR SERPLBLD BASED ON 1.73 SQ M-ARVRAT: >90 ML/MIN
GFR SERPLBLD BASED ON 1.73 SQ M-ARVRAT: >90 ML/MIN
GLOBULIN SER-MCNC: 3 G/DL (ref 2–4)
GLOBULIN SER-MCNC: 3 G/DL (ref 2–4)
GLUCOSE SERPL-MCNC: 102 MG/DL (ref 70–99)
GLUCOSE SERPL-MCNC: 139 MG/DL (ref 70–99)
HCT VFR BLD CALC: 30 % (ref 36–46.5)
HGB BLD-MCNC: 9.3 G/DL (ref 12–15.5)
INR PPP: 1.4
INR PPP: 1.5
MAGNESIUM SERPL-MCNC: 1.7 MG/DL (ref 1.7–2.4)
MCH RBC QN AUTO: 19.3 PG (ref 26–34)
MCHC RBC AUTO-ENTMCNC: 31 G/DL (ref 32–36.5)
MCV RBC AUTO: 62.2 FL (ref 78–100)
NRBC BLD MANUAL-RTO: 0 /100 WBC
PHOSPHATE SERPL-MCNC: 2.7 MG/DL (ref 2.4–4.7)
PLATELET # BLD AUTO: 275 K/MCL (ref 140–450)
POTASSIUM SERPL-SCNC: 3.3 MMOL/L (ref 3.4–5.1)
POTASSIUM SERPL-SCNC: 3.8 MMOL/L (ref 3.4–5.1)
PROT SERPL-MCNC: 5.8 G/DL (ref 6.4–8.2)
PROT SERPL-MCNC: 6 G/DL (ref 6.4–8.2)
PROTHROMBIN TIME: 14 SEC (ref 9.7–11.8)
PROTHROMBIN TIME: 15.1 SEC (ref 9.7–11.8)
RBC # BLD: 4.82 MIL/MCL (ref 4–5.2)
SODIUM SERPL-SCNC: 140 MMOL/L (ref 135–145)
SODIUM SERPL-SCNC: 142 MMOL/L (ref 135–145)
WBC # BLD: 6.6 K/MCL (ref 4.2–11)

## 2023-02-09 PROCEDURE — 80053 COMPREHEN METABOLIC PANEL: CPT | Performed by: HOSPITALIST

## 2023-02-09 PROCEDURE — 85027 COMPLETE CBC AUTOMATED: CPT | Performed by: INTERNAL MEDICINE

## 2023-02-09 PROCEDURE — 85610 PROTHROMBIN TIME: CPT | Performed by: HOSPITALIST

## 2023-02-09 PROCEDURE — 36415 COLL VENOUS BLD VENIPUNCTURE: CPT | Performed by: INTERNAL MEDICINE

## 2023-02-09 PROCEDURE — 10002807 HB RX 258: Performed by: INTERNAL MEDICINE

## 2023-02-09 PROCEDURE — 10002803 HB RX 637: Performed by: INTERNAL MEDICINE

## 2023-02-09 PROCEDURE — 85610 PROTHROMBIN TIME: CPT | Performed by: INTERNAL MEDICINE

## 2023-02-09 PROCEDURE — 80053 COMPREHEN METABOLIC PANEL: CPT | Performed by: INTERNAL MEDICINE

## 2023-02-09 PROCEDURE — 10002801 HB RX 250 W/O HCPCS: Performed by: INTERNAL MEDICINE

## 2023-02-09 PROCEDURE — 84100 ASSAY OF PHOSPHORUS: CPT | Performed by: INTERNAL MEDICINE

## 2023-02-09 PROCEDURE — 10002803 HB RX 637: Performed by: HOSPITALIST

## 2023-02-09 PROCEDURE — 83735 ASSAY OF MAGNESIUM: CPT | Performed by: INTERNAL MEDICINE

## 2023-02-09 RX ORDER — LANOLIN ALCOHOL/MO/W.PET/CERES
400 CREAM (GRAM) TOPICAL ONCE
Status: COMPLETED | OUTPATIENT
Start: 2023-02-09 | End: 2023-02-09

## 2023-02-09 RX ORDER — LANOLIN ALCOHOL/MO/W.PET/CERES
400 CREAM (GRAM) TOPICAL ONCE
Status: DISCONTINUED | OUTPATIENT
Start: 2023-02-09 | End: 2023-02-09 | Stop reason: HOSPADM

## 2023-02-09 RX ORDER — POTASSIUM CHLORIDE 20 MEQ/1
40 TABLET, EXTENDED RELEASE ORAL ONCE
Status: COMPLETED | OUTPATIENT
Start: 2023-02-09 | End: 2023-02-09

## 2023-02-09 RX ORDER — POTASSIUM CHLORIDE 20 MEQ/1
40 TABLET, EXTENDED RELEASE ORAL ONCE
Status: CANCELLED | OUTPATIENT
Start: 2023-02-09 | End: 2023-02-09

## 2023-02-09 RX ADMIN — GABAPENTIN 600 MG: 300 CAPSULE ORAL at 09:17

## 2023-02-09 RX ADMIN — SODIUM PHOSPHATE, MONOBASIC, MONOHYDRATE AND SODIUM PHOSPHATE, DIBASIC, ANHYDROUS 45 MMOL: 142; 276 INJECTION, SOLUTION INTRAVENOUS at 00:07

## 2023-02-09 RX ADMIN — VORTIOXETINE 20 MG: 10 TABLET, FILM COATED ORAL at 09:18

## 2023-02-09 RX ADMIN — TOPIRAMATE 100 MG: 25 TABLET, FILM COATED ORAL at 09:18

## 2023-02-09 RX ADMIN — GABAPENTIN 600 MG: 300 CAPSULE ORAL at 17:35

## 2023-02-09 RX ADMIN — POTASSIUM CHLORIDE 40 MEQ: 1500 TABLET, EXTENDED RELEASE ORAL at 13:36

## 2023-02-09 RX ADMIN — GABAPENTIN 600 MG: 300 CAPSULE ORAL at 13:36

## 2023-02-09 RX ADMIN — Medication 400 MG: at 15:42

## 2023-02-09 RX ADMIN — PROPRANOLOL HYDROCHLORIDE 60 MG: 60 CAPSULE, EXTENDED RELEASE ORAL at 09:18

## 2023-02-09 RX ADMIN — LAMOTRIGINE 150 MG: 150 TABLET ORAL at 09:18

## 2023-02-09 ASSESSMENT — PAIN SCALES - GENERAL: PAINLEVEL_OUTOF10: 0

## 2023-02-15 ENCOUNTER — E-ADVICE (OUTPATIENT)
Dept: NEUROLOGY | Age: 38
End: 2023-02-15

## 2023-02-16 ENCOUNTER — TELEPHONE (OUTPATIENT)
Dept: NEUROLOGY | Age: 38
End: 2023-02-16

## 2023-02-21 ENCOUNTER — BEHAVIORAL HEALTH (OUTPATIENT)
Dept: BEHAVIORAL HEALTH | Age: 38
End: 2023-02-21

## 2023-02-21 DIAGNOSIS — F41.9 ANXIETY: ICD-10-CM

## 2023-02-21 DIAGNOSIS — R41.840 DIFFICULTY CONCENTRATING: ICD-10-CM

## 2023-02-21 DIAGNOSIS — F51.01 PRIMARY INSOMNIA: ICD-10-CM

## 2023-02-21 DIAGNOSIS — F43.10 PTSD (POST-TRAUMATIC STRESS DISORDER): ICD-10-CM

## 2023-02-21 DIAGNOSIS — F31.32 BIPOLAR AFFECTIVE DISORDER, CURRENTLY DEPRESSED, MODERATE (CMD): Primary | ICD-10-CM

## 2023-02-21 PROCEDURE — 99214 OFFICE O/P EST MOD 30 MIN: CPT | Performed by: PSYCHIATRY & NEUROLOGY

## 2023-02-21 RX ORDER — PRAZOSIN HYDROCHLORIDE 2 MG/1
CAPSULE ORAL
Qty: 150 CAPSULE | Refills: 0 | Status: SHIPPED | OUTPATIENT
Start: 2023-02-21 | End: 2023-03-13 | Stop reason: SDUPTHER

## 2023-02-23 DIAGNOSIS — F41.9 ANXIETY: ICD-10-CM

## 2023-02-23 RX ORDER — HYDROXYZINE 50 MG/1
50 TABLET, FILM COATED ORAL 2 TIMES DAILY PRN
Qty: 60 TABLET | Refills: 0 | OUTPATIENT
Start: 2023-02-23

## 2023-02-23 RX ORDER — HYDROXYZINE 50 MG/1
50 TABLET, FILM COATED ORAL 2 TIMES DAILY PRN
Qty: 40 TABLET | Refills: 0 | Status: SHIPPED | OUTPATIENT
Start: 2023-02-23 | End: 2023-03-13 | Stop reason: SDUPTHER

## 2023-02-25 DIAGNOSIS — F31.32 BIPOLAR AFFECTIVE DISORDER, CURRENTLY DEPRESSED, MODERATE (CMD): ICD-10-CM

## 2023-02-26 ENCOUNTER — HOSPITAL ENCOUNTER (EMERGENCY)
Age: 38
Discharge: HOME | End: 2023-02-26
Attending: STUDENT IN AN ORGANIZED HEALTH CARE EDUCATION/TRAINING PROGRAM

## 2023-02-26 ENCOUNTER — APPOINTMENT (OUTPATIENT)
Dept: ULTRASOUND IMAGING | Age: 38
End: 2023-02-26
Attending: STUDENT IN AN ORGANIZED HEALTH CARE EDUCATION/TRAINING PROGRAM

## 2023-02-26 VITALS
BODY MASS INDEX: 39.25 KG/M2 | TEMPERATURE: 98.1 F | DIASTOLIC BLOOD PRESSURE: 82 MMHG | OXYGEN SATURATION: 98 % | SYSTOLIC BLOOD PRESSURE: 136 MMHG | HEART RATE: 90 BPM | RESPIRATION RATE: 18 BRPM | WEIGHT: 235.89 LBS

## 2023-02-26 DIAGNOSIS — F51.01 PRIMARY INSOMNIA: ICD-10-CM

## 2023-02-26 DIAGNOSIS — R10.11 RIGHT UPPER QUADRANT ABDOMINAL PAIN: Primary | ICD-10-CM

## 2023-02-26 LAB
ALBUMIN SERPL-MCNC: 3.7 G/DL (ref 3.6–5.1)
ALBUMIN/GLOB SERPL: 1.2 {RATIO} (ref 1–2.4)
ALP SERPL-CCNC: 50 UNITS/L (ref 45–117)
ALT SERPL-CCNC: 37 UNITS/L
ANION GAP SERPL CALC-SCNC: 8 MMOL/L (ref 7–19)
AST SERPL-CCNC: 15 UNITS/L
BASOPHILS # BLD: 0.1 K/MCL (ref 0–0.3)
BASOPHILS NFR BLD: 1 %
BILIRUB SERPL-MCNC: 0.4 MG/DL (ref 0.2–1)
BUN SERPL-MCNC: 6 MG/DL (ref 6–20)
BUN/CREAT SERPL: 8 (ref 7–25)
CALCIUM SERPL-MCNC: 9.1 MG/DL (ref 8.4–10.2)
CHLORIDE SERPL-SCNC: 112 MMOL/L (ref 97–110)
CO2 SERPL-SCNC: 23 MMOL/L (ref 21–32)
CREAT SERPL-MCNC: 0.8 MG/DL (ref 0.51–0.95)
DEPRECATED RDW RBC: 34.5 FL (ref 39–50)
EOSINOPHIL # BLD: 0.3 K/MCL (ref 0–0.5)
EOSINOPHIL NFR BLD: 4 %
ERYTHROCYTE [DISTWIDTH] IN BLOOD: 15.8 % (ref 11–15)
FASTING DURATION TIME PATIENT: ABNORMAL H
GFR SERPLBLD BASED ON 1.73 SQ M-ARVRAT: >90 ML/MIN
GLOBULIN SER-MCNC: 3.1 G/DL (ref 2–4)
GLUCOSE SERPL-MCNC: 87 MG/DL (ref 70–99)
HCT VFR BLD CALC: 34.8 % (ref 36–46.5)
HGB BLD-MCNC: 10.8 G/DL (ref 12–15.5)
IMM GRANULOCYTES # BLD AUTO: 0 K/MCL (ref 0–0.2)
IMM GRANULOCYTES # BLD: 0 %
LIPASE SERPL-CCNC: 156 UNITS/L (ref 73–393)
LYMPHOCYTES # BLD: 2.2 K/MCL (ref 1–4.8)
LYMPHOCYTES NFR BLD: 28 %
MCH RBC QN AUTO: 19.5 PG (ref 26–34)
MCHC RBC AUTO-ENTMCNC: 31 G/DL (ref 32–36.5)
MCV RBC AUTO: 62.9 FL (ref 78–100)
MONOCYTES # BLD: 0.3 K/MCL (ref 0.3–0.9)
MONOCYTES NFR BLD: 4 %
NEUTROPHILS # BLD: 4.9 K/MCL (ref 1.8–7.7)
NEUTROPHILS NFR BLD: 63 %
NRBC BLD MANUAL-RTO: 0 /100 WBC
PLATELET # BLD AUTO: 399 K/MCL (ref 140–450)
POTASSIUM SERPL-SCNC: 3.6 MMOL/L (ref 3.4–5.1)
PROT SERPL-MCNC: 6.8 G/DL (ref 6.4–8.2)
RBC # BLD: 5.53 MIL/MCL (ref 4–5.2)
SODIUM SERPL-SCNC: 139 MMOL/L (ref 135–145)
WBC # BLD: 7.8 K/MCL (ref 4.2–11)

## 2023-02-26 PROCEDURE — 76705 ECHO EXAM OF ABDOMEN: CPT

## 2023-02-26 PROCEDURE — 96374 THER/PROPH/DIAG INJ IV PUSH: CPT

## 2023-02-26 PROCEDURE — 99284 EMERGENCY DEPT VISIT MOD MDM: CPT

## 2023-02-26 PROCEDURE — 85025 COMPLETE CBC W/AUTO DIFF WBC: CPT | Performed by: STUDENT IN AN ORGANIZED HEALTH CARE EDUCATION/TRAINING PROGRAM

## 2023-02-26 PROCEDURE — 10002803 HB RX 637: Performed by: STUDENT IN AN ORGANIZED HEALTH CARE EDUCATION/TRAINING PROGRAM

## 2023-02-26 PROCEDURE — 96375 TX/PRO/DX INJ NEW DRUG ADDON: CPT

## 2023-02-26 PROCEDURE — 80053 COMPREHEN METABOLIC PANEL: CPT | Performed by: STUDENT IN AN ORGANIZED HEALTH CARE EDUCATION/TRAINING PROGRAM

## 2023-02-26 PROCEDURE — 83690 ASSAY OF LIPASE: CPT | Performed by: STUDENT IN AN ORGANIZED HEALTH CARE EDUCATION/TRAINING PROGRAM

## 2023-02-26 PROCEDURE — 10002800 HB RX 250 W HCPCS: Performed by: STUDENT IN AN ORGANIZED HEALTH CARE EDUCATION/TRAINING PROGRAM

## 2023-02-26 RX ORDER — ONDANSETRON 2 MG/ML
4 INJECTION INTRAMUSCULAR; INTRAVENOUS ONCE
Status: COMPLETED | OUTPATIENT
Start: 2023-02-26 | End: 2023-02-26

## 2023-02-26 RX ORDER — KETOROLAC TROMETHAMINE 15 MG/ML
15 INJECTION, SOLUTION INTRAMUSCULAR; INTRAVENOUS ONCE
Status: COMPLETED | OUTPATIENT
Start: 2023-02-26 | End: 2023-02-26

## 2023-02-26 RX ORDER — FAMOTIDINE 20 MG/1
40 TABLET, FILM COATED ORAL ONCE
Status: COMPLETED | OUTPATIENT
Start: 2023-02-26 | End: 2023-02-26

## 2023-02-26 RX ADMIN — ONDANSETRON 4 MG: 2 INJECTION INTRAMUSCULAR; INTRAVENOUS at 18:14

## 2023-02-26 RX ADMIN — FAMOTIDINE 40 MG: 20 TABLET, FILM COATED ORAL at 18:14

## 2023-02-26 RX ADMIN — KETOROLAC TROMETHAMINE 15 MG: 15 INJECTION, SOLUTION INTRAMUSCULAR; INTRAVENOUS at 19:41

## 2023-02-26 ASSESSMENT — PAIN SCALES - GENERAL
PAINLEVEL_OUTOF10: 7
PAINLEVEL_OUTOF10: 7

## 2023-02-26 ASSESSMENT — PAIN DESCRIPTION - PAIN TYPE: TYPE: ACUTE PAIN

## 2023-02-27 LAB
RAINBOW EXTRA TUBES HOLD SPECIMEN: NORMAL
RAINBOW EXTRA TUBES HOLD SPECIMEN: NORMAL

## 2023-02-27 RX ORDER — LAMOTRIGINE 150 MG/1
150 TABLET ORAL 2 TIMES DAILY
Qty: 30 TABLET | Refills: 0 | Status: SHIPPED | OUTPATIENT
Start: 2023-02-27 | End: 2023-03-13 | Stop reason: SDUPTHER

## 2023-02-27 RX ORDER — LAMOTRIGINE 150 MG/1
150 TABLET ORAL 2 TIMES DAILY
Qty: 60 TABLET | Refills: 0 | OUTPATIENT
Start: 2023-02-27

## 2023-02-27 RX ORDER — TRAZODONE HYDROCHLORIDE 100 MG/1
100 TABLET ORAL NIGHTLY PRN
Qty: 15 TABLET | Refills: 0 | Status: SHIPPED | OUTPATIENT
Start: 2023-02-27 | End: 2023-03-13 | Stop reason: SDUPTHER

## 2023-03-01 DIAGNOSIS — F31.32 BIPOLAR AFFECTIVE DISORDER, CURRENTLY DEPRESSED, MODERATE (CMD): ICD-10-CM

## 2023-03-01 RX ORDER — VORTIOXETINE 20 MG/1
TABLET, FILM COATED ORAL
Qty: 30 TABLET | Refills: 0 | Status: SHIPPED | OUTPATIENT
Start: 2023-03-01 | End: 2023-03-13 | Stop reason: SDUPTHER

## 2023-03-02 ENCOUNTER — APPOINTMENT (OUTPATIENT)
Dept: NEUROLOGY | Age: 38
End: 2023-03-02

## 2023-03-03 ENCOUNTER — TELEPHONE (OUTPATIENT)
Dept: NEUROLOGY | Age: 38
End: 2023-03-03

## 2023-03-09 ENCOUNTER — APPOINTMENT (OUTPATIENT)
Dept: CT IMAGING | Age: 38
DRG: 812 | End: 2023-03-09
Attending: EMERGENCY MEDICINE

## 2023-03-09 ENCOUNTER — HOSPITAL ENCOUNTER (INPATIENT)
Age: 38
LOS: 3 days | Discharge: HOME | DRG: 812 | End: 2023-03-12
Attending: EMERGENCY MEDICINE | Admitting: INTERNAL MEDICINE

## 2023-03-09 DIAGNOSIS — G44.059 SUNCT (SHORT UNILATERAL NEURALGIFORM HEADACHE, CONJUNCTIVAL INJ/TEAR): ICD-10-CM

## 2023-03-09 DIAGNOSIS — T39.1X4A: Primary | ICD-10-CM

## 2023-03-09 DIAGNOSIS — G43.101 MIGRAINE WITH AURA AND WITH STATUS MIGRAINOSUS, NOT INTRACTABLE: ICD-10-CM

## 2023-03-09 LAB
ALBUMIN SERPL-MCNC: 2.9 G/DL (ref 3.6–5.1)
ALBUMIN SERPL-MCNC: 3.4 G/DL (ref 3.6–5.1)
ALBUMIN/GLOB SERPL: 0.9 {RATIO} (ref 1–2.4)
ALP SERPL-CCNC: 59 UNITS/L (ref 45–117)
ALP SERPL-CCNC: 72 UNITS/L (ref 45–117)
ALT SERPL-CCNC: 2946 UNITS/L
ALT SERPL-CCNC: 3268 UNITS/L
ANION GAP SERPL CALC-SCNC: 12 MMOL/L (ref 7–19)
ANION GAP SERPL CALC-SCNC: 7 MMOL/L (ref 7–19)
APAP SERPL-MCNC: 58 MCG/ML (ref 10–30)
AST SERPL-CCNC: 411 UNITS/L
AST SERPL-CCNC: 565 UNITS/L
BASOPHILS # BLD: 0.1 K/MCL (ref 0–0.3)
BASOPHILS NFR BLD: 1 %
BILIRUB CONJ SERPL-MCNC: 0.4 MG/DL (ref 0–0.2)
BILIRUB SERPL-MCNC: 0.8 MG/DL (ref 0.2–1)
BILIRUB SERPL-MCNC: 0.9 MG/DL (ref 0.2–1)
BUN SERPL-MCNC: 10 MG/DL (ref 6–20)
BUN SERPL-MCNC: 13 MG/DL (ref 6–20)
BUN/CREAT SERPL: 10 (ref 7–25)
BUN/CREAT SERPL: 11 (ref 7–25)
CALCIUM SERPL-MCNC: 8.2 MG/DL (ref 8.4–10.2)
CALCIUM SERPL-MCNC: 8.4 MG/DL (ref 8.4–10.2)
CHLORIDE SERPL-SCNC: 110 MMOL/L (ref 97–110)
CHLORIDE SERPL-SCNC: 113 MMOL/L (ref 97–110)
CO2 SERPL-SCNC: 22 MMOL/L (ref 21–32)
CO2 SERPL-SCNC: 22 MMOL/L (ref 21–32)
CREAT SERPL-MCNC: 0.89 MG/DL (ref 0.51–0.95)
CREAT SERPL-MCNC: 1.26 MG/DL (ref 0.51–0.95)
DEPRECATED RDW RBC: 33.3 FL (ref 39–50)
EOSINOPHIL # BLD: 0.4 K/MCL (ref 0–0.5)
EOSINOPHIL NFR BLD: 8 %
ERYTHROCYTE [DISTWIDTH] IN BLOOD: 16.5 % (ref 11–15)
ETHANOL SERPL-MCNC: NORMAL MG/DL
FASTING DURATION TIME PATIENT: ABNORMAL H
FASTING DURATION TIME PATIENT: ABNORMAL H
GFR SERPLBLD BASED ON 1.73 SQ M-ARVRAT: 56 ML/MIN
GFR SERPLBLD BASED ON 1.73 SQ M-ARVRAT: 86 ML/MIN
GLOBULIN SER-MCNC: 3.3 G/DL (ref 2–4)
GLUCOSE SERPL-MCNC: 106 MG/DL (ref 70–99)
GLUCOSE SERPL-MCNC: 89 MG/DL (ref 70–99)
HCG SERPL QL: NEGATIVE
HCT VFR BLD CALC: 33.1 % (ref 36–46.5)
HGB BLD-MCNC: 10.3 G/DL (ref 12–15.5)
IMM GRANULOCYTES # BLD AUTO: 0 K/MCL (ref 0–0.2)
IMM GRANULOCYTES # BLD: 0 %
INR PPP: 1.2
INR PPP: 1.3
LIPASE SERPL-CCNC: 291 UNITS/L (ref 73–393)
LYMPHOCYTES # BLD: 1.6 K/MCL (ref 1–4.8)
LYMPHOCYTES NFR BLD: 34 %
MCH RBC QN AUTO: 18.9 PG (ref 26–34)
MCHC RBC AUTO-ENTMCNC: 31.1 G/DL (ref 32–36.5)
MCV RBC AUTO: 60.8 FL (ref 78–100)
MONOCYTES # BLD: 0.3 K/MCL (ref 0.3–0.9)
MONOCYTES NFR BLD: 6 %
NEUTROPHILS # BLD: 2.4 K/MCL (ref 1.8–7.7)
NEUTROPHILS NFR BLD: 51 %
NRBC BLD MANUAL-RTO: 0 /100 WBC
PLATELET # BLD AUTO: 299 K/MCL (ref 140–450)
POTASSIUM SERPL-SCNC: 3.4 MMOL/L (ref 3.4–5.1)
POTASSIUM SERPL-SCNC: 4.1 MMOL/L (ref 3.4–5.1)
PROT SERPL-MCNC: 6.2 G/DL (ref 6.4–8.2)
PROT SERPL-MCNC: 6.6 G/DL (ref 6.4–8.2)
PROTHROMBIN TIME: 12 SEC (ref 9.7–11.8)
PROTHROMBIN TIME: 12.9 SEC (ref 9.7–11.8)
RBC # BLD: 5.44 MIL/MCL (ref 4–5.2)
SALICYLATES SERPL-MCNC: <2.8 MG/DL
SODIUM SERPL-SCNC: 136 MMOL/L (ref 135–145)
SODIUM SERPL-SCNC: 143 MMOL/L (ref 135–145)
WBC # BLD: 4.8 K/MCL (ref 4.2–11)

## 2023-03-09 PROCEDURE — 83690 ASSAY OF LIPASE: CPT | Performed by: EMERGENCY MEDICINE

## 2023-03-09 PROCEDURE — 10004651 HB RX, NO CHARGE ITEM: Performed by: EMERGENCY MEDICINE

## 2023-03-09 PROCEDURE — 82077 ASSAY SPEC XCP UR&BREATH IA: CPT | Performed by: EMERGENCY MEDICINE

## 2023-03-09 PROCEDURE — G1004 CDSM NDSC: HCPCS

## 2023-03-09 PROCEDURE — 84703 CHORIONIC GONADOTROPIN ASSAY: CPT | Performed by: EMERGENCY MEDICINE

## 2023-03-09 PROCEDURE — 96374 THER/PROPH/DIAG INJ IV PUSH: CPT

## 2023-03-09 PROCEDURE — 85610 PROTHROMBIN TIME: CPT | Performed by: EMERGENCY MEDICINE

## 2023-03-09 PROCEDURE — 80048 BASIC METABOLIC PNL TOTAL CA: CPT | Performed by: EMERGENCY MEDICINE

## 2023-03-09 PROCEDURE — 96375 TX/PRO/DX INJ NEW DRUG ADDON: CPT

## 2023-03-09 PROCEDURE — 10006031 HB ROOM CHARGE TELEMETRY

## 2023-03-09 PROCEDURE — 74177 CT ABD & PELVIS W/CONTRAST: CPT

## 2023-03-09 PROCEDURE — 80076 HEPATIC FUNCTION PANEL: CPT | Performed by: EMERGENCY MEDICINE

## 2023-03-09 PROCEDURE — 80179 DRUG ASSAY SALICYLATE: CPT | Performed by: EMERGENCY MEDICINE

## 2023-03-09 PROCEDURE — 80143 DRUG ASSAY ACETAMINOPHEN: CPT | Performed by: EMERGENCY MEDICINE

## 2023-03-09 PROCEDURE — 85025 COMPLETE CBC W/AUTO DIFF WBC: CPT | Performed by: EMERGENCY MEDICINE

## 2023-03-09 PROCEDURE — 96361 HYDRATE IV INFUSION ADD-ON: CPT

## 2023-03-09 PROCEDURE — 10002807 HB RX 258: Performed by: EMERGENCY MEDICINE

## 2023-03-09 PROCEDURE — 80053 COMPREHEN METABOLIC PANEL: CPT | Performed by: EMERGENCY MEDICINE

## 2023-03-09 PROCEDURE — 36415 COLL VENOUS BLD VENIPUNCTURE: CPT | Performed by: INTERNAL MEDICINE

## 2023-03-09 PROCEDURE — 99285 EMERGENCY DEPT VISIT HI MDM: CPT

## 2023-03-09 PROCEDURE — 10002800 HB RX 250 W HCPCS: Performed by: EMERGENCY MEDICINE

## 2023-03-09 PROCEDURE — 10002805 HB CONTRAST AGENT: Performed by: EMERGENCY MEDICINE

## 2023-03-09 PROCEDURE — 10002803 HB RX 637: Performed by: INTERNAL MEDICINE

## 2023-03-09 PROCEDURE — 10002800 HB RX 250 W HCPCS: Performed by: INTERNAL MEDICINE

## 2023-03-09 PROCEDURE — 10002807 HB RX 258: Performed by: INTERNAL MEDICINE

## 2023-03-09 RX ORDER — PROPRANOLOL HCL 60 MG
60 CAPSULE, EXTENDED RELEASE 24HR ORAL DAILY
Status: DISCONTINUED | OUTPATIENT
Start: 2023-03-10 | End: 2023-03-12 | Stop reason: HOSPADM

## 2023-03-09 RX ORDER — GABAPENTIN 300 MG/1
600 CAPSULE ORAL EVERY 12 HOURS SCHEDULED
Status: DISCONTINUED | OUTPATIENT
Start: 2023-03-09 | End: 2023-03-12 | Stop reason: HOSPADM

## 2023-03-09 RX ORDER — SODIUM CHLORIDE 9 MG/ML
INJECTION, SOLUTION INTRAVENOUS CONTINUOUS
Status: ACTIVE | OUTPATIENT
Start: 2023-03-09 | End: 2023-03-10

## 2023-03-09 RX ORDER — CLONAZEPAM 0.5 MG/1
0.5 TABLET ORAL 2 TIMES DAILY PRN
COMMUNITY
End: 2023-03-13 | Stop reason: HOSPADM

## 2023-03-09 RX ORDER — DROPERIDOL 2.5 MG/ML
2.5 INJECTION, SOLUTION INTRAMUSCULAR; INTRAVENOUS ONCE
Status: COMPLETED | OUTPATIENT
Start: 2023-03-09 | End: 2023-03-09

## 2023-03-09 RX ORDER — HEPARIN SODIUM 5000 [USP'U]/ML
7500 INJECTION, SOLUTION INTRAVENOUS; SUBCUTANEOUS EVERY 8 HOURS SCHEDULED
Status: DISCONTINUED | OUTPATIENT
Start: 2023-03-09 | End: 2023-03-12 | Stop reason: HOSPADM

## 2023-03-09 RX ORDER — TOPIRAMATE 100 MG/1
100 TABLET, FILM COATED ORAL 2 TIMES DAILY
Status: DISCONTINUED | OUTPATIENT
Start: 2023-03-09 | End: 2023-03-12 | Stop reason: HOSPADM

## 2023-03-09 RX ORDER — LAMOTRIGINE 150 MG/1
150 TABLET ORAL 2 TIMES DAILY
Status: DISCONTINUED | OUTPATIENT
Start: 2023-03-09 | End: 2023-03-12 | Stop reason: HOSPADM

## 2023-03-09 RX ORDER — 0.9 % SODIUM CHLORIDE 0.9 %
2 VIAL (ML) INJECTION EVERY 12 HOURS SCHEDULED
Status: DISCONTINUED | OUTPATIENT
Start: 2023-03-09 | End: 2023-03-12 | Stop reason: HOSPADM

## 2023-03-09 RX ORDER — DIPHENHYDRAMINE HYDROCHLORIDE 50 MG/ML
25 INJECTION INTRAMUSCULAR; INTRAVENOUS ONCE
Status: COMPLETED | OUTPATIENT
Start: 2023-03-09 | End: 2023-03-09

## 2023-03-09 RX ADMIN — IOHEXOL 80 ML: 350 INJECTION, SOLUTION INTRAVENOUS at 18:14

## 2023-03-09 RX ADMIN — GABAPENTIN 600 MG: 300 CAPSULE ORAL at 23:21

## 2023-03-09 RX ADMIN — DROPERIDOL 2.5 MG: 2.5 INJECTION, SOLUTION INTRAMUSCULAR; INTRAVENOUS at 16:34

## 2023-03-09 RX ADMIN — HEPARIN SODIUM 7500 UNITS: 5000 INJECTION INTRAVENOUS; SUBCUTANEOUS at 23:20

## 2023-03-09 RX ADMIN — LAMOTRIGINE 150 MG: 150 TABLET ORAL at 23:21

## 2023-03-09 RX ADMIN — ACETYLCYSTEINE 15000 MG: 200 INJECTION, SOLUTION INTRAVENOUS at 20:05

## 2023-03-09 RX ADMIN — DIPHENHYDRAMINE HYDROCHLORIDE 25 MG: 50 INJECTION, SOLUTION INTRAMUSCULAR; INTRAVENOUS at 16:33

## 2023-03-09 RX ADMIN — TOPIRAMATE 100 MG: 100 TABLET ORAL at 23:21

## 2023-03-09 RX ADMIN — SODIUM CHLORIDE, PRESERVATIVE FREE 2 ML: 5 INJECTION INTRAVENOUS at 23:23

## 2023-03-09 RX ADMIN — ACETYLCYSTEINE 25000 MG: 200 INJECTION, SOLUTION INTRAVENOUS at 21:23

## 2023-03-09 RX ADMIN — SODIUM CHLORIDE 1000 ML: 9 INJECTION, SOLUTION INTRAVENOUS at 16:29

## 2023-03-09 RX ADMIN — SODIUM CHLORIDE: 9 INJECTION, SOLUTION INTRAVENOUS at 23:33

## 2023-03-09 SDOH — SOCIAL STABILITY: SOCIAL NETWORK: SUPPORT SYSTEMS: FAMILY MEMBERS;FRIENDS;SPOUSE

## 2023-03-09 SDOH — ECONOMIC STABILITY: HOUSING INSECURITY: WHAT IS YOUR LIVING SITUATION TODAY?: SPOUSE

## 2023-03-09 SDOH — ECONOMIC STABILITY: HOUSING INSECURITY: WHAT IS YOUR LIVING SITUATION TODAY?: APARTMENT

## 2023-03-09 SDOH — ECONOMIC STABILITY: HOUSING INSECURITY: ARE YOU WORRIED ABOUT LOSING YOUR HOUSING?: NO

## 2023-03-09 SDOH — HEALTH STABILITY: PHYSICAL HEALTH: DO YOU HAVE DIFFICULTY DRESSING OR BATHING?: NO

## 2023-03-09 SDOH — HEALTH STABILITY: GENERAL: BECAUSE OF A PHYSICAL, MENTAL, OR EMOTIONAL CONDITION, DO YOU HAVE DIFFICULTY DOING ERRANDS ALONE?: NO

## 2023-03-09 SDOH — HEALTH STABILITY: PHYSICAL HEALTH: DO YOU HAVE SERIOUS DIFFICULTY WALKING OR CLIMBING STAIRS?: NO

## 2023-03-09 SDOH — ECONOMIC STABILITY: FOOD INSECURITY: HOW OFTEN IN THE PAST 12 MONTHS WERE YOU WORRIED OR STRESSED ABOUT HAVING ENOUGH MONEY TO BUY NUTRITIOUS MEALS?: NEVER

## 2023-03-09 SDOH — ECONOMIC STABILITY: GENERAL

## 2023-03-09 ASSESSMENT — LIFESTYLE VARIABLES
HOW OFTEN DO YOU HAVE A DRINK CONTAINING ALCOHOL: NEVER
ALCOHOL_USE_STATUS: NO OR LOW RISK WITH VALIDATED TOOL
HOW OFTEN DO YOU HAVE 6 OR MORE DRINKS ON ONE OCCASION: NEVER
HOW MANY STANDARD DRINKS CONTAINING ALCOHOL DO YOU HAVE ON A TYPICAL DAY: 0,1 OR 2
AUDIT-C TOTAL SCORE: 0

## 2023-03-09 ASSESSMENT — PAIN SCALES - GENERAL
PAINLEVEL_OUTOF10: 5
PAINLEVEL_OUTOF10: 9

## 2023-03-09 ASSESSMENT — PATIENT HEALTH QUESTIONNAIRE - PHQ9
SUM OF ALL RESPONSES TO PHQ9 QUESTIONS 1 AND 2: 0
SUM OF ALL RESPONSES TO PHQ9 QUESTIONS 1 AND 2: 0
2. FEELING DOWN, DEPRESSED OR HOPELESS: NOT AT ALL
IS PATIENT ABLE TO COMPLETE PHQ2 OR PHQ9: YES
CLINICAL INTERPRETATION OF PHQ2 SCORE: NO FURTHER SCREENING NEEDED
1. LITTLE INTEREST OR PLEASURE IN DOING THINGS: NOT AT ALL

## 2023-03-09 ASSESSMENT — ACTIVITIES OF DAILY LIVING (ADL)
RECENT_DECLINE_ADL: NO
ADL_SHORT_OF_BREATH: YES
ADL_SCORE: 12
ADL_BEFORE_ADMISSION: INDEPENDENT

## 2023-03-09 ASSESSMENT — COLUMBIA-SUICIDE SEVERITY RATING SCALE - C-SSRS
1. WITHIN THE PAST MONTH, HAVE YOU WISHED YOU WERE DEAD OR WISHED YOU COULD GO TO SLEEP AND NOT WAKE UP?: NO
2. HAVE YOU ACTUALLY HAD ANY THOUGHTS OF KILLING YOURSELF?: NO
IS THE PATIENT ABLE TO COMPLETE C-SSRS: YES
HOW LONG AGO DID YOU DO ANY OF THESE?: WITHIN THE LAST THREE MONTHS
6. HAVE YOU EVER DONE ANYTHING, STARTED TO DO ANYTHING, OR PREPARED TO DO ANYTHING TO END YOUR LIFE?: YES

## 2023-03-10 LAB
ALBUMIN SERPL-MCNC: 2.8 G/DL (ref 3.6–5.1)
ALBUMIN SERPL-MCNC: 2.8 G/DL (ref 3.6–5.1)
ALBUMIN/GLOB SERPL: 0.9 {RATIO} (ref 1–2.4)
ALBUMIN/GLOB SERPL: 1 {RATIO} (ref 1–2.4)
ALP SERPL-CCNC: 56 UNITS/L (ref 45–117)
ALP SERPL-CCNC: 57 UNITS/L (ref 45–117)
ALT SERPL-CCNC: 2023 UNITS/L
ALT SERPL-CCNC: 2458 UNITS/L
ANION GAP SERPL CALC-SCNC: 10 MMOL/L (ref 7–19)
ANION GAP SERPL CALC-SCNC: 13 MMOL/L (ref 7–19)
APAP SERPL-MCNC: <2 MCG/ML (ref 10–30)
AST SERPL-CCNC: 202 UNITS/L
AST SERPL-CCNC: 323 UNITS/L
BILIRUB SERPL-MCNC: 1 MG/DL (ref 0.2–1)
BILIRUB SERPL-MCNC: 1 MG/DL (ref 0.2–1)
BUN SERPL-MCNC: 9 MG/DL (ref 6–20)
BUN SERPL-MCNC: 9 MG/DL (ref 6–20)
BUN/CREAT SERPL: 11 (ref 7–25)
BUN/CREAT SERPL: 11 (ref 7–25)
CALCIUM SERPL-MCNC: 8.3 MG/DL (ref 8.4–10.2)
CALCIUM SERPL-MCNC: 8.3 MG/DL (ref 8.4–10.2)
CHLORIDE SERPL-SCNC: 115 MMOL/L (ref 97–110)
CHLORIDE SERPL-SCNC: 117 MMOL/L (ref 97–110)
CO2 SERPL-SCNC: 18 MMOL/L (ref 21–32)
CO2 SERPL-SCNC: 20 MMOL/L (ref 21–32)
CREAT SERPL-MCNC: 0.83 MG/DL (ref 0.51–0.95)
CREAT SERPL-MCNC: 0.84 MG/DL (ref 0.51–0.95)
DEPRECATED RDW RBC: 33.8 FL (ref 39–50)
ERYTHROCYTE [DISTWIDTH] IN BLOOD: 16.6 % (ref 11–15)
FASTING DURATION TIME PATIENT: ABNORMAL H
FASTING DURATION TIME PATIENT: ABNORMAL H
GFR SERPLBLD BASED ON 1.73 SQ M-ARVRAT: >90 ML/MIN
GFR SERPLBLD BASED ON 1.73 SQ M-ARVRAT: >90 ML/MIN
GLOBULIN SER-MCNC: 2.9 G/DL (ref 2–4)
GLOBULIN SER-MCNC: 3 G/DL (ref 2–4)
GLUCOSE SERPL-MCNC: 91 MG/DL (ref 70–99)
GLUCOSE SERPL-MCNC: 93 MG/DL (ref 70–99)
HCT VFR BLD CALC: 32.7 % (ref 36–46.5)
HGB BLD-MCNC: 10.1 G/DL (ref 12–15.5)
INR PPP: 1.2
INR PPP: 1.4
MCH RBC QN AUTO: 19.1 PG (ref 26–34)
MCHC RBC AUTO-ENTMCNC: 30.9 G/DL (ref 32–36.5)
MCV RBC AUTO: 61.7 FL (ref 78–100)
NRBC BLD MANUAL-RTO: 0 /100 WBC
PLATELET # BLD AUTO: 299 K/MCL (ref 140–450)
POTASSIUM SERPL-SCNC: 3.5 MMOL/L (ref 3.4–5.1)
POTASSIUM SERPL-SCNC: 3.8 MMOL/L (ref 3.4–5.1)
PROT SERPL-MCNC: 5.7 G/DL (ref 6.4–8.2)
PROT SERPL-MCNC: 5.8 G/DL (ref 6.4–8.2)
PROTHROMBIN TIME: 12.4 SEC (ref 9.7–11.8)
PROTHROMBIN TIME: 13.8 SEC (ref 9.7–11.8)
RAINBOW EXTRA TUBES HOLD SPECIMEN: NORMAL
RBC # BLD: 5.3 MIL/MCL (ref 4–5.2)
SODIUM SERPL-SCNC: 142 MMOL/L (ref 135–145)
SODIUM SERPL-SCNC: 143 MMOL/L (ref 135–145)
WBC # BLD: 5.1 K/MCL (ref 4.2–11)

## 2023-03-10 PROCEDURE — 80143 DRUG ASSAY ACETAMINOPHEN: CPT | Performed by: HOSPITALIST

## 2023-03-10 PROCEDURE — 80053 COMPREHEN METABOLIC PANEL: CPT | Performed by: INTERNAL MEDICINE

## 2023-03-10 PROCEDURE — 36415 COLL VENOUS BLD VENIPUNCTURE: CPT | Performed by: INTERNAL MEDICINE

## 2023-03-10 PROCEDURE — 85610 PROTHROMBIN TIME: CPT | Performed by: EMERGENCY MEDICINE

## 2023-03-10 PROCEDURE — 10002800 HB RX 250 W HCPCS: Performed by: HOSPITALIST

## 2023-03-10 PROCEDURE — 80053 COMPREHEN METABOLIC PANEL: CPT | Performed by: HOSPITALIST

## 2023-03-10 PROCEDURE — 10002803 HB RX 637: Performed by: INTERNAL MEDICINE

## 2023-03-10 PROCEDURE — 10002803 HB RX 637: Performed by: HOSPITALIST

## 2023-03-10 PROCEDURE — 10004651 HB RX, NO CHARGE ITEM: Performed by: EMERGENCY MEDICINE

## 2023-03-10 PROCEDURE — 10002800 HB RX 250 W HCPCS: Performed by: INTERNAL MEDICINE

## 2023-03-10 PROCEDURE — 85027 COMPLETE CBC AUTOMATED: CPT | Performed by: INTERNAL MEDICINE

## 2023-03-10 PROCEDURE — 10006031 HB ROOM CHARGE TELEMETRY

## 2023-03-10 RX ORDER — ONDANSETRON 2 MG/ML
4 INJECTION INTRAMUSCULAR; INTRAVENOUS EVERY 6 HOURS PRN
Status: DISCONTINUED | OUTPATIENT
Start: 2023-03-10 | End: 2023-03-12 | Stop reason: HOSPADM

## 2023-03-10 RX ORDER — KETOROLAC TROMETHAMINE 30 MG/ML
15 INJECTION, SOLUTION INTRAMUSCULAR; INTRAVENOUS EVERY 6 HOURS PRN
Status: DISCONTINUED | OUTPATIENT
Start: 2023-03-10 | End: 2023-03-12 | Stop reason: HOSPADM

## 2023-03-10 RX ORDER — PRAZOSIN HYDROCHLORIDE 1 MG/1
2 CAPSULE ORAL EVERY 12 HOURS SCHEDULED
Status: DISCONTINUED | OUTPATIENT
Start: 2023-03-10 | End: 2023-03-12 | Stop reason: HOSPADM

## 2023-03-10 RX ORDER — CLONAZEPAM 0.5 MG/1
0.5 TABLET ORAL 2 TIMES DAILY PRN
Status: DISCONTINUED | OUTPATIENT
Start: 2023-03-10 | End: 2023-03-12 | Stop reason: HOSPADM

## 2023-03-10 RX ADMIN — SODIUM CHLORIDE, PRESERVATIVE FREE 2 ML: 5 INJECTION INTRAVENOUS at 20:53

## 2023-03-10 RX ADMIN — PROPRANOLOL HYDROCHLORIDE 60 MG: 60 CAPSULE, EXTENDED RELEASE ORAL at 08:22

## 2023-03-10 RX ADMIN — CLONAZEPAM 0.5 MG: 0.5 TABLET ORAL at 20:52

## 2023-03-10 RX ADMIN — TOPIRAMATE 100 MG: 100 TABLET ORAL at 20:48

## 2023-03-10 RX ADMIN — TOPIRAMATE 100 MG: 100 TABLET ORAL at 08:22

## 2023-03-10 RX ADMIN — VORTIOXETINE 20 MG: 10 TABLET, FILM COATED ORAL at 08:22

## 2023-03-10 RX ADMIN — PRAZOSIN HYDROCHLORIDE 2 MG: 1 CAPSULE ORAL at 20:48

## 2023-03-10 RX ADMIN — GABAPENTIN 600 MG: 300 CAPSULE ORAL at 08:22

## 2023-03-10 RX ADMIN — LAMOTRIGINE 150 MG: 150 TABLET ORAL at 08:22

## 2023-03-10 RX ADMIN — LAMOTRIGINE 150 MG: 150 TABLET ORAL at 20:48

## 2023-03-10 RX ADMIN — GABAPENTIN 600 MG: 300 CAPSULE ORAL at 20:48

## 2023-03-10 RX ADMIN — HEPARIN SODIUM 7500 UNITS: 5000 INJECTION INTRAVENOUS; SUBCUTANEOUS at 05:46

## 2023-03-10 RX ADMIN — KETOROLAC TROMETHAMINE 15 MG: 30 INJECTION, SOLUTION INTRAMUSCULAR at 15:32

## 2023-03-10 ASSESSMENT — ENCOUNTER SYMPTOMS
CONFUSION: 0
CHEST TIGHTNESS: 0
COUGH: 0
ENDOCRINE NEGATIVE: 1
ALLERGIC/IMMUNOLOGIC NEGATIVE: 1
ABDOMINAL DISTENTION: 0
NAUSEA: 0
EYES NEGATIVE: 1
ABDOMINAL PAIN: 1
HEADACHES: 0
ADENOPATHY: 0
DIZZINESS: 0
CONSTITUTIONAL NEGATIVE: 1
SINUS PAIN: 0

## 2023-03-10 ASSESSMENT — PAIN SCALES - GENERAL
PAINLEVEL_OUTOF10: 0
PAINLEVEL_OUTOF10: 0
PAINLEVEL_OUTOF10: 8
PAINLEVEL_OUTOF10: 3

## 2023-03-10 ASSESSMENT — COGNITIVE AND FUNCTIONAL STATUS - GENERAL
DO YOU HAVE SERIOUS DIFFICULTY WALKING OR CLIMBING STAIRS: NO
DO YOU HAVE DIFFICULTY DRESSING OR BATHING: NO
BECAUSE OF A PHYSICAL, MENTAL, OR EMOTIONAL CONDITION, DO YOU HAVE DIFFICULTY DOING ERRANDS ALONE: NO
BECAUSE OF A PHYSICAL, MENTAL, OR EMOTIONAL CONDITION, DO YOU HAVE SERIOUS DIFFICULTY CONCENTRATING, REMEMBERING OR MAKING DECISIONS: NO

## 2023-03-11 LAB
ALBUMIN SERPL-MCNC: 2.8 G/DL (ref 3.6–5.1)
ALBUMIN/GLOB SERPL: 1 {RATIO} (ref 1–2.4)
ALP SERPL-CCNC: 54 UNITS/L (ref 45–117)
ALT SERPL-CCNC: 1610 UNITS/L
ANION GAP SERPL CALC-SCNC: 9 MMOL/L (ref 7–19)
AST SERPL-CCNC: 129 UNITS/L
BILIRUB SERPL-MCNC: 0.9 MG/DL (ref 0.2–1)
BUN SERPL-MCNC: 8 MG/DL (ref 6–20)
BUN/CREAT SERPL: 10 (ref 7–25)
CALCIUM SERPL-MCNC: 8.6 MG/DL (ref 8.4–10.2)
CHLORIDE SERPL-SCNC: 117 MMOL/L (ref 97–110)
CO2 SERPL-SCNC: 21 MMOL/L (ref 21–32)
CREAT SERPL-MCNC: 0.83 MG/DL (ref 0.51–0.95)
FASTING DURATION TIME PATIENT: ABNORMAL H
GFR SERPLBLD BASED ON 1.73 SQ M-ARVRAT: >90 ML/MIN
GLOBULIN SER-MCNC: 2.7 G/DL (ref 2–4)
GLUCOSE SERPL-MCNC: 84 MG/DL (ref 70–99)
INR PPP: 1.2
POTASSIUM SERPL-SCNC: 3.6 MMOL/L (ref 3.4–5.1)
PROT SERPL-MCNC: 5.5 G/DL (ref 6.4–8.2)
PROTHROMBIN TIME: 12 SEC (ref 9.7–11.8)
RAINBOW EXTRA TUBES HOLD SPECIMEN: NORMAL
SODIUM SERPL-SCNC: 143 MMOL/L (ref 135–145)

## 2023-03-11 PROCEDURE — 10002800 HB RX 250 W HCPCS: Performed by: HOSPITALIST

## 2023-03-11 PROCEDURE — 10002803 HB RX 637: Performed by: INTERNAL MEDICINE

## 2023-03-11 PROCEDURE — 85610 PROTHROMBIN TIME: CPT | Performed by: EMERGENCY MEDICINE

## 2023-03-11 PROCEDURE — 36415 COLL VENOUS BLD VENIPUNCTURE: CPT | Performed by: HOSPITALIST

## 2023-03-11 PROCEDURE — 99221 1ST HOSP IP/OBS SF/LOW 40: CPT | Performed by: PSYCHIATRY & NEUROLOGY

## 2023-03-11 PROCEDURE — 10006031 HB ROOM CHARGE TELEMETRY

## 2023-03-11 PROCEDURE — 10004651 HB RX, NO CHARGE ITEM: Performed by: EMERGENCY MEDICINE

## 2023-03-11 PROCEDURE — 10002803 HB RX 637: Performed by: HOSPITALIST

## 2023-03-11 PROCEDURE — 80053 COMPREHEN METABOLIC PANEL: CPT | Performed by: HOSPITALIST

## 2023-03-11 RX ADMIN — TOPIRAMATE 100 MG: 100 TABLET ORAL at 08:50

## 2023-03-11 RX ADMIN — VORTIOXETINE 20 MG: 10 TABLET, FILM COATED ORAL at 08:50

## 2023-03-11 RX ADMIN — CLONAZEPAM 0.5 MG: 0.5 TABLET ORAL at 11:52

## 2023-03-11 RX ADMIN — KETOROLAC TROMETHAMINE 15 MG: 30 INJECTION, SOLUTION INTRAMUSCULAR at 08:49

## 2023-03-11 RX ADMIN — ONDANSETRON 4 MG: 2 INJECTION INTRAMUSCULAR; INTRAVENOUS at 15:11

## 2023-03-11 RX ADMIN — TOPIRAMATE 100 MG: 100 TABLET ORAL at 20:48

## 2023-03-11 RX ADMIN — LAMOTRIGINE 150 MG: 150 TABLET ORAL at 20:48

## 2023-03-11 RX ADMIN — SODIUM CHLORIDE, PRESERVATIVE FREE 2 ML: 5 INJECTION INTRAVENOUS at 08:50

## 2023-03-11 RX ADMIN — GABAPENTIN 600 MG: 300 CAPSULE ORAL at 20:48

## 2023-03-11 RX ADMIN — PROPRANOLOL HYDROCHLORIDE 60 MG: 60 CAPSULE, EXTENDED RELEASE ORAL at 08:50

## 2023-03-11 RX ADMIN — GABAPENTIN 600 MG: 300 CAPSULE ORAL at 08:50

## 2023-03-11 RX ADMIN — LAMOTRIGINE 150 MG: 150 TABLET ORAL at 08:50

## 2023-03-11 RX ADMIN — ONDANSETRON 4 MG: 2 INJECTION INTRAMUSCULAR; INTRAVENOUS at 20:47

## 2023-03-11 RX ADMIN — PRAZOSIN HYDROCHLORIDE 2 MG: 1 CAPSULE ORAL at 20:48

## 2023-03-11 RX ADMIN — SODIUM CHLORIDE, PRESERVATIVE FREE 2 ML: 5 INJECTION INTRAVENOUS at 20:48

## 2023-03-11 RX ADMIN — PRAZOSIN HYDROCHLORIDE 2 MG: 1 CAPSULE ORAL at 08:50

## 2023-03-11 ASSESSMENT — PAIN SCALES - GENERAL
PAINLEVEL_OUTOF10: 5
PAINLEVEL_OUTOF10: 7
PAINLEVEL_OUTOF10: 0

## 2023-03-12 VITALS
WEIGHT: 242.06 LBS | HEIGHT: 65 IN | TEMPERATURE: 97.9 F | SYSTOLIC BLOOD PRESSURE: 98 MMHG | RESPIRATION RATE: 18 BRPM | OXYGEN SATURATION: 96 % | BODY MASS INDEX: 40.33 KG/M2 | HEART RATE: 68 BPM | DIASTOLIC BLOOD PRESSURE: 67 MMHG

## 2023-03-12 LAB
ALBUMIN SERPL-MCNC: 2.9 G/DL (ref 3.6–5.1)
ALBUMIN/GLOB SERPL: 1 {RATIO} (ref 1–2.4)
ALP SERPL-CCNC: 51 UNITS/L (ref 45–117)
ALT SERPL-CCNC: 1190 UNITS/L
ANION GAP SERPL CALC-SCNC: 7 MMOL/L (ref 7–19)
AST SERPL-CCNC: 75 UNITS/L
BILIRUB SERPL-MCNC: 0.7 MG/DL (ref 0.2–1)
BUN SERPL-MCNC: 14 MG/DL (ref 6–20)
BUN/CREAT SERPL: 15 (ref 7–25)
CALCIUM SERPL-MCNC: 8.8 MG/DL (ref 8.4–10.2)
CHLORIDE SERPL-SCNC: 115 MMOL/L (ref 97–110)
CO2 SERPL-SCNC: 24 MMOL/L (ref 21–32)
CREAT SERPL-MCNC: 0.92 MG/DL (ref 0.51–0.95)
FASTING DURATION TIME PATIENT: ABNORMAL H
GFR SERPLBLD BASED ON 1.73 SQ M-ARVRAT: 82 ML/MIN
GLOBULIN SER-MCNC: 2.8 G/DL (ref 2–4)
GLUCOSE SERPL-MCNC: 88 MG/DL (ref 70–99)
POTASSIUM SERPL-SCNC: 4 MMOL/L (ref 3.4–5.1)
PROT SERPL-MCNC: 5.7 G/DL (ref 6.4–8.2)
RAINBOW EXTRA TUBES HOLD SPECIMEN: NORMAL
SODIUM SERPL-SCNC: 142 MMOL/L (ref 135–145)

## 2023-03-12 PROCEDURE — 10002803 HB RX 637: Performed by: HOSPITALIST

## 2023-03-12 PROCEDURE — 99233 SBSQ HOSP IP/OBS HIGH 50: CPT | Performed by: PSYCHIATRY & NEUROLOGY

## 2023-03-12 PROCEDURE — 10002803 HB RX 637: Performed by: INTERNAL MEDICINE

## 2023-03-12 PROCEDURE — 80053 COMPREHEN METABOLIC PANEL: CPT | Performed by: HOSPITALIST

## 2023-03-12 PROCEDURE — 36415 COLL VENOUS BLD VENIPUNCTURE: CPT | Performed by: HOSPITALIST

## 2023-03-12 PROCEDURE — 10004651 HB RX, NO CHARGE ITEM: Performed by: EMERGENCY MEDICINE

## 2023-03-12 PROCEDURE — 10002800 HB RX 250 W HCPCS: Performed by: HOSPITALIST

## 2023-03-12 RX ORDER — GABAPENTIN 300 MG/1
600 CAPSULE ORAL 3 TIMES DAILY
Status: SHIPPED | COMMUNITY
Start: 2023-03-12 | End: 2023-03-16 | Stop reason: SDUPTHER

## 2023-03-12 RX ADMIN — VORTIOXETINE 20 MG: 10 TABLET, FILM COATED ORAL at 09:43

## 2023-03-12 RX ADMIN — TOPIRAMATE 100 MG: 100 TABLET ORAL at 09:43

## 2023-03-12 RX ADMIN — PRAZOSIN HYDROCHLORIDE 2 MG: 1 CAPSULE ORAL at 09:43

## 2023-03-12 RX ADMIN — SODIUM CHLORIDE, PRESERVATIVE FREE 2 ML: 5 INJECTION INTRAVENOUS at 09:43

## 2023-03-12 RX ADMIN — ONDANSETRON 4 MG: 2 INJECTION INTRAMUSCULAR; INTRAVENOUS at 09:43

## 2023-03-12 RX ADMIN — GABAPENTIN 600 MG: 300 CAPSULE ORAL at 09:43

## 2023-03-12 RX ADMIN — LAMOTRIGINE 150 MG: 150 TABLET ORAL at 09:43

## 2023-03-12 RX ADMIN — PROPRANOLOL HYDROCHLORIDE 60 MG: 60 CAPSULE, EXTENDED RELEASE ORAL at 09:43

## 2023-03-12 ASSESSMENT — PAIN SCALES - GENERAL: PAINLEVEL_OUTOF10: 0

## 2023-03-13 ENCOUNTER — BEHAVIORAL HEALTH (OUTPATIENT)
Dept: BEHAVIORAL HEALTH | Age: 38
End: 2023-03-13

## 2023-03-13 DIAGNOSIS — F41.9 ANXIETY: ICD-10-CM

## 2023-03-13 DIAGNOSIS — F43.10 PTSD (POST-TRAUMATIC STRESS DISORDER): ICD-10-CM

## 2023-03-13 DIAGNOSIS — F51.01 PRIMARY INSOMNIA: ICD-10-CM

## 2023-03-13 DIAGNOSIS — F31.32 BIPOLAR AFFECTIVE DISORDER, CURRENTLY DEPRESSED, MODERATE (CMD): Primary | ICD-10-CM

## 2023-03-13 PROCEDURE — 99214 OFFICE O/P EST MOD 30 MIN: CPT | Performed by: PSYCHIATRY & NEUROLOGY

## 2023-03-13 RX ORDER — PRAZOSIN HYDROCHLORIDE 2 MG/1
CAPSULE ORAL
Qty: 150 CAPSULE | Refills: 0 | Status: SHIPPED | OUTPATIENT
Start: 2023-03-13 | End: 2023-04-26 | Stop reason: SDUPTHER

## 2023-03-13 RX ORDER — LAMOTRIGINE 150 MG/1
150 TABLET ORAL 2 TIMES DAILY
Qty: 60 TABLET | Refills: 0 | Status: SHIPPED | OUTPATIENT
Start: 2023-03-13 | End: 2023-04-26 | Stop reason: SDUPTHER

## 2023-03-13 RX ORDER — TRAZODONE HYDROCHLORIDE 100 MG/1
100 TABLET ORAL NIGHTLY PRN
Qty: 30 TABLET | Refills: 0 | Status: SHIPPED | OUTPATIENT
Start: 2023-03-13 | End: 2023-04-26 | Stop reason: SDUPTHER

## 2023-03-13 RX ORDER — HYDROXYZINE 50 MG/1
50 TABLET, FILM COATED ORAL 2 TIMES DAILY PRN
Qty: 60 TABLET | Refills: 0 | Status: SHIPPED | OUTPATIENT
Start: 2023-03-13 | End: 2023-04-12 | Stop reason: SDUPTHER

## 2023-03-14 ENCOUNTER — TELEPHONE (OUTPATIENT)
Dept: NEUROLOGY | Age: 38
End: 2023-03-14

## 2023-03-16 ENCOUNTER — OFFICE VISIT (OUTPATIENT)
Dept: NEUROLOGY | Age: 38
End: 2023-03-16

## 2023-03-16 VITALS
SYSTOLIC BLOOD PRESSURE: 128 MMHG | DIASTOLIC BLOOD PRESSURE: 79 MMHG | BODY MASS INDEX: 40.04 KG/M2 | HEART RATE: 68 BPM | WEIGHT: 240.3 LBS | HEIGHT: 65 IN | OXYGEN SATURATION: 97 %

## 2023-03-16 DIAGNOSIS — G44.059 SUNCT (SHORT UNILATERAL NEURALGIFORM HEADACHE, CONJUNCTIVAL INJ/TEAR): ICD-10-CM

## 2023-03-16 DIAGNOSIS — F44.5 PSYCHOGENIC NONEPILEPTIC SEIZURE: Primary | ICD-10-CM

## 2023-03-16 DIAGNOSIS — G43.101 MIGRAINE WITH AURA AND WITH STATUS MIGRAINOSUS, NOT INTRACTABLE: Chronic | ICD-10-CM

## 2023-03-16 PROCEDURE — 99215 OFFICE O/P EST HI 40 MIN: CPT | Performed by: PSYCHIATRY & NEUROLOGY

## 2023-03-16 RX ORDER — CLONAZEPAM 0.5 MG/1
0.5 TABLET ORAL PRN
COMMUNITY
End: 2023-05-02 | Stop reason: ALTCHOICE

## 2023-03-16 RX ORDER — GABAPENTIN 400 MG/1
400 CAPSULE ORAL 3 TIMES DAILY
Qty: 180 CAPSULE | Refills: 3 | Status: SHIPPED | OUTPATIENT
Start: 2023-03-16 | End: 2023-06-29 | Stop reason: SDUPTHER

## 2023-03-16 RX ORDER — HYDROXYZINE 50 MG/1
1 TABLET, FILM COATED ORAL
COMMUNITY
Start: 2023-02-23 | End: 2023-03-16 | Stop reason: SDUPTHER

## 2023-03-16 ASSESSMENT — PAIN SCALES - GENERAL: PAINLEVEL: 6

## 2023-03-17 DIAGNOSIS — G43.101 MIGRAINE WITH AURA AND WITH STATUS MIGRAINOSUS, NOT INTRACTABLE: ICD-10-CM

## 2023-03-17 RX ORDER — CEFUROXIME AXETIL 250 MG/1
TABLET ORAL
Qty: 6 ML | Refills: 1 | Status: SHIPPED | OUTPATIENT
Start: 2023-03-17 | End: 2023-08-17 | Stop reason: ALTCHOICE

## 2023-03-26 ENCOUNTER — E-ADVICE (OUTPATIENT)
Dept: NEUROLOGY | Age: 38
End: 2023-03-26

## 2023-03-28 ENCOUNTER — TELEPHONE (OUTPATIENT)
Dept: NEUROLOGY | Age: 38
End: 2023-03-28

## 2023-03-28 RX ORDER — CLONAZEPAM 0.5 MG/1
TABLET ORAL
Qty: 30 TABLET | Refills: 0 | OUTPATIENT
Start: 2023-03-28

## 2023-04-04 ENCOUNTER — APPOINTMENT (OUTPATIENT)
Dept: BEHAVIORAL HEALTH | Age: 38
End: 2023-04-04

## 2023-04-12 DIAGNOSIS — F41.9 ANXIETY: ICD-10-CM

## 2023-04-12 RX ORDER — HYDROXYZINE 50 MG/1
50 TABLET, FILM COATED ORAL 2 TIMES DAILY PRN
Qty: 16 TABLET | Refills: 0 | Status: SHIPPED | OUTPATIENT
Start: 2023-04-12 | End: 2023-05-09 | Stop reason: SDUPTHER

## 2023-04-19 ENCOUNTER — TELEPHONE (OUTPATIENT)
Dept: BEHAVIORAL HEALTH | Age: 38
End: 2023-04-19

## 2023-04-19 ENCOUNTER — HOSPITAL ENCOUNTER (EMERGENCY)
Age: 38
Discharge: HOME | End: 2023-04-19
Attending: EMERGENCY MEDICINE

## 2023-04-19 ENCOUNTER — APPOINTMENT (OUTPATIENT)
Dept: CT IMAGING | Age: 38
End: 2023-04-19
Attending: EMERGENCY MEDICINE

## 2023-04-19 VITALS
WEIGHT: 229.28 LBS | TEMPERATURE: 98.6 F | SYSTOLIC BLOOD PRESSURE: 126 MMHG | HEIGHT: 65 IN | RESPIRATION RATE: 16 BRPM | DIASTOLIC BLOOD PRESSURE: 79 MMHG | OXYGEN SATURATION: 96 % | BODY MASS INDEX: 38.2 KG/M2 | HEART RATE: 56 BPM

## 2023-04-19 DIAGNOSIS — R10.9 CHRONIC ABDOMINAL PAIN: ICD-10-CM

## 2023-04-19 DIAGNOSIS — R53.1 WEAKNESS: Primary | ICD-10-CM

## 2023-04-19 DIAGNOSIS — R55 SYNCOPE, UNSPECIFIED SYNCOPE TYPE: ICD-10-CM

## 2023-04-19 DIAGNOSIS — G89.29 CHRONIC ABDOMINAL PAIN: ICD-10-CM

## 2023-04-19 LAB
ALBUMIN SERPL-MCNC: 3.9 G/DL (ref 3.6–5.1)
ALBUMIN/GLOB SERPL: 1.1 {RATIO} (ref 1–2.4)
ALP SERPL-CCNC: 53 UNITS/L (ref 45–117)
ALT SERPL-CCNC: 22 UNITS/L
ANION GAP SERPL CALC-SCNC: 9 MMOL/L (ref 7–19)
AST SERPL-CCNC: 23 UNITS/L
ATRIAL RATE (BPM): 54
BASOPHILS # BLD: 0.1 K/MCL (ref 0–0.3)
BASOPHILS NFR BLD: 1 %
BILIRUB SERPL-MCNC: 0.8 MG/DL (ref 0.2–1)
BUN SERPL-MCNC: 6 MG/DL (ref 6–20)
BUN/CREAT SERPL: 7 (ref 7–25)
CALCIUM SERPL-MCNC: 9.3 MG/DL (ref 8.4–10.2)
CHLORIDE SERPL-SCNC: 108 MMOL/L (ref 97–110)
CO2 SERPL-SCNC: 23 MMOL/L (ref 21–32)
CREAT SERPL-MCNC: 0.85 MG/DL (ref 0.51–0.95)
DEPRECATED RDW RBC: 37.4 FL (ref 39–50)
EOSINOPHIL # BLD: 0.2 K/MCL (ref 0–0.5)
EOSINOPHIL NFR BLD: 3 %
ERYTHROCYTE [DISTWIDTH] IN BLOOD: 17.2 % (ref 11–15)
FASTING DURATION TIME PATIENT: NORMAL H
GFR SERPLBLD BASED ON 1.73 SQ M-ARVRAT: 90 ML/MIN
GLOBULIN SER-MCNC: 3.6 G/DL (ref 2–4)
GLUCOSE SERPL-MCNC: 98 MG/DL (ref 70–99)
HCG SERPL-ACNC: <2 MUNITS/ML
HCT VFR BLD CALC: 35.2 % (ref 36–46.5)
HGB BLD-MCNC: 11 G/DL (ref 12–15.5)
IMM GRANULOCYTES # BLD AUTO: 0 K/MCL (ref 0–0.2)
IMM GRANULOCYTES # BLD: 0 %
LIPASE SERPL-CCNC: 137 UNITS/L (ref 73–393)
LYMPHOCYTES # BLD: 1.9 K/MCL (ref 1–4.8)
LYMPHOCYTES NFR BLD: 23 %
MCH RBC QN AUTO: 19.8 PG (ref 26–34)
MCHC RBC AUTO-ENTMCNC: 31.3 G/DL (ref 32–36.5)
MCV RBC AUTO: 63.4 FL (ref 78–100)
MONOCYTES # BLD: 0.4 K/MCL (ref 0.3–0.9)
MONOCYTES NFR BLD: 5 %
NEUTROPHILS # BLD: 5.7 K/MCL (ref 1.8–7.7)
NEUTROPHILS NFR BLD: 68 %
NRBC BLD MANUAL-RTO: 0 /100 WBC
P AXIS (DEGREES): 25
PLATELET # BLD AUTO: 332 K/MCL (ref 140–450)
POTASSIUM SERPL-SCNC: 3.4 MMOL/L (ref 3.4–5.1)
PR-INTERVAL (MSEC): 150
PROT SERPL-MCNC: 7.5 G/DL (ref 6.4–8.2)
QRS-INTERVAL (MSEC): 86
QT-INTERVAL (MSEC): 490
QTC: 464
R AXIS (DEGREES): 52
RAINBOW EXTRA TUBES HOLD SPECIMEN: NORMAL
RBC # BLD: 5.55 MIL/MCL (ref 4–5.2)
REPORT TEXT: NORMAL
SODIUM SERPL-SCNC: 137 MMOL/L (ref 135–145)
T AXIS (DEGREES): 64
TROPONIN I SERPL DL<=0.01 NG/ML-MCNC: 5 NG/L
TSH SERPL-ACNC: 1.62 MCUNITS/ML (ref 0.35–5)
VENTRICULAR RATE EKG/MIN (BPM): 54
WBC # BLD: 8.3 K/MCL (ref 4.2–11)

## 2023-04-19 PROCEDURE — 85025 COMPLETE CBC W/AUTO DIFF WBC: CPT | Performed by: EMERGENCY MEDICINE

## 2023-04-19 PROCEDURE — 84443 ASSAY THYROID STIM HORMONE: CPT | Performed by: EMERGENCY MEDICINE

## 2023-04-19 PROCEDURE — 80053 COMPREHEN METABOLIC PANEL: CPT | Performed by: EMERGENCY MEDICINE

## 2023-04-19 PROCEDURE — 70450 CT HEAD/BRAIN W/O DYE: CPT

## 2023-04-19 PROCEDURE — 83690 ASSAY OF LIPASE: CPT | Performed by: EMERGENCY MEDICINE

## 2023-04-19 PROCEDURE — 99285 EMERGENCY DEPT VISIT HI MDM: CPT

## 2023-04-19 PROCEDURE — 96360 HYDRATION IV INFUSION INIT: CPT

## 2023-04-19 PROCEDURE — 93005 ELECTROCARDIOGRAM TRACING: CPT | Performed by: EMERGENCY MEDICINE

## 2023-04-19 PROCEDURE — 10002807 HB RX 258: Performed by: EMERGENCY MEDICINE

## 2023-04-19 PROCEDURE — 84702 CHORIONIC GONADOTROPIN TEST: CPT | Performed by: EMERGENCY MEDICINE

## 2023-04-19 PROCEDURE — 84484 ASSAY OF TROPONIN QUANT: CPT | Performed by: EMERGENCY MEDICINE

## 2023-04-19 RX ORDER — LORAZEPAM 2 MG/ML
INJECTION INTRAMUSCULAR
Status: DISCONTINUED
Start: 2023-04-19 | End: 2023-04-19 | Stop reason: HOSPADM

## 2023-04-19 RX ADMIN — SODIUM CHLORIDE 1000 ML: 9 INJECTION, SOLUTION INTRAVENOUS at 10:38

## 2023-04-19 ASSESSMENT — ENCOUNTER SYMPTOMS
COUGH: 0
DIZZINESS: 0
ADENOPATHY: 0
ABDOMINAL PAIN: 0
BACK PAIN: 0
FEVER: 0

## 2023-04-19 ASSESSMENT — PAIN SCALES - GENERAL: PAINLEVEL_OUTOF10: 7

## 2023-04-19 ASSESSMENT — PAIN DESCRIPTION - PAIN TYPE: TYPE: CHRONIC PAIN

## 2023-04-24 DIAGNOSIS — F41.9 ANXIETY: ICD-10-CM

## 2023-04-24 DIAGNOSIS — F31.32 BIPOLAR AFFECTIVE DISORDER, CURRENTLY DEPRESSED, MODERATE (CMD): ICD-10-CM

## 2023-04-24 RX ORDER — HYDROXYZINE 50 MG/1
50 TABLET, FILM COATED ORAL 2 TIMES DAILY PRN
Qty: 16 TABLET | Refills: 0 | OUTPATIENT
Start: 2023-04-24

## 2023-04-24 RX ORDER — LAMOTRIGINE 150 MG/1
150 TABLET ORAL 2 TIMES DAILY
Qty: 60 TABLET | Refills: 0 | OUTPATIENT
Start: 2023-04-24

## 2023-04-25 ENCOUNTER — TELEPHONE (OUTPATIENT)
Dept: BEHAVIORAL HEALTH | Age: 38
End: 2023-04-25

## 2023-04-26 ENCOUNTER — E-ADVICE (OUTPATIENT)
Dept: BEHAVIORAL HEALTH | Age: 38
End: 2023-04-26

## 2023-04-26 DIAGNOSIS — F51.01 PRIMARY INSOMNIA: ICD-10-CM

## 2023-04-26 DIAGNOSIS — F43.10 PTSD (POST-TRAUMATIC STRESS DISORDER): ICD-10-CM

## 2023-04-26 DIAGNOSIS — F31.32 BIPOLAR AFFECTIVE DISORDER, CURRENTLY DEPRESSED, MODERATE (CMD): ICD-10-CM

## 2023-04-26 RX ORDER — PRAZOSIN HYDROCHLORIDE 2 MG/1
CAPSULE ORAL
Qty: 35 CAPSULE | Refills: 0 | Status: SHIPPED | OUTPATIENT
Start: 2023-04-26 | End: 2023-05-02 | Stop reason: SDUPTHER

## 2023-04-26 RX ORDER — TRAZODONE HYDROCHLORIDE 100 MG/1
100 TABLET ORAL NIGHTLY PRN
Qty: 7 TABLET | Refills: 0 | Status: SHIPPED | OUTPATIENT
Start: 2023-04-26 | End: 2023-05-02 | Stop reason: SDUPTHER

## 2023-04-26 RX ORDER — LAMOTRIGINE 150 MG/1
150 TABLET ORAL 2 TIMES DAILY
Qty: 14 TABLET | Refills: 0 | Status: SHIPPED | OUTPATIENT
Start: 2023-04-26 | End: 2023-05-02 | Stop reason: SDUPTHER

## 2023-05-02 ENCOUNTER — BEHAVIORAL HEALTH (OUTPATIENT)
Dept: BEHAVIORAL HEALTH | Age: 38
End: 2023-05-02

## 2023-05-02 DIAGNOSIS — F31.32 BIPOLAR AFFECTIVE DISORDER, CURRENTLY DEPRESSED, MODERATE (CMD): Primary | ICD-10-CM

## 2023-05-02 DIAGNOSIS — F41.9 ANXIETY: ICD-10-CM

## 2023-05-02 DIAGNOSIS — F51.01 PRIMARY INSOMNIA: ICD-10-CM

## 2023-05-02 DIAGNOSIS — F43.10 PTSD (POST-TRAUMATIC STRESS DISORDER): ICD-10-CM

## 2023-05-02 PROCEDURE — 99214 OFFICE O/P EST MOD 30 MIN: CPT | Performed by: PSYCHIATRY & NEUROLOGY

## 2023-05-02 RX ORDER — BUSPIRONE HYDROCHLORIDE 5 MG/1
5 TABLET ORAL 2 TIMES DAILY
Qty: 60 TABLET | Refills: 0 | Status: SHIPPED | OUTPATIENT
Start: 2023-05-02 | End: 2023-05-09 | Stop reason: ALTCHOICE

## 2023-05-02 RX ORDER — LAMOTRIGINE 150 MG/1
150 TABLET ORAL 2 TIMES DAILY
Qty: 60 TABLET | Refills: 0 | Status: SHIPPED | OUTPATIENT
Start: 2023-05-02 | End: 2023-05-30 | Stop reason: SDUPTHER

## 2023-05-02 RX ORDER — PRAZOSIN HYDROCHLORIDE 2 MG/1
CAPSULE ORAL
Qty: 150 CAPSULE | Refills: 0 | Status: SHIPPED | OUTPATIENT
Start: 2023-05-02 | End: 2023-05-03 | Stop reason: SDUPTHER

## 2023-05-02 RX ORDER — TRAZODONE HYDROCHLORIDE 100 MG/1
100 TABLET ORAL NIGHTLY PRN
Qty: 30 TABLET | Refills: 0 | Status: SHIPPED | OUTPATIENT
Start: 2023-05-02 | End: 2023-05-30 | Stop reason: SDUPTHER

## 2023-05-03 DIAGNOSIS — F43.10 PTSD (POST-TRAUMATIC STRESS DISORDER): ICD-10-CM

## 2023-05-03 RX ORDER — PRAZOSIN HYDROCHLORIDE 2 MG/1
CAPSULE ORAL
Qty: 150 CAPSULE | Refills: 0 | Status: SHIPPED | OUTPATIENT
Start: 2023-05-03 | End: 2023-05-30 | Stop reason: SDUPTHER

## 2023-05-05 ENCOUNTER — E-ADVICE (OUTPATIENT)
Dept: NEUROLOGY | Age: 38
End: 2023-05-05

## 2023-05-08 ENCOUNTER — E-ADVICE (OUTPATIENT)
Dept: BEHAVIORAL HEALTH | Age: 38
End: 2023-05-08

## 2023-05-08 DIAGNOSIS — F41.9 ANXIETY: ICD-10-CM

## 2023-05-09 RX ORDER — HYDROXYZINE 50 MG/1
50 TABLET, FILM COATED ORAL 2 TIMES DAILY PRN
Qty: 60 TABLET | Refills: 0 | Status: SHIPPED | OUTPATIENT
Start: 2023-05-09 | End: 2023-05-30 | Stop reason: SDUPTHER

## 2023-05-10 ENCOUNTER — EXTERNAL RECORD (OUTPATIENT)
Dept: HEALTH INFORMATION MANAGEMENT | Facility: OTHER | Age: 38
End: 2023-05-10

## 2023-05-26 DIAGNOSIS — G43.101 MIGRAINE WITH AURA AND WITH STATUS MIGRAINOSUS, NOT INTRACTABLE: Primary | ICD-10-CM

## 2023-05-26 DIAGNOSIS — F41.9 ANXIETY: ICD-10-CM

## 2023-05-26 RX ORDER — CLONAZEPAM 0.5 MG/1
0.5 TABLET ORAL DAILY PRN
Qty: 60 TABLET | Refills: 0 | OUTPATIENT
Start: 2023-05-26

## 2023-05-30 ENCOUNTER — BEHAVIORAL HEALTH (OUTPATIENT)
Dept: BEHAVIORAL HEALTH | Age: 38
End: 2023-05-30

## 2023-05-30 DIAGNOSIS — F41.9 ANXIETY: ICD-10-CM

## 2023-05-30 DIAGNOSIS — F31.32 BIPOLAR AFFECTIVE DISORDER, CURRENTLY DEPRESSED, MODERATE (CMD): Primary | ICD-10-CM

## 2023-05-30 DIAGNOSIS — F43.10 PTSD (POST-TRAUMATIC STRESS DISORDER): ICD-10-CM

## 2023-05-30 DIAGNOSIS — F51.01 PRIMARY INSOMNIA: ICD-10-CM

## 2023-05-30 PROCEDURE — 99214 OFFICE O/P EST MOD 30 MIN: CPT | Performed by: PSYCHIATRY & NEUROLOGY

## 2023-05-30 RX ORDER — TRAZODONE HYDROCHLORIDE 100 MG/1
100 TABLET ORAL NIGHTLY PRN
Qty: 30 TABLET | Refills: 0 | Status: SHIPPED | OUTPATIENT
Start: 2023-05-30 | End: 2023-07-06 | Stop reason: SDUPTHER

## 2023-05-30 RX ORDER — PROPRANOLOL HCL 60 MG
CAPSULE, EXTENDED RELEASE 24HR ORAL DAILY
Qty: 90 CAPSULE | Refills: 3 | Status: SHIPPED | OUTPATIENT
Start: 2023-05-30

## 2023-05-30 RX ORDER — PRAZOSIN HYDROCHLORIDE 2 MG/1
CAPSULE ORAL
Qty: 150 CAPSULE | Refills: 0 | Status: SHIPPED | OUTPATIENT
Start: 2023-05-30 | End: 2023-07-06 | Stop reason: SDUPTHER

## 2023-05-30 RX ORDER — LAMOTRIGINE 150 MG/1
150 TABLET ORAL 2 TIMES DAILY
Qty: 60 TABLET | Refills: 0 | Status: SHIPPED | OUTPATIENT
Start: 2023-05-30 | End: 2023-07-03

## 2023-05-30 RX ORDER — HYDROXYZINE 50 MG/1
50 TABLET, FILM COATED ORAL 2 TIMES DAILY PRN
Qty: 60 TABLET | Refills: 0 | Status: SHIPPED | OUTPATIENT
Start: 2023-05-30 | End: 2023-07-03 | Stop reason: SDUPTHER

## 2023-06-21 DIAGNOSIS — F41.9 ANXIETY: ICD-10-CM

## 2023-06-21 RX ORDER — CLONAZEPAM 0.5 MG/1
0.25 TABLET ORAL 3 TIMES DAILY PRN
Qty: 45 TABLET | Refills: 0 | OUTPATIENT
Start: 2023-06-21

## 2023-06-22 ENCOUNTER — TELEPHONE (OUTPATIENT)
Dept: NEUROLOGY | Age: 38
End: 2023-06-22

## 2023-06-22 DIAGNOSIS — G43.101 MIGRAINE WITH AURA AND WITH STATUS MIGRAINOSUS, NOT INTRACTABLE: Primary | ICD-10-CM

## 2023-06-22 RX ORDER — TOPIRAMATE 100 MG/1
TABLET, FILM COATED ORAL
Qty: 60 TABLET | Refills: 11 | Status: SHIPPED | OUTPATIENT
Start: 2023-06-22 | End: 2023-06-29 | Stop reason: SDUPTHER

## 2023-06-26 ENCOUNTER — HOSPITAL ENCOUNTER (EMERGENCY)
Age: 38
Discharge: LEFT BEFORE DISCHARGE | End: 2023-06-26

## 2023-06-26 VITALS
DIASTOLIC BLOOD PRESSURE: 70 MMHG | RESPIRATION RATE: 16 BRPM | OXYGEN SATURATION: 98 % | TEMPERATURE: 98.1 F | SYSTOLIC BLOOD PRESSURE: 105 MMHG | HEART RATE: 69 BPM

## 2023-06-29 ENCOUNTER — OFFICE VISIT (OUTPATIENT)
Dept: NEUROLOGY | Age: 38
End: 2023-06-29

## 2023-06-29 VITALS
OXYGEN SATURATION: 97 % | HEIGHT: 65 IN | BODY MASS INDEX: 36.73 KG/M2 | SYSTOLIC BLOOD PRESSURE: 91 MMHG | HEART RATE: 66 BPM | DIASTOLIC BLOOD PRESSURE: 58 MMHG | WEIGHT: 220.46 LBS

## 2023-06-29 DIAGNOSIS — G43.101 MIGRAINE WITH AURA AND WITH STATUS MIGRAINOSUS, NOT INTRACTABLE: Chronic | ICD-10-CM

## 2023-06-29 DIAGNOSIS — G44.059 SUNCT (SHORT UNILATERAL NEURALGIFORM HEADACHE, CONJUNCTIVAL INJ/TEAR): ICD-10-CM

## 2023-06-29 DIAGNOSIS — M79.7 FIBROMYALGIA SYNDROME: ICD-10-CM

## 2023-06-29 DIAGNOSIS — F44.5 PSYCHOGENIC NONEPILEPTIC SEIZURE: Primary | ICD-10-CM

## 2023-06-29 PROCEDURE — 99215 OFFICE O/P EST HI 40 MIN: CPT | Performed by: PSYCHIATRY & NEUROLOGY

## 2023-06-29 RX ORDER — TOPIRAMATE 100 MG/1
100 TABLET, FILM COATED ORAL 2 TIMES DAILY
Qty: 180 TABLET | Refills: 3 | Status: SHIPPED | OUTPATIENT
Start: 2023-06-29 | End: 2023-08-17 | Stop reason: SDUPTHER

## 2023-06-29 RX ORDER — DULOXETIN HYDROCHLORIDE 20 MG/1
20 CAPSULE, DELAYED RELEASE ORAL DAILY
COMMUNITY
Start: 2023-06-25 | End: 2023-10-24 | Stop reason: SINTOL

## 2023-06-29 RX ORDER — GABAPENTIN 400 MG/1
400 CAPSULE ORAL 4 TIMES DAILY
Qty: 360 CAPSULE | Refills: 3 | Status: SHIPPED | OUTPATIENT
Start: 2023-06-29 | End: 2024-06-28

## 2023-06-29 ASSESSMENT — PAIN SCALES - GENERAL: PAINLEVEL: 6

## 2023-07-03 DIAGNOSIS — F41.9 ANXIETY: ICD-10-CM

## 2023-07-03 DIAGNOSIS — F31.32 BIPOLAR AFFECTIVE DISORDER, CURRENTLY DEPRESSED, MODERATE (CMD): ICD-10-CM

## 2023-07-03 RX ORDER — LAMOTRIGINE 150 MG/1
150 TABLET ORAL 2 TIMES DAILY
Qty: 8 TABLET | Refills: 0 | Status: SHIPPED | OUTPATIENT
Start: 2023-07-03 | End: 2023-07-06 | Stop reason: SDUPTHER

## 2023-07-03 RX ORDER — HYDROXYZINE 50 MG/1
50 TABLET, FILM COATED ORAL 2 TIMES DAILY PRN
Qty: 8 TABLET | Refills: 0 | Status: SHIPPED | OUTPATIENT
Start: 2023-07-03 | End: 2023-07-06 | Stop reason: SDUPTHER

## 2023-07-05 DIAGNOSIS — F51.01 PRIMARY INSOMNIA: ICD-10-CM

## 2023-07-05 RX ORDER — TRAZODONE HYDROCHLORIDE 100 MG/1
100 TABLET ORAL NIGHTLY PRN
Qty: 30 TABLET | Refills: 0 | OUTPATIENT
Start: 2023-07-05

## 2023-07-06 ENCOUNTER — BEHAVIORAL HEALTH (OUTPATIENT)
Dept: BEHAVIORAL HEALTH | Age: 38
End: 2023-07-06

## 2023-07-06 DIAGNOSIS — F31.32 BIPOLAR AFFECTIVE DISORDER, CURRENTLY DEPRESSED, MODERATE (CMD): Primary | ICD-10-CM

## 2023-07-06 DIAGNOSIS — F43.10 PTSD (POST-TRAUMATIC STRESS DISORDER): ICD-10-CM

## 2023-07-06 DIAGNOSIS — F51.01 PRIMARY INSOMNIA: ICD-10-CM

## 2023-07-06 DIAGNOSIS — F41.9 ANXIETY: ICD-10-CM

## 2023-07-06 PROCEDURE — 99214 OFFICE O/P EST MOD 30 MIN: CPT | Performed by: PSYCHIATRY & NEUROLOGY

## 2023-07-06 RX ORDER — PRAZOSIN HYDROCHLORIDE 2 MG/1
CAPSULE ORAL
Qty: 150 CAPSULE | Refills: 0 | Status: SHIPPED | OUTPATIENT
Start: 2023-07-06 | End: 2023-08-08 | Stop reason: SDUPTHER

## 2023-07-06 RX ORDER — LAMOTRIGINE 150 MG/1
150 TABLET ORAL 2 TIMES DAILY
Qty: 60 TABLET | Refills: 0 | Status: SHIPPED | OUTPATIENT
Start: 2023-07-06 | End: 2023-08-04

## 2023-07-06 RX ORDER — HYDROXYZINE 50 MG/1
50 TABLET, FILM COATED ORAL 2 TIMES DAILY PRN
Qty: 60 TABLET | Refills: 0 | Status: SHIPPED | OUTPATIENT
Start: 2023-07-06 | End: 2023-08-08 | Stop reason: SDUPTHER

## 2023-07-06 RX ORDER — TRAZODONE HYDROCHLORIDE 100 MG/1
100 TABLET ORAL NIGHTLY PRN
Qty: 30 TABLET | Refills: 0 | Status: SHIPPED | OUTPATIENT
Start: 2023-07-06 | End: 2023-08-04

## 2023-07-24 ENCOUNTER — APPOINTMENT (OUTPATIENT)
Dept: ULTRASOUND IMAGING | Age: 38
End: 2023-07-24
Attending: EMERGENCY MEDICINE

## 2023-07-24 ENCOUNTER — HOSPITAL ENCOUNTER (EMERGENCY)
Age: 38
Discharge: HOME | End: 2023-07-24
Attending: EMERGENCY MEDICINE

## 2023-07-24 ENCOUNTER — APPOINTMENT (OUTPATIENT)
Dept: CT IMAGING | Age: 38
End: 2023-07-24
Attending: EMERGENCY MEDICINE

## 2023-07-24 VITALS
DIASTOLIC BLOOD PRESSURE: 94 MMHG | TEMPERATURE: 98 F | BODY MASS INDEX: 37.84 KG/M2 | HEIGHT: 64 IN | HEART RATE: 74 BPM | RESPIRATION RATE: 18 BRPM | OXYGEN SATURATION: 98 % | SYSTOLIC BLOOD PRESSURE: 145 MMHG

## 2023-07-24 DIAGNOSIS — R10.13 EPIGASTRIC PAIN: Primary | ICD-10-CM

## 2023-07-24 LAB
ALBUMIN SERPL-MCNC: 3.5 G/DL (ref 3.6–5.1)
ALBUMIN/GLOB SERPL: 0.9 {RATIO} (ref 1–2.4)
ALP SERPL-CCNC: 54 UNITS/L (ref 45–117)
ALT SERPL-CCNC: 24 UNITS/L
ANION GAP SERPL CALC-SCNC: 11 MMOL/L (ref 7–19)
APPEARANCE UR: CLEAR
AST SERPL-CCNC: 37 UNITS/L
BACTERIA #/AREA URNS HPF: ABNORMAL /HPF
BASOPHILS # BLD: 0.1 K/MCL (ref 0–0.3)
BASOPHILS NFR BLD: 1 %
BILIRUB SERPL-MCNC: 0.8 MG/DL (ref 0.2–1)
BILIRUB UR QL STRIP: NEGATIVE
BUN SERPL-MCNC: 4 MG/DL (ref 6–20)
BUN/CREAT SERPL: 6 (ref 7–25)
CALCIUM SERPL-MCNC: 9 MG/DL (ref 8.4–10.2)
CHLORIDE SERPL-SCNC: 110 MMOL/L (ref 97–110)
CO2 SERPL-SCNC: 19 MMOL/L (ref 21–32)
COLOR UR: ABNORMAL
CREAT SERPL-MCNC: 0.71 MG/DL (ref 0.51–0.95)
DEPRECATED RDW RBC: 34.3 FL (ref 39–50)
EOSINOPHIL # BLD: 0.1 K/MCL (ref 0–0.5)
EOSINOPHIL NFR BLD: 2 %
ERYTHROCYTE [DISTWIDTH] IN BLOOD: 17.6 % (ref 11–15)
FASTING DURATION TIME PATIENT: ABNORMAL H
GFR SERPLBLD BASED ON 1.73 SQ M-ARVRAT: >90 ML/MIN
GLOBULIN SER-MCNC: 3.8 G/DL (ref 2–4)
GLUCOSE SERPL-MCNC: 91 MG/DL (ref 70–99)
GLUCOSE UR STRIP-MCNC: NEGATIVE MG/DL
HCG UR QL: NEGATIVE
HCT VFR BLD CALC: 35.1 % (ref 36–46.5)
HGB BLD-MCNC: 10.7 G/DL (ref 12–15.5)
HGB UR QL STRIP: ABNORMAL
HYALINE CASTS #/AREA URNS LPF: ABNORMAL /LPF
IMM GRANULOCYTES # BLD AUTO: 0 K/MCL (ref 0–0.2)
IMM GRANULOCYTES # BLD: 1 %
KETONES UR STRIP-MCNC: 15 MG/DL
LEUKOCYTE ESTERASE UR QL STRIP: NEGATIVE
LIPASE SERPL-CCNC: 83 UNITS/L (ref 73–393)
LYMPHOCYTES # BLD: 1.7 K/MCL (ref 1–4.8)
LYMPHOCYTES NFR BLD: 22 %
MCH RBC QN AUTO: 18.7 PG (ref 26–34)
MCHC RBC AUTO-ENTMCNC: 30.5 G/DL (ref 32–36.5)
MCV RBC AUTO: 61.4 FL (ref 78–100)
MONOCYTES # BLD: 0.4 K/MCL (ref 0.3–0.9)
MONOCYTES NFR BLD: 5 %
MUCOUS THREADS URNS QL MICRO: PRESENT
NEUTROPHILS # BLD: 5.5 K/MCL (ref 1.8–7.7)
NEUTROPHILS NFR BLD: 69 %
NITRITE UR QL STRIP: NEGATIVE
NRBC BLD MANUAL-RTO: 0 /100 WBC
PH UR STRIP: 6 [PH] (ref 5–7)
PLATELET # BLD AUTO: 301 K/MCL (ref 140–450)
POTASSIUM SERPL-SCNC: 4.1 MMOL/L (ref 3.4–5.1)
PROT SERPL-MCNC: 7.3 G/DL (ref 6.4–8.2)
PROT UR STRIP-MCNC: NEGATIVE MG/DL
RBC # BLD: 5.72 MIL/MCL (ref 4–5.2)
RBC #/AREA URNS HPF: ABNORMAL /HPF
SODIUM SERPL-SCNC: 136 MMOL/L (ref 135–145)
SP GR UR STRIP: 1.01 (ref 1–1.03)
SQUAMOUS #/AREA URNS HPF: ABNORMAL /HPF
UROBILINOGEN UR STRIP-MCNC: 0.2 MG/DL
WBC # BLD: 7.9 K/MCL (ref 4.2–11)
WBC #/AREA URNS HPF: ABNORMAL /HPF

## 2023-07-24 PROCEDURE — 36415 COLL VENOUS BLD VENIPUNCTURE: CPT | Performed by: PHYSICIAN ASSISTANT

## 2023-07-24 PROCEDURE — 10002801 HB RX 250 W/O HCPCS: Performed by: EMERGENCY MEDICINE

## 2023-07-24 PROCEDURE — 96374 THER/PROPH/DIAG INJ IV PUSH: CPT

## 2023-07-24 PROCEDURE — 10002805 HB CONTRAST AGENT: Performed by: EMERGENCY MEDICINE

## 2023-07-24 PROCEDURE — 99284 EMERGENCY DEPT VISIT MOD MDM: CPT

## 2023-07-24 PROCEDURE — 10002803 HB RX 637: Performed by: EMERGENCY MEDICINE

## 2023-07-24 PROCEDURE — 10002800 HB RX 250 W HCPCS: Performed by: EMERGENCY MEDICINE

## 2023-07-24 PROCEDURE — 81001 URINALYSIS AUTO W/SCOPE: CPT | Performed by: EMERGENCY MEDICINE

## 2023-07-24 PROCEDURE — 96375 TX/PRO/DX INJ NEW DRUG ADDON: CPT

## 2023-07-24 PROCEDURE — 76705 ECHO EXAM OF ABDOMEN: CPT

## 2023-07-24 PROCEDURE — 74177 CT ABD & PELVIS W/CONTRAST: CPT

## 2023-07-24 PROCEDURE — 96361 HYDRATE IV INFUSION ADD-ON: CPT

## 2023-07-24 PROCEDURE — G1004 CDSM NDSC: HCPCS

## 2023-07-24 PROCEDURE — 84703 CHORIONIC GONADOTROPIN ASSAY: CPT

## 2023-07-24 PROCEDURE — 83690 ASSAY OF LIPASE: CPT | Performed by: PHYSICIAN ASSISTANT

## 2023-07-24 PROCEDURE — 80053 COMPREHEN METABOLIC PANEL: CPT | Performed by: PHYSICIAN ASSISTANT

## 2023-07-24 PROCEDURE — 85025 COMPLETE CBC W/AUTO DIFF WBC: CPT | Performed by: PHYSICIAN ASSISTANT

## 2023-07-24 PROCEDURE — 10002807 HB RX 258: Performed by: EMERGENCY MEDICINE

## 2023-07-24 RX ORDER — FREMANEZUMAB-VFRM 225 MG/1.5ML
INJECTION SUBCUTANEOUS
COMMUNITY
End: 2023-08-17 | Stop reason: ALTCHOICE

## 2023-07-24 RX ORDER — FAMOTIDINE 10 MG/ML
20 INJECTION, SOLUTION INTRAVENOUS ONCE
Status: COMPLETED | OUTPATIENT
Start: 2023-07-24 | End: 2023-07-24

## 2023-07-24 RX ORDER — HYDROCODONE BITARTRATE AND ACETAMINOPHEN 5; 325 MG/1; MG/1
1 TABLET ORAL EVERY 6 HOURS PRN
Qty: 8 TABLET | Refills: 0 | Status: SHIPPED | OUTPATIENT
Start: 2023-07-24 | End: 2023-08-17 | Stop reason: ALTCHOICE

## 2023-07-24 RX ORDER — MAGNESIUM HYDROXIDE/ALUMINUM HYDROXICE/SIMETHICONE 120; 1200; 1200 MG/30ML; MG/30ML; MG/30ML
30 SUSPENSION ORAL ONCE
Status: COMPLETED | OUTPATIENT
Start: 2023-07-24 | End: 2023-07-24

## 2023-07-24 RX ORDER — FAMOTIDINE 20 MG/1
20 TABLET, FILM COATED ORAL 2 TIMES DAILY
Qty: 60 TABLET | Refills: 0 | Status: SHIPPED | OUTPATIENT
Start: 2023-07-24 | End: 2023-08-17 | Stop reason: ALTCHOICE

## 2023-07-24 RX ORDER — ONDANSETRON 2 MG/ML
4 INJECTION INTRAMUSCULAR; INTRAVENOUS ONCE
Status: COMPLETED | OUTPATIENT
Start: 2023-07-24 | End: 2023-07-24

## 2023-07-24 RX ORDER — DOCUSATE SODIUM 100 MG/1
100 CAPSULE, LIQUID FILLED ORAL 2 TIMES DAILY PRN
Qty: 20 CAPSULE | Refills: 0 | Status: SHIPPED | OUTPATIENT
Start: 2023-07-24 | End: 2023-10-24 | Stop reason: ALTCHOICE

## 2023-07-24 RX ADMIN — SODIUM CHLORIDE 1000 ML: 9 INJECTION, SOLUTION INTRAVENOUS at 18:06

## 2023-07-24 RX ADMIN — ONDANSETRON 4 MG: 2 INJECTION INTRAMUSCULAR; INTRAVENOUS at 18:05

## 2023-07-24 RX ADMIN — FENTANYL CITRATE 50 MCG: 0.05 INJECTION, SOLUTION INTRAMUSCULAR; INTRAVENOUS at 19:45

## 2023-07-24 RX ADMIN — FAMOTIDINE 20 MG: 10 INJECTION INTRAVENOUS at 18:05

## 2023-07-24 RX ADMIN — ALUMINUM HYDROXIDE, MAGNESIUM HYDROXIDE, SIMETHICONE 30 ML: 400; 400; 40 SUSPENSION ORAL at 18:05

## 2023-07-24 RX ADMIN — IOHEXOL 75 ML: 350 INJECTION, SOLUTION INTRAVENOUS at 19:13

## 2023-07-24 ASSESSMENT — PAIN DESCRIPTION - PAIN TYPE: TYPE: CHRONIC PAIN

## 2023-07-24 ASSESSMENT — PAIN SCALES - GENERAL: PAINLEVEL_OUTOF10: 8

## 2023-07-25 LAB
RAINBOW EXTRA TUBES HOLD SPECIMEN: NORMAL

## 2023-07-26 RX ORDER — GABAPENTIN 400 MG/1
400 CAPSULE ORAL 4 TIMES DAILY
COMMUNITY

## 2023-07-26 RX ORDER — TOPIRAMATE 100 MG/1
100 TABLET, FILM COATED ORAL 2 TIMES DAILY
COMMUNITY

## 2023-07-26 RX ORDER — LAMOTRIGINE 150 MG/1
150 TABLET ORAL 2 TIMES DAILY
COMMUNITY

## 2023-07-27 ENCOUNTER — HOSPITAL ENCOUNTER (OUTPATIENT)
Facility: HOSPITAL | Age: 38
Setting detail: HOSPITAL OUTPATIENT SURGERY
Discharge: HOME | End: 2023-07-27
Attending: INTERNAL MEDICINE | Admitting: INTERNAL MEDICINE
Payer: MEDICAID

## 2023-07-27 ENCOUNTER — ANESTHESIA EVENT (OUTPATIENT)
Dept: ENDOSCOPY | Facility: HOSPITAL | Age: 38
End: 2023-07-27
Payer: MEDICAID

## 2023-07-27 ENCOUNTER — ANESTHESIA (OUTPATIENT)
Dept: ENDOSCOPY | Facility: HOSPITAL | Age: 38
End: 2023-07-27
Payer: MEDICAID

## 2023-07-27 VITALS
RESPIRATION RATE: 17 BRPM | TEMPERATURE: 99 F | BODY MASS INDEX: 35.99 KG/M2 | HEART RATE: 78 BPM | WEIGHT: 216 LBS | OXYGEN SATURATION: 94 % | SYSTOLIC BLOOD PRESSURE: 107 MMHG | DIASTOLIC BLOOD PRESSURE: 73 MMHG | HEIGHT: 65 IN

## 2023-07-27 DIAGNOSIS — D50.9 IRON DEFICIENCY ANEMIA: ICD-10-CM

## 2023-07-27 LAB — B-HCG UR QL: NEGATIVE

## 2023-07-27 PROCEDURE — 0DB78ZX EXCISION OF STOMACH, PYLORUS, VIA NATURAL OR ARTIFICIAL OPENING ENDOSCOPIC, DIAGNOSTIC: ICD-10-PCS | Performed by: INTERNAL MEDICINE

## 2023-07-27 PROCEDURE — 81025 URINE PREGNANCY TEST: CPT

## 2023-07-27 PROCEDURE — 88305 TISSUE EXAM BY PATHOLOGIST: CPT | Performed by: INTERNAL MEDICINE

## 2023-07-27 PROCEDURE — 88312 SPECIAL STAINS GROUP 1: CPT | Performed by: INTERNAL MEDICINE

## 2023-07-27 RX ORDER — SODIUM CHLORIDE, SODIUM LACTATE, POTASSIUM CHLORIDE, CALCIUM CHLORIDE 600; 310; 30; 20 MG/100ML; MG/100ML; MG/100ML; MG/100ML
INJECTION, SOLUTION INTRAVENOUS CONTINUOUS
Status: DISCONTINUED | OUTPATIENT
Start: 2023-07-27 | End: 2023-07-27

## 2023-07-27 RX ORDER — PANTOPRAZOLE SODIUM 20 MG/1
TABLET, DELAYED RELEASE ORAL
Qty: 120 TABLET | Refills: 0 | Status: SHIPPED | OUTPATIENT
Start: 2023-07-27

## 2023-07-27 RX ORDER — NALOXONE HYDROCHLORIDE 0.4 MG/ML
80 INJECTION, SOLUTION INTRAMUSCULAR; INTRAVENOUS; SUBCUTANEOUS AS NEEDED
Status: DISCONTINUED | OUTPATIENT
Start: 2023-07-27 | End: 2023-07-27

## 2023-07-27 RX ORDER — LIDOCAINE HYDROCHLORIDE 10 MG/ML
INJECTION, SOLUTION EPIDURAL; INFILTRATION; INTRACAUDAL; PERINEURAL AS NEEDED
Status: DISCONTINUED | OUTPATIENT
Start: 2023-07-27 | End: 2023-07-27 | Stop reason: SURG

## 2023-07-27 RX ADMIN — LIDOCAINE HYDROCHLORIDE 50 MG: 10 INJECTION, SOLUTION EPIDURAL; INFILTRATION; INTRACAUDAL; PERINEURAL at 13:47:00

## 2023-07-27 RX ADMIN — SODIUM CHLORIDE, SODIUM LACTATE, POTASSIUM CHLORIDE, CALCIUM CHLORIDE: 600; 310; 30; 20 INJECTION, SOLUTION INTRAVENOUS at 13:54:00

## 2023-07-27 NOTE — H&P
[address] [place] [address]-Gastroenterology    Stephen Lorenzo is a 40year old female with a PMH of anxiety/depression, ptsd/borderline, thalassemia, + tob use presenting for endoscopy      History of obstructive sleep apnea: No  History of respiratory illness/home oxygen supplementation: No  History of cardiac illness/pacemaker/AICD/blood thinners: No  History of anxiety/depression: YES  History of chronic narcotic pain medication use: No  History of diabetes: No  History of mobility issues: No  History of C-Diff Colitis:No  History of MRSA: No  History of abdominal surgeries:  none     ENDOSCOPIC REPORTS:   Colonoscopy:2017 - inadequate prep  EGD:2017 - does not recall  ERCP:     History of Prep or Sedation intolerance with previous endoscopic procedures: None    ROS:  No fevers, chills. Denies nausea, vomiting. Denies abdominal pain. Medication History including herbals, supplements, and otc:  No current facility-administered medications on file prior to encounter. topiramate 100 MG Oral Tab, Take 1 tablet (100 mg total) by mouth 2 (two) times daily. , Disp: , Rfl:   lamoTRIgine 150 MG Oral Tab, Take 1 tablet (150 mg total) by mouth 2 (two) times daily. , Disp: , Rfl:   gabapentin 400 MG Oral Cap, Take 1 capsule (400 mg total) by mouth in the morning, at noon, in the evening, and at bedtime. , Disp: , Rfl:   PRAZOSIN HCL OR, Take 8 mg by mouth nightly., Disp: , Rfl:   vortioxetine 10 MG Oral Tab, Take 2 tablets (20 mg total) by mouth daily. , Disp: , Rfl:   HydrOXYzine HCl 25 MG Oral Tab, Take 2 tablets (50 mg total) by mouth 2 (two) times daily as needed for Itching., Disp: , Rfl:   TraZODone HCl 100 MG Oral Tab, Take 1 tablet (100 mg total) by mouth nightly., Disp: , Rfl:   Venlafaxine HCl 75 MG Oral Tab, Take 225 mg by mouth nightly., Disp: , Rfl:   risperiDONE 1 MG Oral Tab, Take 1 tablet (1 mg total) by mouth nightly., Disp: , Rfl:   ibuprofen 600 MG Oral Tab, Take 1 tablet (600 mg total) by mouth every 6 hours with food, Disp: 20 tablet, Rfl: 0           Reglan [Metoclopram*    HIVES, MYALGIA    Past Medical History:   Diagnosis Date    Anxiety     Depression     Seizure disorder ([address]     Epileptic and pseudo seizure     History reviewed. No pertinent surgical history. Social History    Socioeconomic History      Marital status:     Tobacco Use      Smoking status: Some Days      Smokeless tobacco: Never    Vaping Use      Vaping Use: Never used    Substance and Sexual Activity      Alcohol use: Not Currently      Drug use: Never    Family History of GI/Pancreas/Liver cancers or illnesses:  No hx of GI tract cancers, IBD, Celiac     History reviewed. No pertinent family history. PHYSICAL EXAM:     General: well developed, well nourished, in no apparent distress  HEENT:  Conjunctiva clear; mucous membranes moist  Lungs: non-labored; CTAB  Abdomen: normoactive BS; soft; nontender, non-distended  Neuro: no gross deficit, gait intact  Skin: warm, dry and intact  MSK: moving all extremities; no deformity  Psych: appropriate mood/affect    ASSESSMENT AND PLAN:   Han Lin is a 40year old female presenting for endoscopy      Iron deficiency anemia, unspecified iron deficiency anemia type and RUQ pain -  upper scope to r/o bleeding    The risks and benefits of my recommendations, as well as other treatment options were discussed with the patient today. Questions were answered. The patient indicates understanding of these issues and agrees to the plan. Plan for egd with possible biopsies and possible polypectomy. The procedure, indications, and risks (including perforation, bleeding, infection, and sedation related complications) were discussed.

## 2023-07-27 NOTE — DISCHARGE INSTRUCTIONS
ENDOSCOPY DISCHARGE INSTRUCTIONS    Procedure Performed:   Gastroscopy    Endoscopist: Bev Lopes MD  FINDINGS:   Esophagitis (inflamation of the esophagus lining) and Gastritis (inflammation of the stomach lining)    MEDICATIONS:  You may resume all other medications today    DIET:  Regular    BIOPSIES:  Biopsies were taken (you will be notified of results in 7-10 days)    X-RAYS:   No X-Rays were ordered today    Recommendations: 1. No changes to diet  2. Changes to medications:  -Start daily acid suppression 30 minutes before breakfast for 8 weeks  3. Call GI clinic in 7-10 days if you do not hear from regarding your biopsy results    Activity for remainder of today:    REST TODAY  DO NOT drive or operate heavy machinery  DO NOT drink any alcoholic beverages  DO NOT sign any legal documents or make any important decisions    After your procedure(s): It is not unusual to feel bloated or gassy . Passing gas and belching is encouraged. Lying on your left side with your knees flexed may relieve the discomfort. A hot pack to the abdomen may also help. After your gastroscopy (upper endoscopy): You may experience a slight sore throat which will subside. Throat lozenges or salt water gargle can be used. FOLLOW-UP:  Contact the office at [phone] for follow-up appointment is needed or if you develop any of the following:    Severe abdominal pain/discomfort     Excessive bleeding                     Black tarry stool    Difficulty breathing/swallowing      Persistent nausea/vomiting  Fever above 100 degrees or chills        Home Care Instructions for Gastroscopy with Sedation    Diet:  - Resume your regular diet as tolerated unless otherwise instructed. - Start with light meals to minimize bloating.  - Do not drink alcohol today. Medication:  - If you have questions about resuming your normal medications, please contact your Primary Care Physician. Activities:  - Take it easy today.  Do not return to work today.  - Do not drive today. - Do not operate any machinery today (including kitchen equipment). Gastroscopy:  - You may have a sore throat for 2-3 days following the exam. This is normal. Gargling with warm salt water (1/2 tsp salt to 1 glass warm water) or using throat lozenges will help. - If you experience any sharp pain in your neck, abdomen or chest, vomiting of blood, oral temperature over 100 degrees Fahrenheit, light-headedness or dizziness, or any other problems, contact your doctor. **If unable to reach your doctor, please go to the [facility] Emergency Room**    - Your referring physician will receive a full report of your examination.  - If you do not hear from your doctor's office within two weeks of your biopsy, please call them for your results. You may be able to see your laboratory results in [facility] between 4 and 7 business days. In some cases, your physician may not have viewed the results before they are released to [address] If you have questions regarding your results contact the physician who ordered the test/exam by phone or via [address] by choosing \"Ask a Medical Question. \"

## 2023-07-31 DIAGNOSIS — F43.10 PTSD (POST-TRAUMATIC STRESS DISORDER): ICD-10-CM

## 2023-07-31 RX ORDER — PRAZOSIN HYDROCHLORIDE 2 MG/1
CAPSULE ORAL
Qty: 150 CAPSULE | Refills: 0 | OUTPATIENT
Start: 2023-07-31

## 2023-08-04 ENCOUNTER — E-ADVICE (OUTPATIENT)
Dept: BEHAVIORAL HEALTH | Age: 38
End: 2023-08-04

## 2023-08-04 DIAGNOSIS — F51.01 PRIMARY INSOMNIA: ICD-10-CM

## 2023-08-04 DIAGNOSIS — F31.32 BIPOLAR AFFECTIVE DISORDER, CURRENTLY DEPRESSED, MODERATE (CMD): ICD-10-CM

## 2023-08-04 RX ORDER — VORTIOXETINE 20 MG/1
20 TABLET, FILM COATED ORAL DAILY
Qty: 5 TABLET | Refills: 0 | Status: SHIPPED | OUTPATIENT
Start: 2023-08-04 | End: 2023-08-08 | Stop reason: SDUPTHER

## 2023-08-04 RX ORDER — TRAZODONE HYDROCHLORIDE 100 MG/1
100 TABLET ORAL NIGHTLY PRN
Qty: 5 TABLET | Refills: 0 | Status: SHIPPED | OUTPATIENT
Start: 2023-08-04 | End: 2023-08-08 | Stop reason: SDUPTHER

## 2023-08-04 RX ORDER — LAMOTRIGINE 150 MG/1
150 TABLET ORAL 2 TIMES DAILY
Qty: 10 TABLET | Refills: 0 | Status: SHIPPED | OUTPATIENT
Start: 2023-08-04 | End: 2023-08-08 | Stop reason: SDUPTHER

## 2023-08-07 ENCOUNTER — TELEPHONE (OUTPATIENT)
Dept: BEHAVIORAL HEALTH | Age: 38
End: 2023-08-07

## 2023-08-08 ENCOUNTER — BEHAVIORAL HEALTH (OUTPATIENT)
Dept: BEHAVIORAL HEALTH | Age: 38
End: 2023-08-08

## 2023-08-08 DIAGNOSIS — F41.9 ANXIETY: ICD-10-CM

## 2023-08-08 DIAGNOSIS — F43.10 PTSD (POST-TRAUMATIC STRESS DISORDER): ICD-10-CM

## 2023-08-08 DIAGNOSIS — F51.01 PRIMARY INSOMNIA: ICD-10-CM

## 2023-08-08 DIAGNOSIS — F31.32 BIPOLAR AFFECTIVE DISORDER, CURRENTLY DEPRESSED, MODERATE (CMD): Primary | ICD-10-CM

## 2023-08-08 PROCEDURE — 99214 OFFICE O/P EST MOD 30 MIN: CPT | Performed by: PSYCHIATRY & NEUROLOGY

## 2023-08-08 RX ORDER — LAMOTRIGINE 150 MG/1
150 TABLET ORAL 2 TIMES DAILY
Qty: 60 TABLET | Refills: 0 | Status: SHIPPED | OUTPATIENT
Start: 2023-08-08 | End: 2023-09-08 | Stop reason: SDUPTHER

## 2023-08-08 RX ORDER — TRAZODONE HYDROCHLORIDE 100 MG/1
100-200 TABLET ORAL NIGHTLY PRN
Qty: 60 TABLET | Refills: 0 | Status: SHIPPED | OUTPATIENT
Start: 2023-08-08 | End: 2023-09-08 | Stop reason: SDUPTHER

## 2023-08-08 RX ORDER — PRAZOSIN HYDROCHLORIDE 2 MG/1
CAPSULE ORAL
Qty: 150 CAPSULE | Refills: 0 | Status: SHIPPED | OUTPATIENT
Start: 2023-08-08 | End: 2023-09-08 | Stop reason: SDUPTHER

## 2023-08-08 RX ORDER — HYDROXYZINE 50 MG/1
50 TABLET, FILM COATED ORAL 2 TIMES DAILY PRN
Qty: 60 TABLET | Refills: 0 | Status: SHIPPED | OUTPATIENT
Start: 2023-08-08 | End: 2023-09-08 | Stop reason: SDUPTHER

## 2023-08-15 ENCOUNTER — APPOINTMENT (OUTPATIENT)
Dept: CT IMAGING | Age: 38
End: 2023-08-15
Attending: EMERGENCY MEDICINE

## 2023-08-15 ENCOUNTER — HOSPITAL ENCOUNTER (EMERGENCY)
Age: 38
Discharge: HOME | End: 2023-08-15
Attending: EMERGENCY MEDICINE

## 2023-08-15 VITALS
BODY MASS INDEX: 38.9 KG/M2 | OXYGEN SATURATION: 95 % | RESPIRATION RATE: 17 BRPM | TEMPERATURE: 98 F | SYSTOLIC BLOOD PRESSURE: 123 MMHG | HEART RATE: 73 BPM | DIASTOLIC BLOOD PRESSURE: 75 MMHG | WEIGHT: 226.63 LBS

## 2023-08-15 DIAGNOSIS — E87.6 HYPOKALEMIA: ICD-10-CM

## 2023-08-15 DIAGNOSIS — R55 VASOVAGAL NEAR SYNCOPE: Primary | ICD-10-CM

## 2023-08-15 LAB
ALBUMIN SERPL-MCNC: 3.7 G/DL (ref 3.6–5.1)
ALP SERPL-CCNC: 49 UNITS/L (ref 45–117)
ALT SERPL-CCNC: 22 UNITS/L
ANION GAP SERPL CALC-SCNC: 7 MMOL/L (ref 7–19)
AST SERPL-CCNC: 27 UNITS/L
ATRIAL RATE (BPM): 75
BASOPHILS # BLD: 0.1 K/MCL (ref 0–0.3)
BASOPHILS NFR BLD: 1 %
BILIRUB CONJ SERPL-MCNC: 0.3 MG/DL (ref 0–0.2)
BILIRUB SERPL-MCNC: 0.9 MG/DL (ref 0.2–1)
BUN SERPL-MCNC: 3 MG/DL (ref 6–20)
BUN/CREAT SERPL: 5 (ref 7–25)
CALCIUM SERPL-MCNC: 8.8 MG/DL (ref 8.4–10.2)
CHLORIDE SERPL-SCNC: 112 MMOL/L (ref 97–110)
CO2 SERPL-SCNC: 25 MMOL/L (ref 21–32)
CREAT SERPL-MCNC: 0.66 MG/DL (ref 0.51–0.95)
DEPRECATED RDW RBC: 35.9 FL (ref 39–50)
EOSINOPHIL # BLD: 0.2 K/MCL (ref 0–0.5)
EOSINOPHIL NFR BLD: 3 %
ERYTHROCYTE [DISTWIDTH] IN BLOOD: 17.6 % (ref 11–15)
FASTING DURATION TIME PATIENT: ABNORMAL H
GFR SERPLBLD BASED ON 1.73 SQ M-ARVRAT: >90 ML/MIN
GLUCOSE SERPL-MCNC: 112 MG/DL (ref 70–99)
HCT VFR BLD CALC: 33 % (ref 36–46.5)
HGB BLD-MCNC: 10.3 G/DL (ref 12–15.5)
IMM GRANULOCYTES # BLD AUTO: 0 K/MCL (ref 0–0.2)
IMM GRANULOCYTES # BLD: 0 %
LACTATE BLDV-SCNC: 0.8 MMOL/L (ref 0–2)
LYMPHOCYTES # BLD: 1.3 K/MCL (ref 1–4.8)
LYMPHOCYTES NFR BLD: 21 %
MCH RBC QN AUTO: 19.1 PG (ref 26–34)
MCHC RBC AUTO-ENTMCNC: 31.2 G/DL (ref 32–36.5)
MCV RBC AUTO: 61.1 FL (ref 78–100)
MONOCYTES # BLD: 0.3 K/MCL (ref 0.3–0.9)
MONOCYTES NFR BLD: 4 %
NEUTROPHILS # BLD: 4.3 K/MCL (ref 1.8–7.7)
NEUTROPHILS NFR BLD: 71 %
NRBC BLD MANUAL-RTO: 0 /100 WBC
P AXIS (DEGREES): 45
PLATELET # BLD AUTO: 304 K/MCL (ref 140–450)
POTASSIUM SERPL-SCNC: 3 MMOL/L (ref 3.4–5.1)
PR-INTERVAL (MSEC): 156
PROT SERPL-MCNC: 6.9 G/DL (ref 6.4–8.2)
QRS-INTERVAL (MSEC): 80
QT-INTERVAL (MSEC): 432
QTC: 482
R AXIS (DEGREES): 25
RAINBOW EXTRA TUBES HOLD SPECIMEN: NORMAL
RBC # BLD: 5.4 MIL/MCL (ref 4–5.2)
REPORT TEXT: NORMAL
SODIUM SERPL-SCNC: 141 MMOL/L (ref 135–145)
T AXIS (DEGREES): 33
TROPONIN I SERPL DL<=0.01 NG/ML-MCNC: 4 NG/L
VENTRICULAR RATE EKG/MIN (BPM): 75
WBC # BLD: 6.1 K/MCL (ref 4.2–11)

## 2023-08-15 PROCEDURE — 80048 BASIC METABOLIC PNL TOTAL CA: CPT | Performed by: EMERGENCY MEDICINE

## 2023-08-15 PROCEDURE — 83605 ASSAY OF LACTIC ACID: CPT | Performed by: EMERGENCY MEDICINE

## 2023-08-15 PROCEDURE — 84484 ASSAY OF TROPONIN QUANT: CPT | Performed by: EMERGENCY MEDICINE

## 2023-08-15 PROCEDURE — 96360 HYDRATION IV INFUSION INIT: CPT

## 2023-08-15 PROCEDURE — 93005 ELECTROCARDIOGRAM TRACING: CPT | Performed by: EMERGENCY MEDICINE

## 2023-08-15 PROCEDURE — 70450 CT HEAD/BRAIN W/O DYE: CPT

## 2023-08-15 PROCEDURE — 10002803 HB RX 637: Performed by: EMERGENCY MEDICINE

## 2023-08-15 PROCEDURE — 93010 ELECTROCARDIOGRAM REPORT: CPT | Performed by: SPECIALIST

## 2023-08-15 PROCEDURE — 10002807 HB RX 258: Performed by: EMERGENCY MEDICINE

## 2023-08-15 PROCEDURE — G1004 CDSM NDSC: HCPCS

## 2023-08-15 PROCEDURE — 85025 COMPLETE CBC W/AUTO DIFF WBC: CPT | Performed by: EMERGENCY MEDICINE

## 2023-08-15 PROCEDURE — 99284 EMERGENCY DEPT VISIT MOD MDM: CPT

## 2023-08-15 PROCEDURE — 80076 HEPATIC FUNCTION PANEL: CPT | Performed by: EMERGENCY MEDICINE

## 2023-08-15 RX ORDER — POTASSIUM CHLORIDE 20 MEQ/1
40 TABLET, EXTENDED RELEASE ORAL ONCE
Status: COMPLETED | OUTPATIENT
Start: 2023-08-15 | End: 2023-08-15

## 2023-08-15 RX ORDER — POTASSIUM CHLORIDE 20 MEQ/1
20 TABLET, EXTENDED RELEASE ORAL DAILY
Qty: 5 TABLET | Refills: 0 | Status: SHIPPED | OUTPATIENT
Start: 2023-08-15 | End: 2023-08-20

## 2023-08-15 RX ADMIN — SODIUM CHLORIDE 1000 ML: 0.9 INJECTION, SOLUTION INTRAVENOUS at 10:30

## 2023-08-15 RX ADMIN — POTASSIUM CHLORIDE 40 MEQ: 1500 TABLET, EXTENDED RELEASE ORAL at 11:36

## 2023-08-15 ASSESSMENT — PAIN SCALES - GENERAL: PAINLEVEL_OUTOF10: 0

## 2023-08-16 ENCOUNTER — E-ADVICE (OUTPATIENT)
Dept: NEUROLOGY | Age: 38
End: 2023-08-16

## 2023-08-17 ENCOUNTER — TELEPHONE (OUTPATIENT)
Dept: NEUROLOGY | Age: 38
End: 2023-08-17

## 2023-08-17 ENCOUNTER — V-VISIT (OUTPATIENT)
Dept: NEUROLOGY | Age: 38
End: 2023-08-17

## 2023-08-17 DIAGNOSIS — M79.7 FIBROMYALGIA SYNDROME: ICD-10-CM

## 2023-08-17 DIAGNOSIS — F48.8 PSYCHOGENIC SYNCOPE: Primary | ICD-10-CM

## 2023-08-17 DIAGNOSIS — F44.5 PSYCHOGENIC NONEPILEPTIC SEIZURE: ICD-10-CM

## 2023-08-17 DIAGNOSIS — G43.101 MIGRAINE WITH AURA AND WITH STATUS MIGRAINOSUS, NOT INTRACTABLE: Chronic | ICD-10-CM

## 2023-08-17 PROCEDURE — 99215 OFFICE O/P EST HI 40 MIN: CPT | Performed by: PSYCHIATRY & NEUROLOGY

## 2023-08-17 RX ORDER — TOPIRAMATE 50 MG/1
50 TABLET, FILM COATED ORAL 2 TIMES DAILY
Qty: 60 TABLET | Refills: 11 | Status: SHIPPED | OUTPATIENT
Start: 2023-08-17 | End: 2023-10-24 | Stop reason: ALTCHOICE

## 2023-08-17 RX ORDER — HYDROXYZINE HYDROCHLORIDE 25 MG/1
50 TABLET, FILM COATED ORAL
COMMUNITY
End: 2023-08-22 | Stop reason: SDUPTHER

## 2023-08-17 RX ORDER — PANTOPRAZOLE SODIUM 20 MG/1
TABLET, DELAYED RELEASE ORAL
COMMUNITY
Start: 2023-07-27

## 2023-08-17 RX ORDER — CETIRIZINE HYDROCHLORIDE 10 MG/1
TABLET ORAL
COMMUNITY

## 2023-08-17 RX ORDER — FREMANEZUMAB-VFRM 225 MG/1.5ML
225 INJECTION SUBCUTANEOUS
Qty: 1.5 ML | Refills: 11 | Status: SHIPPED | OUTPATIENT
Start: 2023-08-17

## 2023-08-17 RX ORDER — FLUTICASONE PROPIONATE 50 MCG
SPRAY, SUSPENSION (ML) NASAL
COMMUNITY

## 2023-08-17 RX ORDER — TOPIRAMATE 100 MG/1
1 TABLET, FILM COATED ORAL 2 TIMES DAILY
COMMUNITY
End: 2023-08-17 | Stop reason: ALTCHOICE

## 2023-08-17 RX ORDER — GABAPENTIN 400 MG/1
1 CAPSULE ORAL 4 TIMES DAILY
COMMUNITY
End: 2023-08-22 | Stop reason: SDUPTHER

## 2023-08-23 ENCOUNTER — E-ADVICE (OUTPATIENT)
Dept: NEUROLOGY | Age: 38
End: 2023-08-23

## 2023-08-28 ENCOUNTER — E-ADVICE (OUTPATIENT)
Dept: NEUROLOGY | Age: 38
End: 2023-08-28

## 2023-09-04 DIAGNOSIS — F51.01 PRIMARY INSOMNIA: ICD-10-CM

## 2023-09-04 DIAGNOSIS — F31.32 BIPOLAR AFFECTIVE DISORDER, CURRENTLY DEPRESSED, MODERATE (CMD): ICD-10-CM

## 2023-09-05 RX ORDER — TRAZODONE HYDROCHLORIDE 100 MG/1
100-200 TABLET ORAL NIGHTLY PRN
Qty: 60 TABLET | Refills: 0 | OUTPATIENT
Start: 2023-09-05

## 2023-09-05 RX ORDER — LAMOTRIGINE 150 MG/1
150 TABLET ORAL 2 TIMES DAILY
Qty: 60 TABLET | Refills: 0 | OUTPATIENT
Start: 2023-09-05

## 2023-09-05 RX ORDER — VORTIOXETINE 20 MG/1
20 TABLET, FILM COATED ORAL DAILY
Qty: 30 TABLET | Refills: 0 | OUTPATIENT
Start: 2023-09-05

## 2023-09-07 DIAGNOSIS — F51.01 PRIMARY INSOMNIA: ICD-10-CM

## 2023-09-07 RX ORDER — TRAZODONE HYDROCHLORIDE 100 MG/1
100-200 TABLET ORAL NIGHTLY PRN
Qty: 60 TABLET | Refills: 0 | OUTPATIENT
Start: 2023-09-07

## 2023-09-08 ENCOUNTER — BEHAVIORAL HEALTH (OUTPATIENT)
Dept: BEHAVIORAL HEALTH | Age: 38
End: 2023-09-08

## 2023-09-08 DIAGNOSIS — F43.10 PTSD (POST-TRAUMATIC STRESS DISORDER): ICD-10-CM

## 2023-09-08 DIAGNOSIS — F31.32 BIPOLAR AFFECTIVE DISORDER, CURRENTLY DEPRESSED, MODERATE (CMD): Primary | ICD-10-CM

## 2023-09-08 DIAGNOSIS — F41.9 ANXIETY: ICD-10-CM

## 2023-09-08 DIAGNOSIS — F51.01 PRIMARY INSOMNIA: ICD-10-CM

## 2023-09-08 PROCEDURE — 99214 OFFICE O/P EST MOD 30 MIN: CPT | Performed by: PSYCHIATRY & NEUROLOGY

## 2023-09-08 RX ORDER — PRAZOSIN HYDROCHLORIDE 2 MG/1
CAPSULE ORAL
Qty: 150 CAPSULE | Refills: 0 | Status: SHIPPED | OUTPATIENT
Start: 2023-09-08 | End: 2023-10-17 | Stop reason: SDUPTHER

## 2023-09-08 RX ORDER — LAMOTRIGINE 150 MG/1
150 TABLET ORAL 2 TIMES DAILY
Qty: 60 TABLET | Refills: 0 | Status: SHIPPED | OUTPATIENT
Start: 2023-09-08 | End: 2023-10-04

## 2023-09-08 RX ORDER — HYDROXYZINE 50 MG/1
50 TABLET, FILM COATED ORAL 2 TIMES DAILY PRN
Qty: 60 TABLET | Refills: 0 | Status: SHIPPED | OUTPATIENT
Start: 2023-09-08 | End: 2023-10-12 | Stop reason: SDUPTHER

## 2023-09-08 RX ORDER — TRAZODONE HYDROCHLORIDE 100 MG/1
100-200 TABLET ORAL NIGHTLY PRN
Qty: 60 TABLET | Refills: 0 | Status: SHIPPED | OUTPATIENT
Start: 2023-09-08 | End: 2023-10-12 | Stop reason: SDUPTHER

## 2023-09-22 DIAGNOSIS — G43.101 MIGRAINE WITH AURA AND WITH STATUS MIGRAINOSUS, NOT INTRACTABLE: Primary | ICD-10-CM

## 2023-09-22 RX ORDER — SUMATRIPTAN 100 MG/1
TABLET, FILM COATED ORAL
Qty: 9 TABLET | Refills: 3 | Status: SHIPPED | OUTPATIENT
Start: 2023-09-22

## 2023-10-04 DIAGNOSIS — F31.32 BIPOLAR AFFECTIVE DISORDER, CURRENTLY DEPRESSED, MODERATE (CMD): ICD-10-CM

## 2023-10-04 RX ORDER — LAMOTRIGINE 150 MG/1
150 TABLET ORAL 2 TIMES DAILY
Qty: 60 TABLET | Refills: 0 | Status: SHIPPED | OUTPATIENT
Start: 2023-10-04 | End: 2023-10-17 | Stop reason: SDUPTHER

## 2023-10-12 ENCOUNTER — TELEPHONE (OUTPATIENT)
Dept: NEUROLOGY | Age: 38
End: 2023-10-12

## 2023-10-12 DIAGNOSIS — G43.101 MIGRAINE WITH AURA AND WITH STATUS MIGRAINOSUS, NOT INTRACTABLE: Chronic | ICD-10-CM

## 2023-10-12 DIAGNOSIS — F41.9 ANXIETY: ICD-10-CM

## 2023-10-12 DIAGNOSIS — F51.01 PRIMARY INSOMNIA: ICD-10-CM

## 2023-10-12 DIAGNOSIS — G44.059 SUNCT (SHORT UNILATERAL NEURALGIFORM HEADACHE, CONJUNCTIVAL INJ/TEAR): ICD-10-CM

## 2023-10-12 DIAGNOSIS — F31.32 BIPOLAR AFFECTIVE DISORDER, CURRENTLY DEPRESSED, MODERATE (CMD): ICD-10-CM

## 2023-10-12 RX ORDER — TRAZODONE HYDROCHLORIDE 100 MG/1
100-200 TABLET ORAL NIGHTLY PRN
Qty: 12 TABLET | Refills: 0 | Status: SHIPPED | OUTPATIENT
Start: 2023-10-12 | End: 2023-10-17 | Stop reason: SDUPTHER

## 2023-10-12 RX ORDER — HYDROXYZINE 50 MG/1
50 TABLET, FILM COATED ORAL 2 TIMES DAILY PRN
Qty: 12 TABLET | Refills: 0 | Status: SHIPPED | OUTPATIENT
Start: 2023-10-12 | End: 2023-10-17 | Stop reason: SDUPTHER

## 2023-10-12 RX ORDER — GABAPENTIN 400 MG/1
400 CAPSULE ORAL 4 TIMES DAILY
Qty: 360 CAPSULE | Refills: 3 | OUTPATIENT
Start: 2023-10-12 | End: 2024-10-11

## 2023-10-12 RX ORDER — FREMANEZUMAB-VFRM 225 MG/1.5ML
225 INJECTION SUBCUTANEOUS
Qty: 1.5 ML | Refills: 11 | OUTPATIENT
Start: 2023-10-12

## 2023-10-17 ENCOUNTER — BEHAVIORAL HEALTH (OUTPATIENT)
Dept: BEHAVIORAL HEALTH | Age: 38
End: 2023-10-17

## 2023-10-17 ENCOUNTER — TELEPHONE (OUTPATIENT)
Dept: NEUROLOGY | Age: 38
End: 2023-10-17

## 2023-10-17 ENCOUNTER — E-ADVICE (OUTPATIENT)
Dept: NEUROLOGY | Age: 38
End: 2023-10-17

## 2023-10-17 DIAGNOSIS — F31.32 BIPOLAR AFFECTIVE DISORDER, CURRENTLY DEPRESSED, MODERATE (CMD): Primary | ICD-10-CM

## 2023-10-17 DIAGNOSIS — F41.9 ANXIETY: ICD-10-CM

## 2023-10-17 DIAGNOSIS — F51.01 PRIMARY INSOMNIA: ICD-10-CM

## 2023-10-17 DIAGNOSIS — F43.10 PTSD (POST-TRAUMATIC STRESS DISORDER): ICD-10-CM

## 2023-10-17 PROCEDURE — 99214 OFFICE O/P EST MOD 30 MIN: CPT | Performed by: PSYCHIATRY & NEUROLOGY

## 2023-10-17 RX ORDER — TRAZODONE HYDROCHLORIDE 100 MG/1
100-200 TABLET ORAL NIGHTLY PRN
Qty: 60 TABLET | Refills: 0 | Status: SHIPPED | OUTPATIENT
Start: 2023-10-17 | End: 2023-11-14 | Stop reason: SDUPTHER

## 2023-10-17 RX ORDER — HYDROXYZINE 50 MG/1
50 TABLET, FILM COATED ORAL 2 TIMES DAILY PRN
Qty: 60 TABLET | Refills: 0 | Status: SHIPPED | OUTPATIENT
Start: 2023-10-17 | End: 2023-11-14 | Stop reason: SDUPTHER

## 2023-10-17 RX ORDER — LAMOTRIGINE 150 MG/1
150 TABLET ORAL 2 TIMES DAILY
Qty: 60 TABLET | Refills: 0 | Status: SHIPPED | OUTPATIENT
Start: 2023-10-17 | End: 2023-11-14 | Stop reason: SDUPTHER

## 2023-10-17 RX ORDER — PRAZOSIN HYDROCHLORIDE 2 MG/1
CAPSULE ORAL
Qty: 150 CAPSULE | Refills: 0 | Status: SHIPPED | OUTPATIENT
Start: 2023-10-17 | End: 2023-11-14 | Stop reason: SDUPTHER

## 2023-10-19 ENCOUNTER — TELEPHONE (OUTPATIENT)
Dept: BEHAVIORAL HEALTH | Age: 38
End: 2023-10-19

## 2023-10-19 RX ORDER — ZOLPIDEM TARTRATE 10 MG/1
10 TABLET ORAL NIGHTLY PRN
Qty: 30 TABLET | Refills: 0 | Status: SHIPPED | OUTPATIENT
Start: 2023-10-19 | End: 2023-11-14 | Stop reason: SDUPTHER

## 2023-10-19 RX ORDER — ZOLPIDEM TARTRATE 10 MG/1
10 TABLET ORAL NIGHTLY PRN
COMMUNITY
End: 2023-10-19 | Stop reason: SDUPTHER

## 2023-10-24 ENCOUNTER — TELEPHONE (OUTPATIENT)
Dept: NEUROLOGY | Age: 38
End: 2023-10-24

## 2023-10-24 ENCOUNTER — APPOINTMENT (OUTPATIENT)
Dept: NEUROLOGY | Age: 38
End: 2023-10-24

## 2023-10-24 ENCOUNTER — V-VISIT (OUTPATIENT)
Dept: NEUROLOGY | Age: 38
End: 2023-10-24

## 2023-10-24 DIAGNOSIS — F48.8 PSYCHOGENIC SYNCOPE: ICD-10-CM

## 2023-10-24 DIAGNOSIS — F44.5 PSYCHOGENIC NONEPILEPTIC SEIZURE: ICD-10-CM

## 2023-10-24 DIAGNOSIS — M79.7 FIBROMYALGIA SYNDROME: ICD-10-CM

## 2023-10-24 DIAGNOSIS — G43.101 MIGRAINE WITH AURA AND WITH STATUS MIGRAINOSUS, NOT INTRACTABLE: Primary | ICD-10-CM

## 2023-10-24 PROCEDURE — 99215 OFFICE O/P EST HI 40 MIN: CPT | Performed by: PSYCHIATRY & NEUROLOGY

## 2023-10-24 RX ORDER — DICYCLOMINE HYDROCHLORIDE 10 MG/1
CAPSULE ORAL
COMMUNITY
Start: 2023-10-05

## 2023-10-24 RX ORDER — MECLIZINE HYDROCHLORIDE 25 MG/1
TABLET ORAL
COMMUNITY
Start: 2023-09-26 | End: 2023-10-24

## 2023-10-24 RX ORDER — TOPIRAMATE 100 MG/1
100 TABLET, FILM COATED ORAL 2 TIMES DAILY
COMMUNITY
Start: 2023-10-07

## 2023-11-04 ENCOUNTER — EXTERNAL RECORD (OUTPATIENT)
Dept: HEALTH INFORMATION MANAGEMENT | Facility: OTHER | Age: 38
End: 2023-11-04

## 2023-11-06 ENCOUNTER — TELEPHONE (OUTPATIENT)
Dept: NEUROLOGY | Age: 38
End: 2023-11-06

## 2023-11-06 ENCOUNTER — E-ADVICE (OUTPATIENT)
Dept: NEUROLOGY | Age: 38
End: 2023-11-06

## 2023-11-14 ENCOUNTER — BEHAVIORAL HEALTH (OUTPATIENT)
Dept: BEHAVIORAL HEALTH | Age: 38
End: 2023-11-14

## 2023-11-14 ENCOUNTER — TELEPHONE (OUTPATIENT)
Dept: BEHAVIORAL HEALTH | Age: 38
End: 2023-11-14

## 2023-11-14 DIAGNOSIS — F41.9 ANXIETY: ICD-10-CM

## 2023-11-14 DIAGNOSIS — F43.10 PTSD (POST-TRAUMATIC STRESS DISORDER): ICD-10-CM

## 2023-11-14 DIAGNOSIS — F31.32 BIPOLAR AFFECTIVE DISORDER, CURRENTLY DEPRESSED, MODERATE (CMD): Primary | ICD-10-CM

## 2023-11-14 DIAGNOSIS — F51.01 PRIMARY INSOMNIA: ICD-10-CM

## 2023-11-14 PROCEDURE — 99214 OFFICE O/P EST MOD 30 MIN: CPT | Performed by: PSYCHIATRY & NEUROLOGY

## 2023-11-14 RX ORDER — PRAZOSIN HYDROCHLORIDE 2 MG/1
2 CAPSULE ORAL DAILY
Qty: 30 CAPSULE | Refills: 0 | Status: SHIPPED | OUTPATIENT
Start: 2023-11-14

## 2023-11-14 RX ORDER — TRAZODONE HYDROCHLORIDE 100 MG/1
100-200 TABLET ORAL NIGHTLY PRN
Qty: 60 TABLET | Refills: 0 | Status: SHIPPED | OUTPATIENT
Start: 2023-11-14

## 2023-11-14 RX ORDER — PRAZOSIN HYDROCHLORIDE 5 MG/1
10 CAPSULE ORAL NIGHTLY
Qty: 60 CAPSULE | Refills: 0 | Status: SHIPPED | OUTPATIENT
Start: 2023-11-14

## 2023-11-14 RX ORDER — HYDROXYZINE 50 MG/1
50 TABLET, FILM COATED ORAL 2 TIMES DAILY PRN
Qty: 60 TABLET | Refills: 0 | Status: SHIPPED | OUTPATIENT
Start: 2023-11-14

## 2023-11-14 RX ORDER — ZOLPIDEM TARTRATE 10 MG/1
10 TABLET ORAL NIGHTLY PRN
Qty: 30 TABLET | Refills: 0 | Status: SHIPPED | OUTPATIENT
Start: 2023-11-14

## 2023-11-14 RX ORDER — LAMOTRIGINE 150 MG/1
150 TABLET ORAL 2 TIMES DAILY
Qty: 60 TABLET | Refills: 0 | Status: SHIPPED | OUTPATIENT
Start: 2023-11-14

## 2023-12-12 ENCOUNTER — HOSPITAL ENCOUNTER (EMERGENCY)
Age: 38
Discharge: HOME | End: 2023-12-12
Attending: STUDENT IN AN ORGANIZED HEALTH CARE EDUCATION/TRAINING PROGRAM

## 2023-12-12 ENCOUNTER — APPOINTMENT (OUTPATIENT)
Dept: BEHAVIORAL HEALTH | Age: 38
End: 2023-12-12

## 2023-12-12 ENCOUNTER — APPOINTMENT (OUTPATIENT)
Dept: CT IMAGING | Age: 38
End: 2023-12-12
Attending: STUDENT IN AN ORGANIZED HEALTH CARE EDUCATION/TRAINING PROGRAM

## 2023-12-12 VITALS
HEIGHT: 65 IN | RESPIRATION RATE: 17 BRPM | DIASTOLIC BLOOD PRESSURE: 69 MMHG | TEMPERATURE: 98.5 F | WEIGHT: 217.15 LBS | HEART RATE: 67 BPM | OXYGEN SATURATION: 98 % | SYSTOLIC BLOOD PRESSURE: 111 MMHG | BODY MASS INDEX: 36.18 KG/M2

## 2023-12-12 DIAGNOSIS — R10.11 RIGHT UPPER QUADRANT ABDOMINAL PAIN: ICD-10-CM

## 2023-12-12 DIAGNOSIS — R55 SYNCOPE, UNSPECIFIED SYNCOPE TYPE: Primary | ICD-10-CM

## 2023-12-12 LAB
ALBUMIN SERPL-MCNC: 3.6 G/DL (ref 3.6–5.1)
ALBUMIN/GLOB SERPL: 1.1 {RATIO} (ref 1–2.4)
ALP SERPL-CCNC: 48 UNITS/L (ref 45–117)
ALT SERPL-CCNC: 22 UNITS/L
ANION GAP SERPL CALC-SCNC: 9 MMOL/L (ref 7–19)
AST SERPL-CCNC: 27 UNITS/L
ATRIAL RATE (BPM): 62
BASOPHILS # BLD: 0.1 K/MCL (ref 0–0.3)
BASOPHILS NFR BLD: 1 %
BILIRUB SERPL-MCNC: 0.7 MG/DL (ref 0.2–1)
BUN SERPL-MCNC: 9 MG/DL (ref 6–20)
BUN/CREAT SERPL: 12 (ref 7–25)
CALCIUM SERPL-MCNC: 8.9 MG/DL (ref 8.4–10.2)
CHLORIDE SERPL-SCNC: 111 MMOL/L (ref 97–110)
CO2 SERPL-SCNC: 21 MMOL/L (ref 21–32)
CREAT SERPL-MCNC: 0.78 MG/DL (ref 0.51–0.95)
DEPRECATED RDW RBC: 33.9 FL (ref 39–50)
EGFRCR SERPLBLD CKD-EPI 2021: >90 ML/MIN/{1.73_M2}
EOSINOPHIL # BLD: 0.2 K/MCL (ref 0–0.5)
EOSINOPHIL NFR BLD: 2 %
ERYTHROCYTE [DISTWIDTH] IN BLOOD: 17.2 % (ref 11–15)
FASTING DURATION TIME PATIENT: ABNORMAL H
GLOBULIN SER-MCNC: 3.2 G/DL (ref 2–4)
GLUCOSE SERPL-MCNC: 96 MG/DL (ref 70–99)
HCG SERPL-ACNC: <2 MUNITS/ML
HCT VFR BLD CALC: 36 % (ref 36–46.5)
HGB BLD-MCNC: 11 G/DL (ref 12–15.5)
IMM GRANULOCYTES # BLD AUTO: 0 K/MCL (ref 0–0.2)
IMM GRANULOCYTES # BLD: 0 %
LYMPHOCYTES # BLD: 1.6 K/MCL (ref 1–4.8)
LYMPHOCYTES NFR BLD: 21 %
MCH RBC QN AUTO: 19 PG (ref 26–34)
MCHC RBC AUTO-ENTMCNC: 30.6 G/DL (ref 32–36.5)
MCV RBC AUTO: 62.2 FL (ref 78–100)
MONOCYTES # BLD: 0.2 K/MCL (ref 0.3–0.9)
MONOCYTES NFR BLD: 3 %
NEUTROPHILS # BLD: 5.5 K/MCL (ref 1.8–7.7)
NEUTROPHILS NFR BLD: 73 %
NRBC BLD MANUAL-RTO: 0 /100 WBC
P AXIS (DEGREES): 41
PLATELET # BLD AUTO: 377 K/MCL (ref 140–450)
POTASSIUM SERPL-SCNC: 4.3 MMOL/L (ref 3.4–5.1)
PR-INTERVAL (MSEC): 146
PROT SERPL-MCNC: 6.8 G/DL (ref 6.4–8.2)
QRS-INTERVAL (MSEC): 80
QT-INTERVAL (MSEC): 442
QTC: 449
R AXIS (DEGREES): 57
RAINBOW EXTRA TUBES HOLD SPECIMEN: NORMAL
RBC # BLD: 5.79 MIL/MCL (ref 4–5.2)
REPORT TEXT: NORMAL
SODIUM SERPL-SCNC: 137 MMOL/L (ref 135–145)
T AXIS (DEGREES): 63
VENTRICULAR RATE EKG/MIN (BPM): 62
WBC # BLD: 7.6 K/MCL (ref 4.2–11)

## 2023-12-12 PROCEDURE — 93005 ELECTROCARDIOGRAM TRACING: CPT | Performed by: STUDENT IN AN ORGANIZED HEALTH CARE EDUCATION/TRAINING PROGRAM

## 2023-12-12 PROCEDURE — 80053 COMPREHEN METABOLIC PANEL: CPT | Performed by: STUDENT IN AN ORGANIZED HEALTH CARE EDUCATION/TRAINING PROGRAM

## 2023-12-12 PROCEDURE — 84702 CHORIONIC GONADOTROPIN TEST: CPT | Performed by: STUDENT IN AN ORGANIZED HEALTH CARE EDUCATION/TRAINING PROGRAM

## 2023-12-12 PROCEDURE — 70450 CT HEAD/BRAIN W/O DYE: CPT

## 2023-12-12 PROCEDURE — 10002807 HB RX 258: Performed by: STUDENT IN AN ORGANIZED HEALTH CARE EDUCATION/TRAINING PROGRAM

## 2023-12-12 PROCEDURE — 10002805 HB CONTRAST AGENT: Performed by: STUDENT IN AN ORGANIZED HEALTH CARE EDUCATION/TRAINING PROGRAM

## 2023-12-12 PROCEDURE — 85025 COMPLETE CBC W/AUTO DIFF WBC: CPT | Performed by: STUDENT IN AN ORGANIZED HEALTH CARE EDUCATION/TRAINING PROGRAM

## 2023-12-12 PROCEDURE — 74177 CT ABD & PELVIS W/CONTRAST: CPT

## 2023-12-12 RX ADMIN — IOHEXOL 80 ML: 350 INJECTION, SOLUTION INTRAVENOUS at 12:42

## 2023-12-12 RX ADMIN — SODIUM CHLORIDE, POTASSIUM CHLORIDE, SODIUM LACTATE AND CALCIUM CHLORIDE 1000 ML: 600; 310; 30; 20 INJECTION, SOLUTION INTRAVENOUS at 11:02

## 2023-12-12 ASSESSMENT — ENCOUNTER SYMPTOMS
CHILLS: 0
VOICE CHANGE: 0
COUGH: 0
CONFUSION: 0
FEVER: 0
ABDOMINAL PAIN: 1
NAUSEA: 0
VOMITING: 0
SHORTNESS OF BREATH: 0
DIZZINESS: 0
HEADACHES: 0
WOUND: 0

## 2023-12-12 ASSESSMENT — PAIN SCALES - GENERAL: PAINLEVEL_OUTOF10: 7

## 2023-12-15 ENCOUNTER — TELEPHONE (OUTPATIENT)
Dept: NEUROLOGY | Age: 38
End: 2023-12-15

## 2023-12-15 ENCOUNTER — BEHAVIORAL HEALTH (OUTPATIENT)
Dept: BEHAVIORAL HEALTH | Age: 38
End: 2023-12-15

## 2023-12-15 DIAGNOSIS — F41.9 ANXIETY: ICD-10-CM

## 2023-12-15 DIAGNOSIS — F43.10 PTSD (POST-TRAUMATIC STRESS DISORDER): ICD-10-CM

## 2023-12-15 DIAGNOSIS — F31.32 BIPOLAR AFFECTIVE DISORDER, CURRENTLY DEPRESSED, MODERATE (CMD): Primary | ICD-10-CM

## 2023-12-15 DIAGNOSIS — F51.01 PRIMARY INSOMNIA: ICD-10-CM

## 2023-12-15 PROCEDURE — 99214 OFFICE O/P EST MOD 30 MIN: CPT | Performed by: PSYCHIATRY & NEUROLOGY

## 2023-12-15 RX ORDER — TRAZODONE HYDROCHLORIDE 100 MG/1
100-200 TABLET ORAL NIGHTLY PRN
Qty: 60 TABLET | Refills: 0 | Status: SHIPPED | OUTPATIENT
Start: 2023-12-15

## 2023-12-15 RX ORDER — PRAZOSIN HYDROCHLORIDE 5 MG/1
10 CAPSULE ORAL NIGHTLY
Qty: 60 CAPSULE | Refills: 0 | Status: SHIPPED | OUTPATIENT
Start: 2023-12-15

## 2023-12-15 RX ORDER — HYDROXYZINE 50 MG/1
50 TABLET, FILM COATED ORAL 2 TIMES DAILY PRN
Qty: 60 TABLET | Refills: 0 | Status: SHIPPED | OUTPATIENT
Start: 2023-12-15

## 2023-12-15 RX ORDER — LAMOTRIGINE 150 MG/1
150 TABLET ORAL 2 TIMES DAILY
Qty: 60 TABLET | Refills: 0 | Status: SHIPPED | OUTPATIENT
Start: 2023-12-15

## 2023-12-15 RX ORDER — ZOLPIDEM TARTRATE 10 MG/1
10 TABLET ORAL NIGHTLY PRN
Qty: 30 TABLET | Refills: 0 | Status: SHIPPED | OUTPATIENT
Start: 2023-12-15

## 2023-12-15 RX ORDER — PRAZOSIN HYDROCHLORIDE 2 MG/1
2 CAPSULE ORAL DAILY
Qty: 30 CAPSULE | Refills: 0 | Status: SHIPPED | OUTPATIENT
Start: 2023-12-15

## 2023-12-21 ENCOUNTER — APPOINTMENT (OUTPATIENT)
Dept: CT IMAGING | Age: 38
End: 2023-12-21
Attending: EMERGENCY MEDICINE

## 2023-12-21 ENCOUNTER — HOSPITAL ENCOUNTER (EMERGENCY)
Age: 38
Discharge: HOME | End: 2023-12-21
Attending: EMERGENCY MEDICINE

## 2023-12-21 VITALS
HEART RATE: 68 BPM | OXYGEN SATURATION: 99 % | RESPIRATION RATE: 18 BRPM | BODY MASS INDEX: 35.34 KG/M2 | SYSTOLIC BLOOD PRESSURE: 115 MMHG | TEMPERATURE: 97.7 F | HEIGHT: 65 IN | WEIGHT: 212.08 LBS | DIASTOLIC BLOOD PRESSURE: 97 MMHG

## 2023-12-21 DIAGNOSIS — R10.13 EPIGASTRIC PAIN: Primary | ICD-10-CM

## 2023-12-21 LAB
ALBUMIN SERPL-MCNC: 3.4 G/DL (ref 3.6–5.1)
ALBUMIN/GLOB SERPL: 1.1 {RATIO} (ref 1–2.4)
ALP SERPL-CCNC: 38 UNITS/L (ref 45–117)
ALT SERPL-CCNC: 17 UNITS/L
ANION GAP SERPL CALC-SCNC: 10 MMOL/L (ref 7–19)
APPEARANCE UR: CLEAR
AST SERPL-CCNC: 6 UNITS/L
BASOPHILS # BLD: 0.1 K/MCL (ref 0–0.3)
BASOPHILS NFR BLD: 1 %
BILIRUB SERPL-MCNC: 0.6 MG/DL (ref 0.2–1)
BILIRUB UR QL STRIP: NEGATIVE
BUN SERPL-MCNC: 11 MG/DL (ref 6–20)
BUN/CREAT SERPL: 12 (ref 7–25)
CALCIUM SERPL-MCNC: 8.9 MG/DL (ref 8.4–10.2)
CHLORIDE SERPL-SCNC: 105 MMOL/L (ref 97–110)
CO2 SERPL-SCNC: 24 MMOL/L (ref 21–32)
COLOR UR: COLORLESS
CREAT SERPL-MCNC: 0.9 MG/DL (ref 0.51–0.95)
DEPRECATED RDW RBC: 34.7 FL (ref 39–50)
EGFRCR SERPLBLD CKD-EPI 2021: 84 ML/MIN/{1.73_M2}
EOSINOPHIL # BLD: 0.1 K/MCL (ref 0–0.5)
EOSINOPHIL NFR BLD: 2 %
ERYTHROCYTE [DISTWIDTH] IN BLOOD: 16.5 % (ref 11–15)
FASTING DURATION TIME PATIENT: ABNORMAL H
GLOBULIN SER-MCNC: 3.1 G/DL (ref 2–4)
GLUCOSE SERPL-MCNC: 99 MG/DL (ref 70–99)
GLUCOSE UR STRIP-MCNC: NEGATIVE MG/DL
HCT VFR BLD CALC: 32.7 % (ref 36–46.5)
HGB BLD-MCNC: 10 G/DL (ref 12–15.5)
HGB UR QL STRIP: NEGATIVE
IMM GRANULOCYTES # BLD AUTO: 0 K/MCL (ref 0–0.2)
IMM GRANULOCYTES # BLD: 0 %
KETONES UR STRIP-MCNC: NEGATIVE MG/DL
LACTATE BLDV-SCNC: 1.2 MMOL/L (ref 0–2)
LEUKOCYTE ESTERASE UR QL STRIP: NEGATIVE
LIPASE SERPL-CCNC: 41 UNITS/L (ref 15–77)
LYMPHOCYTES # BLD: 1.7 K/MCL (ref 1–4.8)
LYMPHOCYTES NFR BLD: 27 %
MCH RBC QN AUTO: 18.8 PG (ref 26–34)
MCHC RBC AUTO-ENTMCNC: 30.6 G/DL (ref 32–36.5)
MCV RBC AUTO: 61.6 FL (ref 78–100)
MONOCYTES # BLD: 0.3 K/MCL (ref 0.3–0.9)
MONOCYTES NFR BLD: 4 %
NEUTROPHILS # BLD: 4.3 K/MCL (ref 1.8–7.7)
NEUTROPHILS NFR BLD: 66 %
NITRITE UR QL STRIP: NEGATIVE
NRBC BLD MANUAL-RTO: 0 /100 WBC
PH UR STRIP: 5 [PH] (ref 5–7)
PLATELET # BLD AUTO: 307 K/MCL (ref 140–450)
POTASSIUM SERPL-SCNC: 3.5 MMOL/L (ref 3.4–5.1)
PROT SERPL-MCNC: 6.5 G/DL (ref 6.4–8.2)
PROT UR STRIP-MCNC: NEGATIVE MG/DL
RAINBOW EXTRA TUBES HOLD SPECIMEN: NORMAL
RAINBOW EXTRA TUBES HOLD SPECIMEN: NORMAL
RBC # BLD: 5.31 MIL/MCL (ref 4–5.2)
SODIUM SERPL-SCNC: 135 MMOL/L (ref 135–145)
SP GR UR STRIP: 1.02 (ref 1–1.03)
UROBILINOGEN UR STRIP-MCNC: 0.2 MG/DL
WBC # BLD: 6.5 K/MCL (ref 4.2–11)

## 2023-12-21 PROCEDURE — 83605 ASSAY OF LACTIC ACID: CPT | Performed by: EMERGENCY MEDICINE

## 2023-12-21 PROCEDURE — 85025 COMPLETE CBC W/AUTO DIFF WBC: CPT | Performed by: EMERGENCY MEDICINE

## 2023-12-21 PROCEDURE — 10002801 HB RX 250 W/O HCPCS: Performed by: EMERGENCY MEDICINE

## 2023-12-21 PROCEDURE — 74177 CT ABD & PELVIS W/CONTRAST: CPT

## 2023-12-21 PROCEDURE — 70450 CT HEAD/BRAIN W/O DYE: CPT

## 2023-12-21 PROCEDURE — 80053 COMPREHEN METABOLIC PANEL: CPT | Performed by: EMERGENCY MEDICINE

## 2023-12-21 PROCEDURE — 10002803 HB RX 637: Performed by: EMERGENCY MEDICINE

## 2023-12-21 PROCEDURE — 81003 URINALYSIS AUTO W/O SCOPE: CPT | Performed by: EMERGENCY MEDICINE

## 2023-12-21 PROCEDURE — 83690 ASSAY OF LIPASE: CPT | Performed by: EMERGENCY MEDICINE

## 2023-12-21 PROCEDURE — 10002805 HB CONTRAST AGENT: Performed by: EMERGENCY MEDICINE

## 2023-12-21 RX ORDER — MAGNESIUM HYDROXIDE/ALUMINUM HYDROXICE/SIMETHICONE 120; 1200; 1200 MG/30ML; MG/30ML; MG/30ML
30 SUSPENSION ORAL EVERY 4 HOURS PRN
Qty: 769 ML | Refills: 0 | Status: SHIPPED | OUTPATIENT
Start: 2023-12-21 | End: 2024-01-20

## 2023-12-21 RX ORDER — MAGNESIUM HYDROXIDE/ALUMINUM HYDROXICE/SIMETHICONE 120; 1200; 1200 MG/30ML; MG/30ML; MG/30ML
30 SUSPENSION ORAL ONCE
Status: COMPLETED | OUTPATIENT
Start: 2023-12-21 | End: 2023-12-21

## 2023-12-21 RX ORDER — SUCRALFATE 1 G/1
1 TABLET ORAL 4 TIMES DAILY
Qty: 120 TABLET | Refills: 1 | Status: SHIPPED | OUTPATIENT
Start: 2023-12-21

## 2023-12-21 RX ORDER — SUCRALFATE 1 G/1
1 TABLET ORAL ONCE
Status: COMPLETED | OUTPATIENT
Start: 2023-12-21 | End: 2023-12-21

## 2023-12-21 RX ORDER — FAMOTIDINE 20 MG/1
20 TABLET, FILM COATED ORAL 2 TIMES DAILY
Qty: 60 TABLET | Refills: 1 | Status: SHIPPED | OUTPATIENT
Start: 2023-12-21

## 2023-12-21 RX ORDER — FAMOTIDINE 10 MG/ML
20 INJECTION, SOLUTION INTRAVENOUS ONCE
Status: COMPLETED | OUTPATIENT
Start: 2023-12-21 | End: 2023-12-21

## 2023-12-21 RX ADMIN — FAMOTIDINE 20 MG: 10 INJECTION INTRAVENOUS at 12:54

## 2023-12-21 RX ADMIN — ALUMINUM HYDROXIDE, MAGNESIUM HYDROXIDE, DIMETHICONE 30 ML: 200; 200; 20 LIQUID ORAL at 12:57

## 2023-12-21 RX ADMIN — SUCRALFATE 1 G: 1 TABLET ORAL at 12:56

## 2023-12-21 RX ADMIN — IOHEXOL 75 ML: 350 INJECTION, SOLUTION INTRAVENOUS at 12:15

## 2023-12-23 ASSESSMENT — ENCOUNTER SYMPTOMS
NUMBNESS: 0
ABDOMINAL PAIN: 1
CONFUSION: 1
COUGH: 0
VOMITING: 1
SHORTNESS OF BREATH: 0
WEAKNESS: 0
DIARRHEA: 0
HEADACHES: 0
FEVER: 0

## 2024-01-08 ENCOUNTER — E-ADVICE (OUTPATIENT)
Dept: NEUROLOGY | Age: 39
End: 2024-01-08

## 2024-01-09 ENCOUNTER — TELEPHONE (OUTPATIENT)
Dept: NEUROLOGY | Age: 39
End: 2024-01-09

## 2024-01-16 ENCOUNTER — TELEPHONE (OUTPATIENT)
Dept: NEUROLOGY | Age: 39
End: 2024-01-16

## 2024-01-23 ENCOUNTER — APPOINTMENT (OUTPATIENT)
Dept: NEUROLOGY | Age: 39
End: 2024-01-23

## 2024-01-23 DIAGNOSIS — M79.7 FIBROMYALGIA SYNDROME: ICD-10-CM

## 2024-01-23 DIAGNOSIS — G43.101 MIGRAINE WITH AURA AND WITH STATUS MIGRAINOSUS, NOT INTRACTABLE: ICD-10-CM

## 2024-01-23 DIAGNOSIS — R11.14 BILIOUS VOMITING WITH NAUSEA: Primary | ICD-10-CM

## 2024-01-23 DIAGNOSIS — F44.5 PSYCHOGENIC NONEPILEPTIC SEIZURE: ICD-10-CM

## 2024-01-23 PROCEDURE — G2211 COMPLEX E/M VISIT ADD ON: HCPCS | Performed by: PSYCHIATRY & NEUROLOGY

## 2024-01-23 PROCEDURE — 99215 OFFICE O/P EST HI 40 MIN: CPT | Performed by: PSYCHIATRY & NEUROLOGY

## 2024-01-23 RX ORDER — OXYCODONE HYDROCHLORIDE AND ACETAMINOPHEN 5; 325 MG/1; MG/1
1 TABLET ORAL EVERY 4 HOURS PRN
COMMUNITY
Start: 2023-11-08 | End: 2024-01-23

## 2024-01-23 RX ORDER — PSEUDOEPHEDRINE HCL 30 MG
100 TABLET ORAL
COMMUNITY
Start: 2023-11-08

## 2024-01-23 RX ORDER — ONDANSETRON 4 MG/1
1 TABLET, FILM COATED ORAL EVERY 8 HOURS PRN
COMMUNITY
Start: 2023-11-08 | End: 2024-01-23 | Stop reason: SDUPTHER

## 2024-01-23 RX ORDER — IBUPROFEN 200 MG
1 TABLET ORAL EVERY 6 HOURS PRN
COMMUNITY
Start: 2023-11-08 | End: 2024-01-23 | Stop reason: SINTOL

## 2024-01-30 ENCOUNTER — E-ADVICE (OUTPATIENT)
Dept: NEUROLOGY | Age: 39
End: 2024-01-30

## 2024-01-31 ENCOUNTER — APPOINTMENT (OUTPATIENT)
Dept: BEHAVIORAL HEALTH | Age: 39
End: 2024-01-31

## 2024-01-31 DIAGNOSIS — F41.9 ANXIETY: ICD-10-CM

## 2024-01-31 DIAGNOSIS — F43.10 PTSD (POST-TRAUMATIC STRESS DISORDER): ICD-10-CM

## 2024-01-31 DIAGNOSIS — F51.01 PRIMARY INSOMNIA: ICD-10-CM

## 2024-01-31 DIAGNOSIS — F31.32 BIPOLAR AFFECTIVE DISORDER, CURRENTLY DEPRESSED, MODERATE (CMD): Primary | ICD-10-CM

## 2024-01-31 PROCEDURE — 99214 OFFICE O/P EST MOD 30 MIN: CPT | Performed by: PSYCHIATRY & NEUROLOGY

## 2024-01-31 RX ORDER — LAMOTRIGINE 150 MG/1
150 TABLET ORAL 2 TIMES DAILY
Qty: 60 TABLET | Refills: 0 | Status: SHIPPED | OUTPATIENT
Start: 2024-01-31

## 2024-01-31 RX ORDER — PRAZOSIN HYDROCHLORIDE 2 MG/1
2 CAPSULE ORAL DAILY
Qty: 30 CAPSULE | Refills: 0 | Status: SHIPPED | OUTPATIENT
Start: 2024-01-31

## 2024-01-31 RX ORDER — HYDROXYZINE 50 MG/1
50 TABLET, FILM COATED ORAL 2 TIMES DAILY PRN
Qty: 60 TABLET | Refills: 0 | Status: SHIPPED | OUTPATIENT
Start: 2024-01-31

## 2024-01-31 RX ORDER — PRAZOSIN HYDROCHLORIDE 5 MG/1
10 CAPSULE ORAL NIGHTLY
Qty: 60 CAPSULE | Refills: 0 | Status: SHIPPED | OUTPATIENT
Start: 2024-01-31

## 2024-01-31 RX ORDER — TRAZODONE HYDROCHLORIDE 100 MG/1
100-200 TABLET ORAL NIGHTLY PRN
Qty: 60 TABLET | Refills: 0 | Status: SHIPPED | OUTPATIENT
Start: 2024-01-31

## 2024-02-05 ENCOUNTER — TELEPHONE (OUTPATIENT)
Dept: NEUROLOGY | Age: 39
End: 2024-02-05

## 2024-02-05 DIAGNOSIS — G43.101 MIGRAINE WITH AURA AND WITH STATUS MIGRAINOSUS, NOT INTRACTABLE: Primary | ICD-10-CM

## 2024-02-06 RX ORDER — PREDNISONE 10 MG/1
TABLET ORAL
Qty: 60 TABLET | Refills: 0 | Status: SHIPPED | OUTPATIENT
Start: 2024-02-06

## 2024-02-07 ENCOUNTER — APPOINTMENT (OUTPATIENT)
Dept: LAB | Age: 39
End: 2024-02-07

## 2024-02-15 ENCOUNTER — TELEPHONE (OUTPATIENT)
Dept: NEUROLOGY | Age: 39
End: 2024-02-15

## 2024-02-22 ENCOUNTER — APPOINTMENT (OUTPATIENT)
Dept: NEUROLOGY | Age: 39
End: 2024-02-22

## 2024-02-22 VITALS
HEART RATE: 79 BPM | DIASTOLIC BLOOD PRESSURE: 73 MMHG | WEIGHT: 210.54 LBS | OXYGEN SATURATION: 98 % | SYSTOLIC BLOOD PRESSURE: 108 MMHG | HEIGHT: 65 IN | BODY MASS INDEX: 35.08 KG/M2

## 2024-02-22 DIAGNOSIS — G43.101 MIGRAINE WITH AURA AND WITH STATUS MIGRAINOSUS, NOT INTRACTABLE: ICD-10-CM

## 2024-02-22 DIAGNOSIS — M79.7 FIBROMYALGIA SYNDROME: ICD-10-CM

## 2024-02-22 DIAGNOSIS — F44.5 PSYCHOGENIC NONEPILEPTIC SEIZURE: ICD-10-CM

## 2024-02-22 DIAGNOSIS — R56.9 SEIZURE (CMD): Primary | ICD-10-CM

## 2024-02-22 PROCEDURE — G2211 COMPLEX E/M VISIT ADD ON: HCPCS | Performed by: PSYCHIATRY & NEUROLOGY

## 2024-02-22 PROCEDURE — 99215 OFFICE O/P EST HI 40 MIN: CPT | Performed by: PSYCHIATRY & NEUROLOGY

## 2024-02-22 ASSESSMENT — PAIN SCALES - GENERAL: PAINLEVEL: 6

## 2024-02-26 ENCOUNTER — TELEPHONE (OUTPATIENT)
Dept: BEHAVIORAL HEALTH | Age: 39
End: 2024-02-26

## 2024-02-26 ENCOUNTER — E-ADVICE (OUTPATIENT)
Dept: NEUROLOGY | Age: 39
End: 2024-02-26

## 2024-02-27 ENCOUNTER — HOSPITAL ENCOUNTER (EMERGENCY)
Age: 39
Discharge: HOME | End: 2024-02-27
Attending: EMERGENCY MEDICINE

## 2024-02-27 VITALS
DIASTOLIC BLOOD PRESSURE: 82 MMHG | HEART RATE: 84 BPM | TEMPERATURE: 98.1 F | SYSTOLIC BLOOD PRESSURE: 121 MMHG | WEIGHT: 209.22 LBS | BODY MASS INDEX: 34.82 KG/M2 | RESPIRATION RATE: 16 BRPM | OXYGEN SATURATION: 96 %

## 2024-02-27 DIAGNOSIS — R56.9 SEIZURE-LIKE ACTIVITY (CMD): Primary | ICD-10-CM

## 2024-02-27 LAB
ALBUMIN SERPL-MCNC: 3.6 G/DL (ref 3.6–5.1)
ALBUMIN/GLOB SERPL: 1 {RATIO} (ref 1–2.4)
ALP SERPL-CCNC: 44 UNITS/L (ref 45–117)
ALT SERPL-CCNC: 28 UNITS/L
ANION GAP SERPL CALC-SCNC: 5 MMOL/L (ref 7–19)
AST SERPL-CCNC: 12 UNITS/L
BASOPHILS # BLD: 0.1 K/MCL (ref 0–0.3)
BASOPHILS NFR BLD: 1 %
BILIRUB SERPL-MCNC: 0.6 MG/DL (ref 0.2–1)
BUN SERPL-MCNC: 9 MG/DL (ref 6–20)
BUN/CREAT SERPL: 10 (ref 7–25)
CALCIUM SERPL-MCNC: 9.3 MG/DL (ref 8.4–10.2)
CHLORIDE SERPL-SCNC: 109 MMOL/L (ref 97–110)
CO2 SERPL-SCNC: 24 MMOL/L (ref 21–32)
CREAT SERPL-MCNC: 0.87 MG/DL (ref 0.51–0.95)
DEPRECATED RDW RBC: 37.4 FL (ref 39–50)
EGFRCR SERPLBLD CKD-EPI 2021: 87 ML/MIN/{1.73_M2}
EOSINOPHIL # BLD: 0.1 K/MCL (ref 0–0.5)
EOSINOPHIL NFR BLD: 2 %
ERYTHROCYTE [DISTWIDTH] IN BLOOD: 18.6 % (ref 11–15)
ETHANOL SERPL-MCNC: NORMAL MG/DL
FASTING DURATION TIME PATIENT: ABNORMAL H
GLOBULIN SER-MCNC: 3.7 G/DL (ref 2–4)
GLUCOSE SERPL-MCNC: 103 MG/DL (ref 70–99)
HCT VFR BLD CALC: 35.5 % (ref 36–46.5)
HGB BLD-MCNC: 11 G/DL (ref 12–15.5)
IMM GRANULOCYTES # BLD AUTO: 0 K/MCL (ref 0–0.2)
IMM GRANULOCYTES # BLD: 0 %
LYMPHOCYTES # BLD: 1.4 K/MCL (ref 1–4.8)
LYMPHOCYTES NFR BLD: 17 %
MAGNESIUM SERPL-MCNC: 2 MG/DL (ref 1.7–2.4)
MCH RBC QN AUTO: 19.4 PG (ref 26–34)
MCHC RBC AUTO-ENTMCNC: 31 G/DL (ref 32–36.5)
MCV RBC AUTO: 62.5 FL (ref 78–100)
MONOCYTES # BLD: 0.3 K/MCL (ref 0.3–0.9)
MONOCYTES NFR BLD: 3 %
NEUTROPHILS # BLD: 6.4 K/MCL (ref 1.8–7.7)
NEUTROPHILS NFR BLD: 77 %
NRBC BLD MANUAL-RTO: 0 /100 WBC
PLATELET # BLD AUTO: 334 K/MCL (ref 140–450)
POTASSIUM SERPL-SCNC: 3.9 MMOL/L (ref 3.4–5.1)
PROT SERPL-MCNC: 7.3 G/DL (ref 6.4–8.2)
RAINBOW EXTRA TUBES HOLD SPECIMEN: NORMAL
RAINBOW EXTRA TUBES HOLD SPECIMEN: NORMAL
RBC # BLD: 5.68 MIL/MCL (ref 4–5.2)
SODIUM SERPL-SCNC: 134 MMOL/L (ref 135–145)
WBC # BLD: 8.3 K/MCL (ref 4.2–11)

## 2024-02-27 PROCEDURE — 85025 COMPLETE CBC W/AUTO DIFF WBC: CPT | Performed by: EMERGENCY MEDICINE

## 2024-02-27 PROCEDURE — 82077 ASSAY SPEC XCP UR&BREATH IA: CPT | Performed by: EMERGENCY MEDICINE

## 2024-02-27 PROCEDURE — 99283 EMERGENCY DEPT VISIT LOW MDM: CPT

## 2024-02-27 PROCEDURE — 83735 ASSAY OF MAGNESIUM: CPT | Performed by: EMERGENCY MEDICINE

## 2024-02-27 PROCEDURE — 36415 COLL VENOUS BLD VENIPUNCTURE: CPT

## 2024-02-27 PROCEDURE — 80053 COMPREHEN METABOLIC PANEL: CPT | Performed by: EMERGENCY MEDICINE

## 2024-02-27 RX ORDER — LORAZEPAM 2 MG/ML
INJECTION INTRAMUSCULAR
Status: DISCONTINUED
Start: 2024-02-27 | End: 2024-02-27 | Stop reason: WASHOUT

## 2024-02-27 SDOH — SOCIAL STABILITY: SOCIAL INSECURITY: HOW OFTEN DOES ANYONE, INCLUDING FAMILY AND FRIENDS, INSULT OR TALK DOWN TO YOU?: NEVER

## 2024-02-27 SDOH — SOCIAL STABILITY: SOCIAL INSECURITY: HOW OFTEN DOES ANYONE, INCLUDING FAMILY AND FRIENDS, THREATEN YOU WITH HARM?: NEVER

## 2024-02-27 SDOH — SOCIAL STABILITY: SOCIAL INSECURITY: HOW OFTEN DOES ANYONE, INCLUDING FAMILY AND FRIENDS, PHYSICALLY HURT YOU?: NEVER

## 2024-02-27 SDOH — SOCIAL STABILITY: SOCIAL INSECURITY: HOW OFTEN DOES ANYONE, INCLUDING FAMILY AND FRIENDS, SCREAM OR CURSE AT YOU?: NEVER

## 2024-02-27 ASSESSMENT — ENCOUNTER SYMPTOMS
AGITATION: 0
ABDOMINAL PAIN: 0
SEIZURES: 1
NAUSEA: 0
SHORTNESS OF BREATH: 0
HEADACHES: 0
SORE THROAT: 0
FEVER: 0
CHILLS: 0
COUGH: 0
VOMITING: 0

## 2024-02-27 ASSESSMENT — PAIN SCALES - GENERAL: PAINLEVEL_OUTOF10: 0

## 2024-02-28 ENCOUNTER — TELEPHONE (OUTPATIENT)
Dept: NEUROLOGY | Age: 39
End: 2024-02-28

## 2024-02-28 ENCOUNTER — BEHAVIORAL HEALTH (OUTPATIENT)
Dept: BEHAVIORAL HEALTH | Age: 39
End: 2024-02-28

## 2024-02-28 DIAGNOSIS — F43.10 PTSD (POST-TRAUMATIC STRESS DISORDER): ICD-10-CM

## 2024-02-28 DIAGNOSIS — F31.32 BIPOLAR AFFECTIVE DISORDER, CURRENTLY DEPRESSED, MODERATE (CMD): Primary | ICD-10-CM

## 2024-02-28 DIAGNOSIS — F41.9 ANXIETY: ICD-10-CM

## 2024-02-28 DIAGNOSIS — F51.01 PRIMARY INSOMNIA: ICD-10-CM

## 2024-02-28 PROCEDURE — 99214 OFFICE O/P EST MOD 30 MIN: CPT | Performed by: PSYCHIATRY & NEUROLOGY

## 2024-02-28 RX ORDER — TRAZODONE HYDROCHLORIDE 100 MG/1
100-200 TABLET ORAL NIGHTLY PRN
Qty: 60 TABLET | Refills: 0 | Status: SHIPPED | OUTPATIENT
Start: 2024-02-28

## 2024-02-28 RX ORDER — LAMOTRIGINE 150 MG/1
150 TABLET ORAL 2 TIMES DAILY
Qty: 60 TABLET | Refills: 0 | Status: SHIPPED | OUTPATIENT
Start: 2024-02-28

## 2024-02-28 RX ORDER — HYDROXYZINE 50 MG/1
50 TABLET, FILM COATED ORAL 2 TIMES DAILY PRN
Qty: 60 TABLET | Refills: 0 | Status: SHIPPED | OUTPATIENT
Start: 2024-02-28

## 2024-02-28 RX ORDER — PRAZOSIN HYDROCHLORIDE 5 MG/1
CAPSULE ORAL
Qty: 90 CAPSULE | Refills: 0 | Status: SHIPPED | OUTPATIENT
Start: 2024-02-28

## 2024-03-04 DIAGNOSIS — F51.01 PRIMARY INSOMNIA: ICD-10-CM

## 2024-03-04 DIAGNOSIS — F41.9 ANXIETY: ICD-10-CM

## 2024-03-04 RX ORDER — TRAZODONE HYDROCHLORIDE 100 MG/1
100-200 TABLET ORAL NIGHTLY PRN
Qty: 60 TABLET | Refills: 0 | Status: SHIPPED | OUTPATIENT
Start: 2024-03-04

## 2024-03-04 RX ORDER — HYDROXYZINE 50 MG/1
50 TABLET, FILM COATED ORAL 2 TIMES DAILY PRN
Qty: 60 TABLET | Refills: 0 | Status: SHIPPED | OUTPATIENT
Start: 2024-03-04

## 2024-03-05 ENCOUNTER — E-ADVICE (OUTPATIENT)
Dept: BEHAVIORAL HEALTH | Age: 39
End: 2024-03-05

## 2024-03-05 DIAGNOSIS — F43.10 PTSD (POST-TRAUMATIC STRESS DISORDER): ICD-10-CM

## 2024-03-06 RX ORDER — PRAZOSIN HYDROCHLORIDE 5 MG/1
10 CAPSULE ORAL NIGHTLY
Qty: 60 CAPSULE | Refills: 0 | OUTPATIENT
Start: 2024-03-06

## 2024-03-06 RX ORDER — PRAZOSIN HYDROCHLORIDE 2 MG/1
2 CAPSULE ORAL DAILY
Qty: 30 CAPSULE | Refills: 0 | OUTPATIENT
Start: 2024-03-06

## 2024-03-11 ENCOUNTER — TELEPHONE (OUTPATIENT)
Dept: NEUROLOGY | Age: 39
End: 2024-03-11

## 2024-03-12 DIAGNOSIS — F43.10 PTSD (POST-TRAUMATIC STRESS DISORDER): ICD-10-CM

## 2024-03-12 RX ORDER — PRAZOSIN HYDROCHLORIDE 5 MG/1
CAPSULE ORAL
Qty: 90 CAPSULE | Refills: 0 | Status: SHIPPED | OUTPATIENT
Start: 2024-03-12

## 2024-03-12 RX ORDER — PRAZOSIN HYDROCHLORIDE 5 MG/1
10 CAPSULE ORAL NIGHTLY
Qty: 60 CAPSULE | Refills: 0 | Status: SHIPPED | OUTPATIENT
Start: 2024-03-12 | End: 2024-03-12 | Stop reason: SDUPTHER

## 2024-03-25 ENCOUNTER — HOSPITAL ENCOUNTER (INPATIENT)
Age: 39
LOS: 3 days | Discharge: HOME | DRG: 756 | End: 2024-03-28
Attending: PSYCHIATRY & NEUROLOGY | Admitting: INTERNAL MEDICINE

## 2024-03-25 ENCOUNTER — APPOINTMENT (OUTPATIENT)
Dept: NEUROLOGY | Age: 39
DRG: 756 | End: 2024-03-25
Attending: PSYCHIATRY & NEUROLOGY

## 2024-03-25 DIAGNOSIS — F51.01 PRIMARY INSOMNIA: ICD-10-CM

## 2024-03-25 DIAGNOSIS — F17.200 SMOKER: Primary | ICD-10-CM

## 2024-03-25 DIAGNOSIS — R56.9 SEIZURE (CMD): ICD-10-CM

## 2024-03-25 DIAGNOSIS — F41.9 ANXIETY: ICD-10-CM

## 2024-03-25 DIAGNOSIS — G43.101 MIGRAINE WITH AURA AND WITH STATUS MIGRAINOSUS, NOT INTRACTABLE: ICD-10-CM

## 2024-03-25 LAB
ALBUMIN SERPL-MCNC: 3.3 G/DL (ref 3.6–5.1)
ALP SERPL-CCNC: 45 UNITS/L (ref 45–117)
ALT SERPL-CCNC: 19 UNITS/L
ANION GAP SERPL CALC-SCNC: 9 MMOL/L (ref 7–19)
AST SERPL-CCNC: 12 UNITS/L
ATRIAL RATE (BPM): 68
BASOPHILS # BLD: 0.1 K/MCL (ref 0–0.3)
BASOPHILS NFR BLD: 1 %
BILIRUB CONJ SERPL-MCNC: <0.1 MG/DL (ref 0–0.2)
BILIRUB SERPL-MCNC: 0.4 MG/DL (ref 0.2–1)
BUN SERPL-MCNC: 6 MG/DL (ref 6–20)
BUN/CREAT SERPL: 8 (ref 7–25)
CALCIUM SERPL-MCNC: 8.6 MG/DL (ref 8.4–10.2)
CHLORIDE SERPL-SCNC: 114 MMOL/L (ref 97–110)
CO2 SERPL-SCNC: 21 MMOL/L (ref 21–32)
CREAT SERPL-MCNC: 0.73 MG/DL (ref 0.51–0.95)
DEPRECATED RDW RBC: 34.9 FL (ref 39–50)
EGFRCR SERPLBLD CKD-EPI 2021: >90 ML/MIN/{1.73_M2}
EOSINOPHIL # BLD: 0.1 K/MCL (ref 0–0.5)
EOSINOPHIL NFR BLD: 1 %
ERYTHROCYTE [DISTWIDTH] IN BLOOD: 16.9 % (ref 11–15)
FASTING DURATION TIME PATIENT: ABNORMAL H
GLUCOSE SERPL-MCNC: 92 MG/DL (ref 70–99)
HCT VFR BLD CALC: 34.6 % (ref 36–46.5)
HGB BLD-MCNC: 10.8 G/DL (ref 12–15.5)
IMM GRANULOCYTES # BLD AUTO: 0 K/MCL (ref 0–0.2)
IMM GRANULOCYTES # BLD: 0 %
LYMPHOCYTES # BLD: 2.2 K/MCL (ref 1–4.8)
LYMPHOCYTES NFR BLD: 26 %
MAGNESIUM SERPL-MCNC: 1.9 MG/DL (ref 1.7–2.4)
MCH RBC QN AUTO: 19.5 PG (ref 26–34)
MCHC RBC AUTO-ENTMCNC: 31.2 G/DL (ref 32–36.5)
MCV RBC AUTO: 62.5 FL (ref 78–100)
MONOCYTES # BLD: 0.4 K/MCL (ref 0.3–0.9)
MONOCYTES NFR BLD: 4 %
NEUTROPHILS # BLD: 5.6 K/MCL (ref 1.8–7.7)
NEUTROPHILS NFR BLD: 68 %
NRBC BLD MANUAL-RTO: 0 /100 WBC
P AXIS (DEGREES): 46
PLATELET # BLD AUTO: 288 K/MCL (ref 140–450)
POTASSIUM SERPL-SCNC: 3.6 MMOL/L (ref 3.4–5.1)
PR-INTERVAL (MSEC): 157
PROT SERPL-MCNC: 6.6 G/DL (ref 6.4–8.2)
QRS-INTERVAL (MSEC): 92
QT-INTERVAL (MSEC): 422
QTC: 452
R AXIS (DEGREES): 80
RBC # BLD: 5.54 MIL/MCL (ref 4–5.2)
REPORT TEXT: NORMAL
SODIUM SERPL-SCNC: 140 MMOL/L (ref 135–145)
T AXIS (DEGREES): 41
VENTRICULAR RATE EKG/MIN (BPM): 69
WBC # BLD: 8.4 K/MCL (ref 4.2–11)

## 2024-03-25 PROCEDURE — 10004651 HB RX, NO CHARGE ITEM: Performed by: INTERNAL MEDICINE

## 2024-03-25 PROCEDURE — 83735 ASSAY OF MAGNESIUM: CPT | Performed by: INTERNAL MEDICINE

## 2024-03-25 PROCEDURE — 10002803 HB RX 637: Performed by: INTERNAL MEDICINE

## 2024-03-25 PROCEDURE — 10002800 HB RX 250 W HCPCS: Performed by: INTERNAL MEDICINE

## 2024-03-25 PROCEDURE — 96372 THER/PROPH/DIAG INJ SC/IM: CPT | Performed by: INTERNAL MEDICINE

## 2024-03-25 PROCEDURE — 80048 BASIC METABOLIC PNL TOTAL CA: CPT | Performed by: INTERNAL MEDICINE

## 2024-03-25 PROCEDURE — 10006031 HB ROOM CHARGE TELEMETRY

## 2024-03-25 PROCEDURE — 99223 1ST HOSP IP/OBS HIGH 75: CPT | Performed by: INTERNAL MEDICINE

## 2024-03-25 PROCEDURE — 95720 EEG PHY/QHP EA INCR W/VEEG: CPT | Performed by: PSYCHIATRY & NEUROLOGY

## 2024-03-25 PROCEDURE — 93005 ELECTROCARDIOGRAM TRACING: CPT | Performed by: INTERNAL MEDICINE

## 2024-03-25 PROCEDURE — 99223 1ST HOSP IP/OBS HIGH 75: CPT | Performed by: PSYCHIATRY & NEUROLOGY

## 2024-03-25 PROCEDURE — 93010 ELECTROCARDIOGRAM REPORT: CPT | Performed by: INTERNAL MEDICINE

## 2024-03-25 PROCEDURE — 10002803 HB RX 637: Performed by: PSYCHIATRY & NEUROLOGY

## 2024-03-25 PROCEDURE — 36415 COLL VENOUS BLD VENIPUNCTURE: CPT | Performed by: INTERNAL MEDICINE

## 2024-03-25 PROCEDURE — 85025 COMPLETE CBC W/AUTO DIFF WBC: CPT | Performed by: INTERNAL MEDICINE

## 2024-03-25 PROCEDURE — 80076 HEPATIC FUNCTION PANEL: CPT | Performed by: INTERNAL MEDICINE

## 2024-03-25 PROCEDURE — 95700 EEG CONT REC W/VID EEG TECH: CPT

## 2024-03-25 PROCEDURE — 10002800 HB RX 250 W HCPCS

## 2024-03-25 RX ORDER — ONDANSETRON 2 MG/ML
4 INJECTION INTRAMUSCULAR; INTRAVENOUS EVERY 8 HOURS PRN
Status: DISCONTINUED | OUTPATIENT
Start: 2024-03-25 | End: 2024-03-28 | Stop reason: HOSPADM

## 2024-03-25 RX ORDER — 0.9 % SODIUM CHLORIDE 0.9 %
2 VIAL (ML) INJECTION EVERY 12 HOURS SCHEDULED
Status: DISCONTINUED | OUTPATIENT
Start: 2024-03-25 | End: 2024-03-28 | Stop reason: HOSPADM

## 2024-03-25 RX ORDER — PRAZOSIN HYDROCHLORIDE 5 MG/1
5 CAPSULE ORAL NIGHTLY
Status: DISCONTINUED | OUTPATIENT
Start: 2024-03-25 | End: 2024-03-25

## 2024-03-25 RX ORDER — TRAZODONE HYDROCHLORIDE 50 MG/1
200 TABLET ORAL NIGHTLY
Status: DISCONTINUED | OUTPATIENT
Start: 2024-03-25 | End: 2024-03-28 | Stop reason: HOSPADM

## 2024-03-25 RX ORDER — TOPIRAMATE 25 MG/1
100 TABLET ORAL EVERY 12 HOURS SCHEDULED
Status: DISCONTINUED | OUTPATIENT
Start: 2024-03-25 | End: 2024-03-25

## 2024-03-25 RX ORDER — ONDANSETRON 4 MG/1
4 TABLET, ORALLY DISINTEGRATING ORAL EVERY 8 HOURS PRN
Status: DISCONTINUED | OUTPATIENT
Start: 2024-03-25 | End: 2024-03-28 | Stop reason: HOSPADM

## 2024-03-25 RX ORDER — NICOTINE 21 MG/24HR
1 PATCH, TRANSDERMAL 24 HOURS TRANSDERMAL DAILY
Status: DISCONTINUED | OUTPATIENT
Start: 2024-03-25 | End: 2024-03-28 | Stop reason: HOSPADM

## 2024-03-25 RX ORDER — HYDROXYZINE HYDROCHLORIDE 25 MG/1
50 TABLET, FILM COATED ORAL DAILY PRN
Status: DISCONTINUED | OUTPATIENT
Start: 2024-03-25 | End: 2024-03-25

## 2024-03-25 RX ORDER — TOPIRAMATE 25 MG/1
100 TABLET ORAL EVERY 12 HOURS SCHEDULED
Status: DISCONTINUED | OUTPATIENT
Start: 2024-03-25 | End: 2024-03-27

## 2024-03-25 RX ORDER — HYDROXYZINE HYDROCHLORIDE 25 MG/1
50 TABLET, FILM COATED ORAL 2 TIMES DAILY
Status: DISCONTINUED | OUTPATIENT
Start: 2024-03-25 | End: 2024-03-28 | Stop reason: HOSPADM

## 2024-03-25 RX ORDER — ACETAMINOPHEN 325 MG/1
650 TABLET ORAL EVERY 4 HOURS PRN
Status: DISCONTINUED | OUTPATIENT
Start: 2024-03-25 | End: 2024-03-28 | Stop reason: HOSPADM

## 2024-03-25 RX ORDER — ONDANSETRON 2 MG/ML
INJECTION INTRAMUSCULAR; INTRAVENOUS
Status: COMPLETED
Start: 2024-03-25 | End: 2024-03-25

## 2024-03-25 RX ORDER — PROPRANOLOL HCL 60 MG
60 CAPSULE, EXTENDED RELEASE 24HR ORAL DAILY
Status: DISCONTINUED | OUTPATIENT
Start: 2024-03-26 | End: 2024-03-28 | Stop reason: HOSPADM

## 2024-03-25 RX ORDER — PRAZOSIN HYDROCHLORIDE 5 MG/1
5 CAPSULE ORAL DAILY
Status: DISCONTINUED | OUTPATIENT
Start: 2024-03-26 | End: 2024-03-28 | Stop reason: HOSPADM

## 2024-03-25 RX ORDER — SUMATRIPTAN 50 MG/1
100 TABLET, FILM COATED ORAL DAILY PRN
Status: DISCONTINUED | OUTPATIENT
Start: 2024-03-25 | End: 2024-03-28 | Stop reason: HOSPADM

## 2024-03-25 RX ORDER — ENOXAPARIN SODIUM 100 MG/ML
40 INJECTION SUBCUTANEOUS NIGHTLY
Status: DISCONTINUED | OUTPATIENT
Start: 2024-03-25 | End: 2024-03-28 | Stop reason: HOSPADM

## 2024-03-25 RX ORDER — PRAZOSIN HYDROCHLORIDE 5 MG/1
10 CAPSULE ORAL NIGHTLY
Status: DISCONTINUED | OUTPATIENT
Start: 2024-03-25 | End: 2024-03-28 | Stop reason: HOSPADM

## 2024-03-25 RX ORDER — HYDROXYZINE HYDROCHLORIDE 25 MG/1
50 TABLET, FILM COATED ORAL 2 TIMES DAILY PRN
Status: DISCONTINUED | OUTPATIENT
Start: 2024-03-25 | End: 2024-03-25

## 2024-03-25 RX ORDER — TRAZODONE HYDROCHLORIDE 50 MG/1
100-200 TABLET ORAL NIGHTLY PRN
Status: DISCONTINUED | OUTPATIENT
Start: 2024-03-25 | End: 2024-03-25

## 2024-03-25 RX ORDER — ALBUTEROL SULFATE 90 UG/1
2 AEROSOL, METERED RESPIRATORY (INHALATION) EVERY 4 HOURS PRN
Status: DISCONTINUED | OUTPATIENT
Start: 2024-03-25 | End: 2024-03-28 | Stop reason: HOSPADM

## 2024-03-25 RX ORDER — ACETAMINOPHEN 650 MG/1
650 SUPPOSITORY RECTAL EVERY 4 HOURS PRN
Status: DISCONTINUED | OUTPATIENT
Start: 2024-03-25 | End: 2024-03-28 | Stop reason: HOSPADM

## 2024-03-25 RX ADMIN — ACETAMINOPHEN 650 MG: 325 TABLET ORAL at 23:35

## 2024-03-25 RX ADMIN — ONDANSETRON 4 MG: 4 TABLET, ORALLY DISINTEGRATING ORAL at 15:25

## 2024-03-25 RX ADMIN — ENOXAPARIN SODIUM 40 MG: 100 INJECTION SUBCUTANEOUS at 23:38

## 2024-03-25 RX ADMIN — ONDANSETRON 4 MG: 2 INJECTION INTRAMUSCULAR; INTRAVENOUS at 21:18

## 2024-03-25 RX ADMIN — LAMOTRIGINE 150 MG: 100 TABLET ORAL at 23:36

## 2024-03-25 RX ADMIN — TRAZODONE HYDROCHLORIDE 200 MG: 50 TABLET ORAL at 23:37

## 2024-03-25 RX ADMIN — Medication 1 PATCH: at 15:26

## 2024-03-25 RX ADMIN — HYDROXYZINE HYDROCHLORIDE 50 MG: 25 TABLET ORAL at 23:37

## 2024-03-25 RX ADMIN — GABAPENTIN 400 MG: 300 CAPSULE ORAL at 23:40

## 2024-03-25 RX ADMIN — SODIUM CHLORIDE, PRESERVATIVE FREE 2 ML: 5 INJECTION INTRAVENOUS at 23:39

## 2024-03-25 RX ADMIN — GABAPENTIN 400 MG: 300 CAPSULE ORAL at 16:05

## 2024-03-25 RX ADMIN — GABAPENTIN 400 MG: 300 CAPSULE ORAL at 18:43

## 2024-03-25 RX ADMIN — SODIUM CHLORIDE, PRESERVATIVE FREE 2 ML: 5 INJECTION INTRAVENOUS at 13:42

## 2024-03-25 RX ADMIN — TOPIRAMATE 100 MG: 25 TABLET ORAL at 23:36

## 2024-03-25 SDOH — ECONOMIC STABILITY: FOOD INSECURITY: WITHIN THE PAST 12 MONTHS, THE FOOD YOU BOUGHT JUST DIDN'T LAST AND YOU DIDN'T HAVE MONEY TO GET MORE.: NEVER TRUE

## 2024-03-25 SDOH — ECONOMIC STABILITY: GENERAL

## 2024-03-25 SDOH — SOCIAL STABILITY: SOCIAL NETWORK: SUPPORT SYSTEMS: FAMILY MEMBERS

## 2024-03-25 SDOH — ECONOMIC STABILITY: INCOME INSECURITY: IN THE PAST 12 MONTHS, HAS THE ELECTRIC, GAS, OIL, OR WATER COMPANY THREATENED TO SHUT OFF SERVICE IN YOUR HOME?: NO

## 2024-03-25 SDOH — ECONOMIC STABILITY: GENERAL: WOULD YOU LIKE HELP WITH ANY OF THE FOLLOWING NEEDS?: TRANSPORTATION

## 2024-03-25 SDOH — SOCIAL STABILITY: SOCIAL INSECURITY: HOW OFTEN DOES ANYONE, INCLUDING FAMILY AND FRIENDS, THREATEN YOU WITH HARM?: SOMETIMES

## 2024-03-25 SDOH — HEALTH STABILITY: GENERAL: BECAUSE OF A PHYSICAL, MENTAL, OR EMOTIONAL CONDITION, DO YOU HAVE DIFFICULTY DOING ERRANDS ALONE?: YES

## 2024-03-25 SDOH — HEALTH STABILITY: PHYSICAL HEALTH: DO YOU HAVE SERIOUS DIFFICULTY WALKING OR CLIMBING STAIRS?: YES

## 2024-03-25 SDOH — ECONOMIC STABILITY: HOUSING INSECURITY: WHAT IS YOUR LIVING SITUATION TODAY?: APARTMENT

## 2024-03-25 SDOH — HEALTH STABILITY: PHYSICAL HEALTH: DO YOU HAVE DIFFICULTY DRESSING OR BATHING?: NO

## 2024-03-25 SDOH — ECONOMIC STABILITY: HOUSING INSECURITY: DO YOU HAVE PROBLEMS WITH ANY OF THE FOLLOWING?: PATIENT DECLINED

## 2024-03-25 SDOH — ECONOMIC STABILITY: HOUSING INSECURITY: WHAT IS YOUR LIVING SITUATION TODAY?: I HAVE A STEADY PLACE TO LIVE

## 2024-03-25 SDOH — SOCIAL STABILITY: SOCIAL INSECURITY: HOW OFTEN DOES ANYONE, INCLUDING FAMILY AND FRIENDS, INSULT OR TALK DOWN TO YOU?: FAIRLY OFTEN

## 2024-03-25 SDOH — ECONOMIC STABILITY: TRANSPORTATION INSECURITY
IN THE PAST 12 MONTHS, HAS LACK OF RELIABLE TRANSPORTATION KEPT YOU FROM MEDICAL APPOINTMENTS, MEETINGS, WORK OR FROM GETTING THINGS NEEDED FOR DAILY LIVING?: YES

## 2024-03-25 SDOH — SOCIAL STABILITY: SOCIAL INSECURITY: HOW OFTEN DOES ANYONE, INCLUDING FAMILY AND FRIENDS, SCREAM OR CURSE AT YOU?: FREQUENTLY

## 2024-03-25 SDOH — SOCIAL STABILITY: SOCIAL INSECURITY: HOW OFTEN DOES ANYONE, INCLUDING FAMILY AND FRIENDS, PHYSICALLY HURT YOU?: RARELY

## 2024-03-25 SDOH — ECONOMIC STABILITY: HOUSING INSECURITY: WHAT IS YOUR LIVING SITUATION TODAY?: SPOUSE

## 2024-03-25 ASSESSMENT — PATIENT HEALTH QUESTIONNAIRE - PHQ9
SUM OF ALL RESPONSES TO PHQ9 QUESTIONS 1 AND 2: 4
2. FEELING DOWN, DEPRESSED OR HOPELESS: MORE THAN HALF THE DAYS
4. FEELING TIRED OR HAVING LITTLE ENERGY: NEARLY EVERY DAY
CLINICAL INTERPRETATION OF PHQ2 SCORE: FURTHER SCREENING NEEDED
3. TROUBLE FALLING OR STAYING ASLEEP OR SLEEPING TOO MUCH: NEARLY EVERY DAY
9. THOUGHTS THAT YOU WOULD BE BETTER OFF DEAD, OR OF HURTING YOURSELF: NOT AT ALL
SUM OF ALL RESPONSES TO PHQ QUESTIONS 1-9: 18
1. LITTLE INTEREST OR PLEASURE IN DOING THINGS: MORE THAN HALF THE DAYS
CLINICAL INTERPRETATION OF PHQ9 SCORE: MODERATELY SEVERE DEPRESSION
SUM OF ALL RESPONSES TO PHQ9 QUESTIONS 1 AND 2: 4
6. FEELING BAD ABOUT YOURSELF - OR THAT YOU ARE A FAILURE OR HAVE LET YOURSELF OR YOUR FAMILY DOWN: MORE THAN HALF THE DAYS
10. IF YOU CHECKED OFF ANY PROBLEMS, HOW DIFFICULT HAVE THESE PROBLEMS MADE IT FOR YOU TO DO YOUR WORK, TAKE CARE OF THINGS AT HOME, OR GET ALONG WITH OTHER PEOPLE: VERY DIFFICULT
7. TROUBLE CONCENTRATING ON THINGS, SUCH AS READING THE NEWSPAPER OR WATCHING TELEVISION: MORE THAN HALF THE DAYS
5. POOR APPETITE OR OVEREATING: MORE THAN HALF THE DAYS
IS PATIENT ABLE TO COMPLETE PHQ2 OR PHQ9: YES
8. MOVING OR SPEAKING SO SLOWLY THAT OTHER PEOPLE COULD HAVE NOTICED. OR THE OPPOSITE, BEING SO FIGETY OR RESTLESS THAT YOU HAVE BEEN MOVING AROUND A LOT MORE THAN USUAL: MORE THAN HALF THE DAYS

## 2024-03-25 ASSESSMENT — COLUMBIA-SUICIDE SEVERITY RATING SCALE - C-SSRS
1. WITHIN THE PAST MONTH, HAVE YOU WISHED YOU WERE DEAD OR WISHED YOU COULD GO TO SLEEP AND NOT WAKE UP?: NO
IS THE PATIENT ABLE TO COMPLETE C-SSRS: YES
HOW LONG AGO DID YOU DO ANY OF THESE?: OVER A YEAR AGO
6. HAVE YOU EVER DONE ANYTHING, STARTED TO DO ANYTHING, OR PREPARED TO DO ANYTHING TO END YOUR LIFE?: YES
2. HAVE YOU ACTUALLY HAD ANY THOUGHTS OF KILLING YOURSELF?: NO

## 2024-03-25 ASSESSMENT — ACTIVITIES OF DAILY LIVING (ADL)
ADL_SHORT_OF_BREATH: YES
RECENT_DECLINE_ADL: NO
ADL_BEFORE_ADMISSION: INDEPENDENT
ADL_SCORE: 12

## 2024-03-25 ASSESSMENT — LIFESTYLE VARIABLES
ALCOHOL_USE_STATUS: NO OR LOW RISK WITH VALIDATED TOOL
AUDIT-C TOTAL SCORE: 0
HOW OFTEN DO YOU HAVE A DRINK CONTAINING ALCOHOL: NEVER
HOW OFTEN DO YOU HAVE 6 OR MORE DRINKS ON ONE OCCASION: NEVER
HOW MANY STANDARD DRINKS CONTAINING ALCOHOL DO YOU HAVE ON A TYPICAL DAY: 0,1 OR 2

## 2024-03-25 ASSESSMENT — PAIN SCALES - GENERAL
PAINLEVEL_OUTOF10: 0
PAINLEVEL_OUTOF10: 0

## 2024-03-26 ENCOUNTER — APPOINTMENT (OUTPATIENT)
Dept: NEUROLOGY | Age: 39
DRG: 756 | End: 2024-03-26
Attending: PSYCHIATRY & NEUROLOGY

## 2024-03-26 LAB
ALBUMIN SERPL-MCNC: 3.1 G/DL (ref 3.6–5.1)
ALP SERPL-CCNC: 43 UNITS/L (ref 45–117)
ALT SERPL-CCNC: 24 UNITS/L
AST SERPL-CCNC: 16 UNITS/L
BILIRUB CONJ SERPL-MCNC: 0.1 MG/DL (ref 0–0.2)
BILIRUB SERPL-MCNC: 0.6 MG/DL (ref 0.2–1)
LACTATE BLDV-SCNC: 0.7 MMOL/L (ref 0–2)
LIPASE SERPL-CCNC: 29 UNITS/L (ref 15–77)
PROT SERPL-MCNC: 6.5 G/DL (ref 6.4–8.2)

## 2024-03-26 PROCEDURE — 10002803 HB RX 637: Performed by: INTERNAL MEDICINE

## 2024-03-26 PROCEDURE — 96372 THER/PROPH/DIAG INJ SC/IM: CPT | Performed by: INTERNAL MEDICINE

## 2024-03-26 PROCEDURE — 36415 COLL VENOUS BLD VENIPUNCTURE: CPT | Performed by: INTERNAL MEDICINE

## 2024-03-26 PROCEDURE — 83690 ASSAY OF LIPASE: CPT | Performed by: INTERNAL MEDICINE

## 2024-03-26 PROCEDURE — 95720 EEG PHY/QHP EA INCR W/VEEG: CPT | Performed by: PSYCHIATRY & NEUROLOGY

## 2024-03-26 PROCEDURE — 10002800 HB RX 250 W HCPCS: Performed by: NURSE PRACTITIONER

## 2024-03-26 PROCEDURE — 99233 SBSQ HOSP IP/OBS HIGH 50: CPT | Performed by: INTERNAL MEDICINE

## 2024-03-26 PROCEDURE — 10002800 HB RX 250 W HCPCS: Performed by: INTERNAL MEDICINE

## 2024-03-26 PROCEDURE — 95716 VEEG EA 12-26HR CONT MNTR: CPT

## 2024-03-26 PROCEDURE — 83605 ASSAY OF LACTIC ACID: CPT | Performed by: INTERNAL MEDICINE

## 2024-03-26 PROCEDURE — 99233 SBSQ HOSP IP/OBS HIGH 50: CPT | Performed by: PSYCHIATRY & NEUROLOGY

## 2024-03-26 PROCEDURE — 10006031 HB ROOM CHARGE TELEMETRY

## 2024-03-26 PROCEDURE — 80076 HEPATIC FUNCTION PANEL: CPT | Performed by: INTERNAL MEDICINE

## 2024-03-26 PROCEDURE — 10004651 HB RX, NO CHARGE ITEM: Performed by: INTERNAL MEDICINE

## 2024-03-26 PROCEDURE — 13003377

## 2024-03-26 PROCEDURE — 10002803 HB RX 637: Performed by: PSYCHIATRY & NEUROLOGY

## 2024-03-26 RX ORDER — PROCHLORPERAZINE EDISYLATE 5 MG/ML
5 INJECTION INTRAMUSCULAR; INTRAVENOUS ONCE
Status: COMPLETED | OUTPATIENT
Start: 2024-03-26 | End: 2024-03-26

## 2024-03-26 RX ORDER — CALCIUM CARBONATE 500 MG/1
500 TABLET, CHEWABLE ORAL EVERY 4 HOURS PRN
Status: DISCONTINUED | OUTPATIENT
Start: 2024-03-26 | End: 2024-03-28 | Stop reason: HOSPADM

## 2024-03-26 RX ADMIN — ENOXAPARIN SODIUM 40 MG: 100 INJECTION SUBCUTANEOUS at 20:47

## 2024-03-26 RX ADMIN — LAMOTRIGINE 150 MG: 100 TABLET ORAL at 20:46

## 2024-03-26 RX ADMIN — Medication 1 PATCH: at 08:16

## 2024-03-26 RX ADMIN — ACETAMINOPHEN 650 MG: 325 TABLET ORAL at 08:57

## 2024-03-26 RX ADMIN — PROPRANOLOL HYDROCHLORIDE 60 MG: 60 CAPSULE, EXTENDED RELEASE ORAL at 08:58

## 2024-03-26 RX ADMIN — PRAZOSIN HYDROCHLORIDE 10 MG: 5 CAPSULE ORAL at 00:52

## 2024-03-26 RX ADMIN — SODIUM CHLORIDE, PRESERVATIVE FREE 2 ML: 5 INJECTION INTRAVENOUS at 20:47

## 2024-03-26 RX ADMIN — GABAPENTIN 400 MG: 300 CAPSULE ORAL at 08:51

## 2024-03-26 RX ADMIN — ONDANSETRON 4 MG: 4 TABLET, ORALLY DISINTEGRATING ORAL at 15:24

## 2024-03-26 RX ADMIN — PRAZOSIN HYDROCHLORIDE 5 MG: 5 CAPSULE ORAL at 08:58

## 2024-03-26 RX ADMIN — TOPIRAMATE 100 MG: 25 TABLET ORAL at 08:51

## 2024-03-26 RX ADMIN — SUMATRIPTAN SUCCINATE 100 MG: 50 TABLET ORAL at 11:36

## 2024-03-26 RX ADMIN — TOPIRAMATE 100 MG: 25 TABLET ORAL at 20:46

## 2024-03-26 RX ADMIN — HYDROXYZINE HYDROCHLORIDE 50 MG: 25 TABLET ORAL at 20:46

## 2024-03-26 RX ADMIN — ONDANSETRON 4 MG: 4 TABLET, ORALLY DISINTEGRATING ORAL at 08:15

## 2024-03-26 RX ADMIN — LAMOTRIGINE 150 MG: 100 TABLET ORAL at 08:51

## 2024-03-26 RX ADMIN — ACETAMINOPHEN 650 MG: 325 TABLET ORAL at 20:46

## 2024-03-26 RX ADMIN — PROCHLORPERAZINE EDISYLATE 5 MG: 5 INJECTION INTRAMUSCULAR; INTRAVENOUS at 18:38

## 2024-03-26 RX ADMIN — GABAPENTIN 400 MG: 300 CAPSULE ORAL at 20:45

## 2024-03-26 RX ADMIN — GABAPENTIN 400 MG: 300 CAPSULE ORAL at 14:12

## 2024-03-26 RX ADMIN — SODIUM CHLORIDE, PRESERVATIVE FREE 2 ML: 5 INJECTION INTRAVENOUS at 08:15

## 2024-03-26 RX ADMIN — HYDROXYZINE HYDROCHLORIDE 50 MG: 25 TABLET ORAL at 08:51

## 2024-03-26 RX ADMIN — TRAZODONE HYDROCHLORIDE 200 MG: 50 TABLET ORAL at 20:45

## 2024-03-26 RX ADMIN — GABAPENTIN 400 MG: 300 CAPSULE ORAL at 18:37

## 2024-03-26 RX ADMIN — PRAZOSIN HYDROCHLORIDE 10 MG: 5 CAPSULE ORAL at 20:47

## 2024-03-26 RX ADMIN — VORTIOXETINE 20 MG: 10 TABLET, FILM COATED ORAL at 08:59

## 2024-03-26 ASSESSMENT — ENCOUNTER SYMPTOMS
CHILLS: 0
FEVER: 0
HEARTBURN: 1
DOUBLE VISION: 0
BLURRED VISION: 0

## 2024-03-26 ASSESSMENT — PAIN SCALES - GENERAL
PAINLEVEL_OUTOF10: 2
PAINLEVEL_OUTOF10: 3
PAINLEVEL_OUTOF10: 7
PAINLEVEL_OUTOF10: 4
PAINLEVEL_OUTOF10: 7

## 2024-03-27 ENCOUNTER — APPOINTMENT (OUTPATIENT)
Dept: NEUROLOGY | Age: 39
DRG: 756 | End: 2024-03-27
Attending: PSYCHIATRY & NEUROLOGY

## 2024-03-27 PROCEDURE — 10002803 HB RX 637: Performed by: INTERNAL MEDICINE

## 2024-03-27 PROCEDURE — 96372 THER/PROPH/DIAG INJ SC/IM: CPT | Performed by: INTERNAL MEDICINE

## 2024-03-27 PROCEDURE — 10006031 HB ROOM CHARGE TELEMETRY

## 2024-03-27 PROCEDURE — 99233 SBSQ HOSP IP/OBS HIGH 50: CPT | Performed by: PSYCHIATRY & NEUROLOGY

## 2024-03-27 PROCEDURE — 10002800 HB RX 250 W HCPCS: Performed by: INTERNAL MEDICINE

## 2024-03-27 PROCEDURE — 10002803 HB RX 637: Performed by: PSYCHIATRY & NEUROLOGY

## 2024-03-27 PROCEDURE — 99233 SBSQ HOSP IP/OBS HIGH 50: CPT | Performed by: INTERNAL MEDICINE

## 2024-03-27 PROCEDURE — 95720 EEG PHY/QHP EA INCR W/VEEG: CPT | Performed by: PSYCHIATRY & NEUROLOGY

## 2024-03-27 PROCEDURE — 10004651 HB RX, NO CHARGE ITEM: Performed by: INTERNAL MEDICINE

## 2024-03-27 PROCEDURE — 95716 VEEG EA 12-26HR CONT MNTR: CPT

## 2024-03-27 PROCEDURE — 10002800 HB RX 250 W HCPCS: Performed by: NURSE PRACTITIONER

## 2024-03-27 RX ORDER — TOPIRAMATE 25 MG/1
50 TABLET ORAL EVERY 12 HOURS SCHEDULED
Status: DISCONTINUED | OUTPATIENT
Start: 2024-03-27 | End: 2024-03-28 | Stop reason: HOSPADM

## 2024-03-27 RX ADMIN — PRAZOSIN HYDROCHLORIDE 10 MG: 5 CAPSULE ORAL at 21:04

## 2024-03-27 RX ADMIN — ONDANSETRON 4 MG: 4 TABLET, ORALLY DISINTEGRATING ORAL at 23:31

## 2024-03-27 RX ADMIN — SUMATRIPTAN SUCCINATE 100 MG: 50 TABLET ORAL at 12:47

## 2024-03-27 RX ADMIN — GABAPENTIN 400 MG: 300 CAPSULE ORAL at 08:07

## 2024-03-27 RX ADMIN — ENOXAPARIN SODIUM 40 MG: 100 INJECTION SUBCUTANEOUS at 21:08

## 2024-03-27 RX ADMIN — HYDROXYZINE HYDROCHLORIDE 50 MG: 25 TABLET ORAL at 21:04

## 2024-03-27 RX ADMIN — LAMOTRIGINE 150 MG: 100 TABLET ORAL at 21:04

## 2024-03-27 RX ADMIN — LAMOTRIGINE 150 MG: 100 TABLET ORAL at 08:08

## 2024-03-27 RX ADMIN — GABAPENTIN 400 MG: 300 CAPSULE ORAL at 12:47

## 2024-03-27 RX ADMIN — SODIUM CHLORIDE, PRESERVATIVE FREE 2 ML: 5 INJECTION INTRAVENOUS at 21:07

## 2024-03-27 RX ADMIN — VORTIOXETINE 20 MG: 10 TABLET, FILM COATED ORAL at 08:12

## 2024-03-27 RX ADMIN — HYDROXYZINE HYDROCHLORIDE 50 MG: 25 TABLET ORAL at 08:08

## 2024-03-27 RX ADMIN — PROPRANOLOL HYDROCHLORIDE 60 MG: 60 CAPSULE, EXTENDED RELEASE ORAL at 08:12

## 2024-03-27 RX ADMIN — GABAPENTIN 400 MG: 300 CAPSULE ORAL at 21:13

## 2024-03-27 RX ADMIN — ONDANSETRON 4 MG: 4 TABLET, ORALLY DISINTEGRATING ORAL at 11:44

## 2024-03-27 RX ADMIN — ACETAMINOPHEN 650 MG: 325 TABLET ORAL at 21:04

## 2024-03-27 RX ADMIN — PRAZOSIN HYDROCHLORIDE 5 MG: 5 CAPSULE ORAL at 08:11

## 2024-03-27 RX ADMIN — Medication 1 PATCH: at 08:07

## 2024-03-27 RX ADMIN — SODIUM CHLORIDE, PRESERVATIVE FREE 2 ML: 5 INJECTION INTRAVENOUS at 08:05

## 2024-03-27 RX ADMIN — ONDANSETRON 4 MG: 2 INJECTION INTRAMUSCULAR; INTRAVENOUS at 17:00

## 2024-03-27 RX ADMIN — TOPIRAMATE 100 MG: 25 TABLET ORAL at 08:08

## 2024-03-27 RX ADMIN — GABAPENTIN 400 MG: 300 CAPSULE ORAL at 17:48

## 2024-03-27 RX ADMIN — TOPIRAMATE 50 MG: 25 TABLET ORAL at 21:04

## 2024-03-27 SDOH — ECONOMIC STABILITY: HOUSING INSECURITY: WHAT IS YOUR LIVING SITUATION TODAY?: I HAVE A STEADY PLACE TO LIVE

## 2024-03-27 SDOH — ECONOMIC STABILITY: HOUSING INSECURITY: DO YOU HAVE PROBLEMS WITH ANY OF THE FOLLOWING?: NONE OF THE ABOVE

## 2024-03-27 ASSESSMENT — PAIN SCALES - GENERAL
PAINLEVEL_OUTOF10: 2
PAINLEVEL_OUTOF10: 6
PAINLEVEL_OUTOF10: 6
PAINLEVEL_OUTOF10: 2

## 2024-03-27 ASSESSMENT — ENCOUNTER SYMPTOMS
DOUBLE VISION: 0
FEVER: 0
BLURRED VISION: 0
CHILLS: 0

## 2024-03-27 ASSESSMENT — MOVEMENT AND STRENGTH ASSESSMENTS
RLE_ASSESSMENT: NORMAL/COMPLETE ROM AGAINST GRAVITY WITH FULL RESISTANCE
RUE_ASSESSMENT: NORMAL/COMPLETE ROM AGAINST GRAVITY WITH FULL RESISTANCE
LLE_ASSESSMENT: NORMAL/COMPLETE ROM AGAINST GRAVITY WITH FULL RESISTANCE
RLE_ASSESSMENT: NORMAL/COMPLETE ROM AGAINST GRAVITY WITH FULL RESISTANCE
LUE_ASSESSMENT: NORMAL/COMPLETE ROM AGAINST GRAVITY WITH FULL RESISTANCE
LUE_ASSESSMENT: NORMAL/COMPLETE ROM AGAINST GRAVITY WITH FULL RESISTANCE
LLE_ASSESSMENT: NORMAL/COMPLETE ROM AGAINST GRAVITY WITH FULL RESISTANCE
RUE_ASSESSMENT: NORMAL/COMPLETE ROM AGAINST GRAVITY WITH FULL RESISTANCE

## 2024-03-27 ASSESSMENT — COGNITIVE AND FUNCTIONAL STATUS - GENERAL
BECAUSE OF A PHYSICAL, MENTAL, OR EMOTIONAL CONDITION, DO YOU HAVE DIFFICULTY DOING ERRANDS ALONE: NO
BECAUSE OF A PHYSICAL, MENTAL, OR EMOTIONAL CONDITION, DO YOU HAVE SERIOUS DIFFICULTY CONCENTRATING, REMEMBERING OR MAKING DECISIONS: NO
DO YOU HAVE SERIOUS DIFFICULTY WALKING OR CLIMBING STAIRS: NO

## 2024-03-28 ENCOUNTER — APPOINTMENT (OUTPATIENT)
Dept: NEUROLOGY | Age: 39
DRG: 756 | End: 2024-03-28
Attending: PSYCHIATRY & NEUROLOGY

## 2024-03-28 VITALS
BODY MASS INDEX: 35.15 KG/M2 | WEIGHT: 210.98 LBS | HEIGHT: 65 IN | HEART RATE: 48 BPM | SYSTOLIC BLOOD PRESSURE: 100 MMHG | RESPIRATION RATE: 16 BRPM | DIASTOLIC BLOOD PRESSURE: 63 MMHG | TEMPERATURE: 97.7 F | OXYGEN SATURATION: 95 %

## 2024-03-28 PROCEDURE — 99233 SBSQ HOSP IP/OBS HIGH 50: CPT | Performed by: PSYCHIATRY & NEUROLOGY

## 2024-03-28 PROCEDURE — 95716 VEEG EA 12-26HR CONT MNTR: CPT

## 2024-03-28 PROCEDURE — 10002800 HB RX 250 W HCPCS: Performed by: NURSE PRACTITIONER

## 2024-03-28 PROCEDURE — 10002803 HB RX 637: Performed by: INTERNAL MEDICINE

## 2024-03-28 PROCEDURE — 99239 HOSP IP/OBS DSCHRG MGMT >30: CPT | Performed by: INTERNAL MEDICINE

## 2024-03-28 PROCEDURE — 10004651 HB RX, NO CHARGE ITEM: Performed by: INTERNAL MEDICINE

## 2024-03-28 PROCEDURE — 10002803 HB RX 637: Performed by: PSYCHIATRY & NEUROLOGY

## 2024-03-28 PROCEDURE — 95713 VEEG 2-12 HR CONT MNTR: CPT

## 2024-03-28 PROCEDURE — 95718 EEG PHYS/QHP 2-12 HR W/VEEG: CPT | Performed by: PSYCHIATRY & NEUROLOGY

## 2024-03-28 RX ORDER — TRAZODONE HYDROCHLORIDE 100 MG/1
200 TABLET ORAL NIGHTLY
Refills: 0 | Status: SHIPPED | INPATIENT
Start: 2024-03-28 | End: 2024-04-02 | Stop reason: SDUPTHER

## 2024-03-28 RX ORDER — TOPIRAMATE 50 MG/1
50 TABLET, FILM COATED ORAL EVERY 12 HOURS SCHEDULED
Qty: 60 TABLET | Refills: 0 | Status: SHIPPED | OUTPATIENT
Start: 2024-03-28

## 2024-03-28 RX ORDER — HYDROXYZINE 50 MG/1
50 TABLET, FILM COATED ORAL 2 TIMES DAILY PRN
Refills: 0 | Status: SHIPPED | INPATIENT
Start: 2024-03-28 | End: 2024-04-02 | Stop reason: SDUPTHER

## 2024-03-28 RX ADMIN — SODIUM CHLORIDE, PRESERVATIVE FREE 2 ML: 5 INJECTION INTRAVENOUS at 08:43

## 2024-03-28 RX ADMIN — TOPIRAMATE 50 MG: 25 TABLET ORAL at 08:39

## 2024-03-28 RX ADMIN — HYDROXYZINE HYDROCHLORIDE 50 MG: 25 TABLET ORAL at 08:38

## 2024-03-28 RX ADMIN — PRAZOSIN HYDROCHLORIDE 5 MG: 5 CAPSULE ORAL at 08:40

## 2024-03-28 RX ADMIN — PROPRANOLOL HYDROCHLORIDE 60 MG: 60 CAPSULE, EXTENDED RELEASE ORAL at 08:38

## 2024-03-28 RX ADMIN — VORTIOXETINE 20 MG: 10 TABLET, FILM COATED ORAL at 08:39

## 2024-03-28 RX ADMIN — SUMATRIPTAN SUCCINATE 100 MG: 50 TABLET ORAL at 14:15

## 2024-03-28 RX ADMIN — Medication 1 PATCH: at 08:42

## 2024-03-28 RX ADMIN — LAMOTRIGINE 150 MG: 100 TABLET ORAL at 08:39

## 2024-03-28 RX ADMIN — GABAPENTIN 400 MG: 300 CAPSULE ORAL at 14:15

## 2024-03-28 RX ADMIN — ACETAMINOPHEN 650 MG: 325 TABLET ORAL at 08:49

## 2024-03-28 RX ADMIN — ONDANSETRON 4 MG: 2 INJECTION INTRAMUSCULAR; INTRAVENOUS at 06:36

## 2024-03-28 RX ADMIN — GABAPENTIN 400 MG: 300 CAPSULE ORAL at 08:39

## 2024-03-28 RX ADMIN — ACETAMINOPHEN 650 MG: 325 TABLET ORAL at 01:27

## 2024-03-28 ASSESSMENT — PAIN SCALES - GENERAL
PAINLEVEL_OUTOF10: 2
PAINLEVEL_OUTOF10: 4
PAINLEVEL_OUTOF10: 8
PAINLEVEL_OUTOF10: 4
PAINLEVEL_OUTOF10: 6

## 2024-03-29 ENCOUNTER — TELEPHONE (OUTPATIENT)
Dept: NEUROLOGY | Age: 39
End: 2024-03-29

## 2024-03-30 ENCOUNTER — TELEPHONE (OUTPATIENT)
Dept: CARE COORDINATION | Age: 39
End: 2024-03-30

## 2024-04-01 ENCOUNTER — TELEPHONE (OUTPATIENT)
Dept: NEUROLOGY | Age: 39
End: 2024-04-01

## 2024-04-02 ENCOUNTER — BEHAVIORAL HEALTH (OUTPATIENT)
Dept: BEHAVIORAL HEALTH | Age: 39
End: 2024-04-02

## 2024-04-02 ENCOUNTER — E-ADVICE (OUTPATIENT)
Dept: NEUROLOGY | Age: 39
End: 2024-04-02

## 2024-04-02 ENCOUNTER — TELEPHONE (OUTPATIENT)
Dept: BEHAVIORAL HEALTH | Age: 39
End: 2024-04-02

## 2024-04-02 DIAGNOSIS — F43.10 PTSD (POST-TRAUMATIC STRESS DISORDER): ICD-10-CM

## 2024-04-02 DIAGNOSIS — F31.32 BIPOLAR AFFECTIVE DISORDER, CURRENTLY DEPRESSED, MODERATE (CMD): Primary | ICD-10-CM

## 2024-04-02 DIAGNOSIS — F51.01 PRIMARY INSOMNIA: ICD-10-CM

## 2024-04-02 DIAGNOSIS — F41.9 ANXIETY: ICD-10-CM

## 2024-04-02 PROCEDURE — 99214 OFFICE O/P EST MOD 30 MIN: CPT | Performed by: PSYCHIATRY & NEUROLOGY

## 2024-04-02 RX ORDER — PRAZOSIN HYDROCHLORIDE 5 MG/1
CAPSULE ORAL
Qty: 90 CAPSULE | Refills: 0 | Status: SHIPPED | OUTPATIENT
Start: 2024-04-02

## 2024-04-02 RX ORDER — TRAZODONE HYDROCHLORIDE 100 MG/1
200 TABLET ORAL NIGHTLY
Qty: 60 TABLET | Refills: 0 | Status: SHIPPED | OUTPATIENT
Start: 2024-04-02

## 2024-04-02 RX ORDER — HYDROXYZINE 50 MG/1
50 TABLET, FILM COATED ORAL 2 TIMES DAILY PRN
Qty: 60 TABLET | Refills: 0 | Status: SHIPPED | OUTPATIENT
Start: 2024-04-02

## 2024-04-02 RX ORDER — LAMOTRIGINE 150 MG/1
150 TABLET ORAL 2 TIMES DAILY
Qty: 60 TABLET | Refills: 0 | Status: SHIPPED | OUTPATIENT
Start: 2024-04-02

## 2024-04-06 ENCOUNTER — TELEPHONE (OUTPATIENT)
Dept: CARE COORDINATION | Age: 39
End: 2024-04-06

## 2024-04-07 ENCOUNTER — TELEPHONE (OUTPATIENT)
Dept: CARE COORDINATION | Age: 39
End: 2024-04-07

## 2024-04-13 ENCOUNTER — TELEPHONE (OUTPATIENT)
Dept: CARE COORDINATION | Age: 39
End: 2024-04-13

## 2024-04-14 ENCOUNTER — TELEPHONE (OUTPATIENT)
Dept: CARE COORDINATION | Age: 39
End: 2024-04-14

## 2024-04-20 ENCOUNTER — TELEPHONE (OUTPATIENT)
Dept: CARE COORDINATION | Age: 39
End: 2024-04-20

## 2024-04-25 ENCOUNTER — E-ADVICE (OUTPATIENT)
Dept: NEUROLOGY | Age: 39
End: 2024-04-25

## 2024-04-25 ENCOUNTER — TELEPHONE (OUTPATIENT)
Dept: NEUROLOGY | Age: 39
End: 2024-04-25

## 2024-04-27 ENCOUNTER — CASE MANAGEMENT (OUTPATIENT)
Dept: CARE COORDINATION | Age: 39
End: 2024-04-27

## 2024-04-27 ENCOUNTER — TELEPHONE (OUTPATIENT)
Dept: CARE COORDINATION | Age: 39
End: 2024-04-27

## 2024-04-27 DIAGNOSIS — G43.101 MIGRAINE WITH AURA AND WITH STATUS MIGRAINOSUS, NOT INTRACTABLE: ICD-10-CM

## 2024-04-29 RX ORDER — SUMATRIPTAN 100 MG/1
TABLET, FILM COATED ORAL
Qty: 9 TABLET | Refills: 0 | Status: SHIPPED | OUTPATIENT
Start: 2024-04-29 | End: 2024-05-02 | Stop reason: SDUPTHER

## 2024-04-30 ENCOUNTER — CASE MANAGEMENT (OUTPATIENT)
Dept: CARE COORDINATION | Age: 39
End: 2024-04-30

## 2024-05-02 ENCOUNTER — APPOINTMENT (OUTPATIENT)
Dept: BEHAVIORAL HEALTH | Age: 39
End: 2024-05-02

## 2024-05-02 ENCOUNTER — APPOINTMENT (OUTPATIENT)
Dept: NEUROLOGY | Age: 39
End: 2024-05-02

## 2024-05-02 DIAGNOSIS — F31.32 BIPOLAR AFFECTIVE DISORDER, CURRENTLY DEPRESSED, MODERATE  (CMD): Primary | ICD-10-CM

## 2024-05-02 DIAGNOSIS — F41.9 ANXIETY: ICD-10-CM

## 2024-05-02 DIAGNOSIS — F51.01 PRIMARY INSOMNIA: ICD-10-CM

## 2024-05-02 DIAGNOSIS — F43.10 PTSD (POST-TRAUMATIC STRESS DISORDER): ICD-10-CM

## 2024-05-02 DIAGNOSIS — F44.5 PSYCHOGENIC NONEPILEPTIC SEIZURE: ICD-10-CM

## 2024-05-02 DIAGNOSIS — G47.8 SLEEP PARALYSIS: Primary | ICD-10-CM

## 2024-05-02 DIAGNOSIS — G43.101 MIGRAINE WITH AURA AND WITH STATUS MIGRAINOSUS, NOT INTRACTABLE: ICD-10-CM

## 2024-05-02 PROCEDURE — G2211 COMPLEX E/M VISIT ADD ON: HCPCS | Performed by: PSYCHIATRY & NEUROLOGY

## 2024-05-02 PROCEDURE — 99214 OFFICE O/P EST MOD 30 MIN: CPT | Performed by: PSYCHIATRY & NEUROLOGY

## 2024-05-02 PROCEDURE — 99215 OFFICE O/P EST HI 40 MIN: CPT | Performed by: PSYCHIATRY & NEUROLOGY

## 2024-05-02 RX ORDER — PRAZOSIN HYDROCHLORIDE 5 MG/1
CAPSULE ORAL
Qty: 90 CAPSULE | Refills: 0 | Status: SHIPPED | OUTPATIENT
Start: 2024-05-02

## 2024-05-02 RX ORDER — TOPIRAMATE 100 MG/1
100 TABLET, FILM COATED ORAL EVERY 12 HOURS SCHEDULED
Qty: 180 TABLET | Refills: 3 | Status: SHIPPED | OUTPATIENT
Start: 2024-05-02

## 2024-05-02 RX ORDER — TRAZODONE HYDROCHLORIDE 100 MG/1
200 TABLET ORAL NIGHTLY
Qty: 60 TABLET | Refills: 0 | Status: SHIPPED | OUTPATIENT
Start: 2024-05-02

## 2024-05-02 RX ORDER — PREDNISONE 10 MG/1
TABLET ORAL
COMMUNITY
Start: 2024-02-22 | End: 2024-05-02

## 2024-05-02 RX ORDER — LURASIDONE HYDROCHLORIDE 40 MG/1
40 TABLET, FILM COATED ORAL
Qty: 30 TABLET | Refills: 0 | Status: SHIPPED | OUTPATIENT
Start: 2024-05-02

## 2024-05-02 RX ORDER — LAMOTRIGINE 150 MG/1
150 TABLET ORAL 2 TIMES DAILY
Qty: 60 TABLET | Refills: 0 | Status: SHIPPED | OUTPATIENT
Start: 2024-05-02

## 2024-05-02 RX ORDER — OMEPRAZOLE 20 MG/1
20 CAPSULE, DELAYED RELEASE ORAL
COMMUNITY
Start: 2024-04-01

## 2024-05-02 RX ORDER — SUMATRIPTAN 100 MG/1
100 TABLET, FILM COATED ORAL PRN
Qty: 9 TABLET | Refills: 11 | Status: SHIPPED | OUTPATIENT
Start: 2024-05-02

## 2024-05-02 RX ORDER — HYDROXYZINE 50 MG/1
50 TABLET, FILM COATED ORAL 2 TIMES DAILY PRN
Qty: 60 TABLET | Refills: 0 | Status: SHIPPED | OUTPATIENT
Start: 2024-05-02

## 2024-05-07 ENCOUNTER — TELEPHONE (OUTPATIENT)
Dept: BEHAVIORAL HEALTH | Age: 39
End: 2024-05-07

## 2024-05-14 ENCOUNTER — E-ADVICE (OUTPATIENT)
Dept: NEUROLOGY | Age: 39
End: 2024-05-14

## 2024-05-30 ENCOUNTER — E-ADVICE (OUTPATIENT)
Dept: OTHER | Age: 39
End: 2024-05-30

## 2024-05-30 ENCOUNTER — APPOINTMENT (OUTPATIENT)
Dept: BEHAVIORAL HEALTH | Age: 39
End: 2024-05-30

## 2024-05-31 ENCOUNTER — E-ADVICE (OUTPATIENT)
Dept: BEHAVIORAL HEALTH | Age: 39
End: 2024-05-31

## 2024-05-31 ENCOUNTER — E-ADVICE (OUTPATIENT)
Dept: NEUROLOGY | Age: 39
End: 2024-05-31

## 2024-05-31 RX ORDER — AMITRIPTYLINE HYDROCHLORIDE 10 MG/1
10 TABLET, FILM COATED ORAL NIGHTLY
COMMUNITY
End: 2024-05-31 | Stop reason: SDUPTHER

## 2024-05-31 RX ORDER — AMITRIPTYLINE HYDROCHLORIDE 10 MG/1
10 TABLET, FILM COATED ORAL NIGHTLY
Qty: 30 TABLET | Refills: 0 | Status: SHIPPED | OUTPATIENT
Start: 2024-05-31

## 2024-06-03 ENCOUNTER — TELEPHONE (OUTPATIENT)
Dept: NEUROLOGY | Age: 39
End: 2024-06-03

## 2024-06-04 DIAGNOSIS — F51.01 PRIMARY INSOMNIA: ICD-10-CM

## 2024-06-04 DIAGNOSIS — F43.10 PTSD (POST-TRAUMATIC STRESS DISORDER): ICD-10-CM

## 2024-06-04 DIAGNOSIS — F31.32 BIPOLAR AFFECTIVE DISORDER, CURRENTLY DEPRESSED, MODERATE  (CMD): ICD-10-CM

## 2024-06-04 DIAGNOSIS — F41.9 ANXIETY: ICD-10-CM

## 2024-06-04 RX ORDER — PRAZOSIN HYDROCHLORIDE 5 MG/1
CAPSULE ORAL
Qty: 21 CAPSULE | Refills: 0 | Status: SHIPPED | OUTPATIENT
Start: 2024-06-04

## 2024-06-04 RX ORDER — LAMOTRIGINE 150 MG/1
150 TABLET ORAL 2 TIMES DAILY
Qty: 14 TABLET | Refills: 0 | Status: SHIPPED | OUTPATIENT
Start: 2024-06-04

## 2024-06-04 RX ORDER — LURASIDONE HYDROCHLORIDE 40 MG/1
40 TABLET, FILM COATED ORAL
Qty: 7 TABLET | Refills: 0 | Status: SHIPPED | OUTPATIENT
Start: 2024-06-04

## 2024-06-04 RX ORDER — HYDROXYZINE 50 MG/1
50 TABLET, FILM COATED ORAL 2 TIMES DAILY PRN
Qty: 14 TABLET | Refills: 0 | Status: SHIPPED | OUTPATIENT
Start: 2024-06-04

## 2024-06-04 RX ORDER — TRAZODONE HYDROCHLORIDE 100 MG/1
200 TABLET ORAL NIGHTLY
Qty: 14 TABLET | Refills: 0 | Status: SHIPPED | OUTPATIENT
Start: 2024-06-04

## 2024-06-06 ENCOUNTER — OFFICE VISIT (OUTPATIENT)
Dept: NEUROLOGY | Age: 39
End: 2024-06-06

## 2024-06-06 VITALS
DIASTOLIC BLOOD PRESSURE: 66 MMHG | WEIGHT: 191 LBS | HEIGHT: 65 IN | SYSTOLIC BLOOD PRESSURE: 93 MMHG | HEART RATE: 81 BPM | BODY MASS INDEX: 31.82 KG/M2 | OXYGEN SATURATION: 98 %

## 2024-06-06 DIAGNOSIS — R11.14 BILIOUS VOMITING WITH NAUSEA: Primary | ICD-10-CM

## 2024-06-06 DIAGNOSIS — F44.5 PSYCHOGENIC NONEPILEPTIC SEIZURE: ICD-10-CM

## 2024-06-06 DIAGNOSIS — G43.101 MIGRAINE WITH AURA AND WITH STATUS MIGRAINOSUS, NOT INTRACTABLE: ICD-10-CM

## 2024-06-06 PROCEDURE — 99215 OFFICE O/P EST HI 40 MIN: CPT | Performed by: PSYCHIATRY & NEUROLOGY

## 2024-06-06 PROCEDURE — G2211 COMPLEX E/M VISIT ADD ON: HCPCS | Performed by: PSYCHIATRY & NEUROLOGY

## 2024-06-06 ASSESSMENT — PAIN SCALES - GENERAL: PAINLEVEL: 7

## 2024-06-10 ENCOUNTER — APPOINTMENT (OUTPATIENT)
Dept: BEHAVIORAL HEALTH | Age: 39
End: 2024-06-10

## 2024-06-10 ENCOUNTER — TELEPHONE (OUTPATIENT)
Dept: BEHAVIORAL HEALTH | Age: 39
End: 2024-06-10

## 2024-06-10 DIAGNOSIS — F31.32 BIPOLAR AFFECTIVE DISORDER, CURRENTLY DEPRESSED, MODERATE  (CMD): Primary | ICD-10-CM

## 2024-06-10 DIAGNOSIS — F41.9 ANXIETY: ICD-10-CM

## 2024-06-10 DIAGNOSIS — F43.10 PTSD (POST-TRAUMATIC STRESS DISORDER): ICD-10-CM

## 2024-06-10 DIAGNOSIS — F51.01 PRIMARY INSOMNIA: ICD-10-CM

## 2024-06-10 PROCEDURE — 99214 OFFICE O/P EST MOD 30 MIN: CPT | Performed by: PSYCHIATRY & NEUROLOGY

## 2024-06-10 RX ORDER — PRAZOSIN HYDROCHLORIDE 5 MG/1
CAPSULE ORAL
Qty: 90 CAPSULE | Refills: 0 | Status: SHIPPED | OUTPATIENT
Start: 2024-06-10

## 2024-06-10 RX ORDER — HYDROXYZINE 50 MG/1
50 TABLET, FILM COATED ORAL 2 TIMES DAILY PRN
Qty: 60 TABLET | Refills: 0 | Status: SHIPPED | OUTPATIENT
Start: 2024-06-10

## 2024-06-10 RX ORDER — LAMOTRIGINE 150 MG/1
150 TABLET ORAL 2 TIMES DAILY
Qty: 60 TABLET | Refills: 0 | Status: SHIPPED | OUTPATIENT
Start: 2024-06-10

## 2024-06-10 RX ORDER — AMITRIPTYLINE HYDROCHLORIDE 25 MG/1
25 TABLET, FILM COATED ORAL NIGHTLY
Qty: 30 TABLET | Refills: 0 | Status: SHIPPED | OUTPATIENT
Start: 2024-06-10

## 2024-06-10 RX ORDER — LURASIDONE HYDROCHLORIDE 60 MG/1
60 TABLET, FILM COATED ORAL
Qty: 30 TABLET | Refills: 0 | Status: SHIPPED | OUTPATIENT
Start: 2024-06-10

## 2024-06-19 ENCOUNTER — APPOINTMENT (OUTPATIENT)
Dept: CT IMAGING | Age: 39
End: 2024-06-19
Attending: PSYCHIATRY & NEUROLOGY

## 2024-06-19 ENCOUNTER — E-ADVICE (OUTPATIENT)
Dept: BEHAVIORAL HEALTH | Age: 39
End: 2024-06-19

## 2024-06-19 RX ORDER — TRAZODONE HYDROCHLORIDE 100 MG/1
200 TABLET ORAL NIGHTLY
Qty: 60 TABLET | Refills: 0 | Status: SHIPPED | OUTPATIENT
Start: 2024-06-19

## 2024-06-19 RX ORDER — TRAZODONE HYDROCHLORIDE 100 MG/1
200 TABLET ORAL NIGHTLY
COMMUNITY
End: 2024-06-19 | Stop reason: SDUPTHER

## 2024-06-24 ENCOUNTER — E-ADVICE (OUTPATIENT)
Dept: NEUROLOGY | Age: 39
End: 2024-06-24

## 2024-07-11 ENCOUNTER — OFFICE VISIT (OUTPATIENT)
Dept: NEUROLOGY | Age: 39
End: 2024-07-11

## 2024-07-11 DIAGNOSIS — F43.10 PTSD (POST-TRAUMATIC STRESS DISORDER): ICD-10-CM

## 2024-07-11 DIAGNOSIS — F44.5 PSYCHOGENIC NONEPILEPTIC SEIZURE: Primary | ICD-10-CM

## 2024-07-11 DIAGNOSIS — G44.059 SUNCT (SHORT UNILATERAL NEURALGIFORM HEADACHE, CONJUNCTIVAL INJ/TEAR): ICD-10-CM

## 2024-07-11 DIAGNOSIS — G43.101 MIGRAINE WITH AURA AND WITH STATUS MIGRAINOSUS, NOT INTRACTABLE: Chronic | ICD-10-CM

## 2024-07-11 DIAGNOSIS — F41.9 ANXIETY: ICD-10-CM

## 2024-07-11 DIAGNOSIS — F51.01 PRIMARY INSOMNIA: ICD-10-CM

## 2024-07-11 DIAGNOSIS — F31.32 BIPOLAR AFFECTIVE DISORDER, CURRENTLY DEPRESSED, MODERATE  (CMD): ICD-10-CM

## 2024-07-11 PROCEDURE — G2211 COMPLEX E/M VISIT ADD ON: HCPCS | Performed by: PSYCHIATRY & NEUROLOGY

## 2024-07-11 PROCEDURE — 99215 OFFICE O/P EST HI 40 MIN: CPT | Performed by: PSYCHIATRY & NEUROLOGY

## 2024-07-11 RX ORDER — LAMOTRIGINE 150 MG/1
150 TABLET ORAL 2 TIMES DAILY
Qty: 28 TABLET | Refills: 0 | Status: SHIPPED | OUTPATIENT
Start: 2024-07-11

## 2024-07-11 RX ORDER — PRAZOSIN HYDROCHLORIDE 5 MG/1
CAPSULE ORAL
Qty: 42 CAPSULE | Refills: 0 | Status: SHIPPED | OUTPATIENT
Start: 2024-07-11

## 2024-07-11 RX ORDER — LURASIDONE HYDROCHLORIDE 60 MG/1
TABLET, FILM COATED ORAL
Qty: 14 TABLET | Refills: 0 | Status: SHIPPED | OUTPATIENT
Start: 2024-07-11

## 2024-07-11 RX ORDER — GABAPENTIN 400 MG/1
400 CAPSULE ORAL 4 TIMES DAILY
Qty: 360 CAPSULE | Refills: 3 | Status: SHIPPED | OUTPATIENT
Start: 2024-07-11 | End: 2025-07-11

## 2024-07-11 RX ORDER — HYDROXYZINE 50 MG/1
50 TABLET, FILM COATED ORAL 2 TIMES DAILY PRN
Qty: 28 TABLET | Refills: 0 | Status: SHIPPED | OUTPATIENT
Start: 2024-07-11

## 2024-07-11 RX ORDER — PROPRANOLOL HCL 60 MG
60 CAPSULE, EXTENDED RELEASE 24HR ORAL DAILY
Qty: 90 CAPSULE | Refills: 3 | Status: SHIPPED | OUTPATIENT
Start: 2024-07-11

## 2024-07-11 RX ORDER — AMITRIPTYLINE HYDROCHLORIDE 25 MG/1
25 TABLET, FILM COATED ORAL NIGHTLY
Qty: 14 TABLET | Refills: 0 | Status: SHIPPED | OUTPATIENT
Start: 2024-07-11

## 2024-07-11 RX ORDER — FREMANEZUMAB-VFRM 225 MG/1.5ML
225 INJECTION SUBCUTANEOUS
Qty: 1.5 ML | Refills: 11 | Status: SHIPPED | OUTPATIENT
Start: 2024-08-09

## 2024-07-11 RX ORDER — VORTIOXETINE 20 MG/1
20 TABLET, FILM COATED ORAL DAILY
Qty: 14 TABLET | Refills: 0 | Status: SHIPPED | OUTPATIENT
Start: 2024-07-11

## 2024-07-23 ENCOUNTER — APPOINTMENT (OUTPATIENT)
Dept: BEHAVIORAL HEALTH | Age: 39
End: 2024-07-23

## 2024-07-23 DIAGNOSIS — F41.9 ANXIETY: ICD-10-CM

## 2024-07-23 DIAGNOSIS — F31.32 BIPOLAR AFFECTIVE DISORDER, CURRENTLY DEPRESSED, MODERATE  (CMD): Primary | ICD-10-CM

## 2024-07-23 DIAGNOSIS — F43.10 PTSD (POST-TRAUMATIC STRESS DISORDER): ICD-10-CM

## 2024-07-23 DIAGNOSIS — F51.01 PRIMARY INSOMNIA: ICD-10-CM

## 2024-07-23 PROCEDURE — 99214 OFFICE O/P EST MOD 30 MIN: CPT | Performed by: PSYCHIATRY & NEUROLOGY

## 2024-07-23 RX ORDER — AMITRIPTYLINE HYDROCHLORIDE 25 MG/1
25 TABLET, FILM COATED ORAL NIGHTLY
Qty: 30 TABLET | Refills: 0 | Status: SHIPPED | OUTPATIENT
Start: 2024-07-23

## 2024-07-23 RX ORDER — LAMOTRIGINE 150 MG/1
150 TABLET ORAL 2 TIMES DAILY
Qty: 60 TABLET | Refills: 0 | Status: SHIPPED | OUTPATIENT
Start: 2024-07-23

## 2024-07-23 RX ORDER — LURASIDONE HYDROCHLORIDE 60 MG/1
60 TABLET, FILM COATED ORAL DAILY
Qty: 30 TABLET | Refills: 0 | Status: SHIPPED | OUTPATIENT
Start: 2024-07-23

## 2024-07-23 RX ORDER — PRAZOSIN HYDROCHLORIDE 5 MG/1
CAPSULE ORAL
Qty: 90 CAPSULE | Refills: 0 | Status: SHIPPED | OUTPATIENT
Start: 2024-07-23

## 2024-07-23 RX ORDER — HYDROXYZINE 50 MG/1
50 TABLET, FILM COATED ORAL 2 TIMES DAILY PRN
Qty: 60 TABLET | Refills: 0 | Status: SHIPPED | OUTPATIENT
Start: 2024-07-23

## 2024-08-23 ENCOUNTER — TELEPHONE (OUTPATIENT)
Dept: BEHAVIORAL HEALTH | Age: 39
End: 2024-08-23

## 2024-08-23 ENCOUNTER — APPOINTMENT (OUTPATIENT)
Dept: BEHAVIORAL HEALTH | Age: 39
End: 2024-08-23

## 2024-08-23 DIAGNOSIS — F51.01 PRIMARY INSOMNIA: ICD-10-CM

## 2024-08-23 DIAGNOSIS — F43.10 PTSD (POST-TRAUMATIC STRESS DISORDER): ICD-10-CM

## 2024-08-23 DIAGNOSIS — F41.9 ANXIETY: ICD-10-CM

## 2024-08-23 DIAGNOSIS — F31.32 BIPOLAR AFFECTIVE DISORDER, CURRENTLY DEPRESSED, MODERATE  (CMD): Primary | ICD-10-CM

## 2024-08-23 RX ORDER — PRAZOSIN HYDROCHLORIDE 5 MG/1
CAPSULE ORAL
Qty: 90 CAPSULE | Refills: 0 | Status: SHIPPED | OUTPATIENT
Start: 2024-08-23

## 2024-08-23 RX ORDER — LURASIDONE HYDROCHLORIDE 60 MG/1
60 TABLET, FILM COATED ORAL DAILY
Qty: 30 TABLET | Refills: 0 | Status: SHIPPED | OUTPATIENT
Start: 2024-08-23

## 2024-08-23 RX ORDER — HYDROXYZINE HYDROCHLORIDE 50 MG/1
50 TABLET, FILM COATED ORAL 2 TIMES DAILY PRN
Qty: 60 TABLET | Refills: 0 | Status: SHIPPED | OUTPATIENT
Start: 2024-08-23

## 2024-08-23 RX ORDER — LAMOTRIGINE 150 MG/1
150 TABLET ORAL 2 TIMES DAILY
Qty: 60 TABLET | Refills: 0 | Status: SHIPPED | OUTPATIENT
Start: 2024-08-23

## 2024-09-04 ENCOUNTER — TELEPHONE (OUTPATIENT)
Dept: BEHAVIORAL HEALTH | Age: 39
End: 2024-09-04

## 2024-09-13 DIAGNOSIS — R00.2 PALPITATIONS: Primary | ICD-10-CM

## 2024-09-20 ENCOUNTER — APPOINTMENT (OUTPATIENT)
Dept: BEHAVIORAL HEALTH | Age: 39
End: 2024-09-20

## 2024-09-20 DIAGNOSIS — F43.10 PTSD (POST-TRAUMATIC STRESS DISORDER): ICD-10-CM

## 2024-09-20 DIAGNOSIS — F41.9 ANXIETY: ICD-10-CM

## 2024-09-20 DIAGNOSIS — F31.32 BIPOLAR AFFECTIVE DISORDER, CURRENTLY DEPRESSED, MODERATE  (CMD): Primary | ICD-10-CM

## 2024-09-20 DIAGNOSIS — F51.01 PRIMARY INSOMNIA: ICD-10-CM

## 2024-09-20 RX ORDER — LURASIDONE HYDROCHLORIDE 60 MG/1
60 TABLET, FILM COATED ORAL DAILY
Qty: 30 TABLET | Refills: 0 | Status: SHIPPED | OUTPATIENT
Start: 2024-09-20

## 2024-09-20 RX ORDER — HYDROXYZINE HYDROCHLORIDE 50 MG/1
50 TABLET, FILM COATED ORAL 2 TIMES DAILY PRN
Qty: 60 TABLET | Refills: 0 | Status: SHIPPED | OUTPATIENT
Start: 2024-09-20

## 2024-09-20 RX ORDER — TRAZODONE HYDROCHLORIDE 100 MG/1
100-200 TABLET ORAL NIGHTLY
Qty: 60 TABLET | Refills: 0 | Status: SHIPPED | OUTPATIENT
Start: 2024-09-20

## 2024-09-20 RX ORDER — PRAZOSIN HYDROCHLORIDE 5 MG/1
CAPSULE ORAL
Qty: 90 CAPSULE | Refills: 0 | Status: SHIPPED | OUTPATIENT
Start: 2024-09-20

## 2024-09-20 RX ORDER — LAMOTRIGINE 150 MG/1
150 TABLET ORAL 2 TIMES DAILY
Qty: 60 TABLET | Refills: 0 | Status: SHIPPED | OUTPATIENT
Start: 2024-09-20

## 2024-09-27 ENCOUNTER — TELEPHONE (OUTPATIENT)
Dept: NEUROLOGY | Age: 39
End: 2024-09-27

## 2024-09-27 ENCOUNTER — APPOINTMENT (OUTPATIENT)
Dept: CARDIOLOGY | Age: 39
End: 2024-09-27
Attending: INTERNAL MEDICINE

## 2024-10-03 ENCOUNTER — OFFICE VISIT (OUTPATIENT)
Dept: NEUROLOGY | Age: 39
End: 2024-10-03

## 2024-10-03 VITALS
OXYGEN SATURATION: 100 % | HEIGHT: 65 IN | WEIGHT: 190 LBS | HEART RATE: 85 BPM | BODY MASS INDEX: 31.65 KG/M2 | SYSTOLIC BLOOD PRESSURE: 97 MMHG | DIASTOLIC BLOOD PRESSURE: 63 MMHG

## 2024-10-03 DIAGNOSIS — F44.5 PSYCHOGENIC NONEPILEPTIC SEIZURE: ICD-10-CM

## 2024-10-03 DIAGNOSIS — G43.101 MIGRAINE WITH AURA AND WITH STATUS MIGRAINOSUS, NOT INTRACTABLE: Primary | ICD-10-CM

## 2024-10-03 PROCEDURE — 99215 OFFICE O/P EST HI 40 MIN: CPT | Performed by: PSYCHIATRY & NEUROLOGY

## 2024-10-03 PROCEDURE — G2211 COMPLEX E/M VISIT ADD ON: HCPCS | Performed by: PSYCHIATRY & NEUROLOGY

## 2024-10-03 RX ORDER — ONDANSETRON 4 MG/1
4 TABLET, ORALLY DISINTEGRATING ORAL EVERY 8 HOURS PRN
Qty: 30 TABLET | Refills: 5 | Status: SHIPPED | OUTPATIENT
Start: 2024-10-03

## 2024-10-03 ASSESSMENT — PAIN SCALES - GENERAL: PAINLEVEL: 6

## 2024-10-16 DIAGNOSIS — F51.01 PRIMARY INSOMNIA: ICD-10-CM

## 2024-10-16 DIAGNOSIS — F43.10 PTSD (POST-TRAUMATIC STRESS DISORDER): ICD-10-CM

## 2024-10-16 DIAGNOSIS — F31.32 BIPOLAR AFFECTIVE DISORDER, CURRENTLY DEPRESSED, MODERATE  (CMD): ICD-10-CM

## 2024-10-16 RX ORDER — TRAZODONE HYDROCHLORIDE 100 MG/1
100-200 TABLET ORAL NIGHTLY
Qty: 60 TABLET | Refills: 0 | OUTPATIENT
Start: 2024-10-16

## 2024-10-16 RX ORDER — PRAZOSIN HYDROCHLORIDE 5 MG/1
CAPSULE ORAL
Qty: 90 CAPSULE | Refills: 0 | OUTPATIENT
Start: 2024-10-16

## 2024-10-16 RX ORDER — LURASIDONE HYDROCHLORIDE 60 MG/1
TABLET, FILM COATED ORAL
Qty: 30 TABLET | Refills: 0 | OUTPATIENT
Start: 2024-10-16

## 2024-10-17 ENCOUNTER — TELEPHONE (OUTPATIENT)
Dept: BEHAVIORAL HEALTH | Age: 39
End: 2024-10-17

## 2024-10-18 DIAGNOSIS — F43.10 PTSD (POST-TRAUMATIC STRESS DISORDER): ICD-10-CM

## 2024-10-18 RX ORDER — PRAZOSIN HYDROCHLORIDE 5 MG/1
CAPSULE ORAL
Qty: 21 CAPSULE | Refills: 0 | Status: SHIPPED | OUTPATIENT
Start: 2024-10-18

## 2024-10-24 ENCOUNTER — APPOINTMENT (OUTPATIENT)
Dept: BEHAVIORAL HEALTH | Age: 39
End: 2024-10-24

## 2024-10-24 DIAGNOSIS — F31.32 BIPOLAR AFFECTIVE DISORDER, CURRENTLY DEPRESSED, MODERATE  (CMD): Primary | ICD-10-CM

## 2024-10-24 DIAGNOSIS — F51.01 PRIMARY INSOMNIA: ICD-10-CM

## 2024-10-24 DIAGNOSIS — F43.10 PTSD (POST-TRAUMATIC STRESS DISORDER): ICD-10-CM

## 2024-10-24 DIAGNOSIS — F41.9 ANXIETY: ICD-10-CM

## 2024-10-24 PROCEDURE — 99214 OFFICE O/P EST MOD 30 MIN: CPT | Performed by: PSYCHIATRY & NEUROLOGY

## 2024-10-24 RX ORDER — TRAZODONE HYDROCHLORIDE 100 MG/1
100-200 TABLET ORAL NIGHTLY
Qty: 60 TABLET | Refills: 0 | Status: SHIPPED | OUTPATIENT
Start: 2024-10-24

## 2024-10-24 RX ORDER — LAMOTRIGINE 150 MG/1
150 TABLET ORAL 2 TIMES DAILY
Qty: 60 TABLET | Refills: 0 | Status: SHIPPED | OUTPATIENT
Start: 2024-10-24

## 2024-10-24 RX ORDER — HYDROXYZINE HYDROCHLORIDE 50 MG/1
50 TABLET, FILM COATED ORAL 2 TIMES DAILY PRN
Qty: 60 TABLET | Refills: 0 | Status: SHIPPED | OUTPATIENT
Start: 2024-10-24

## 2024-10-24 RX ORDER — LURASIDONE HYDROCHLORIDE 60 MG/1
60 TABLET, FILM COATED ORAL DAILY
Qty: 30 TABLET | Refills: 0 | Status: SHIPPED | OUTPATIENT
Start: 2024-10-24

## 2024-10-24 RX ORDER — PRAZOSIN HYDROCHLORIDE 5 MG/1
CAPSULE ORAL
Qty: 90 CAPSULE | Refills: 0 | Status: SHIPPED | OUTPATIENT
Start: 2024-10-24

## 2024-11-21 DIAGNOSIS — F43.10 PTSD (POST-TRAUMATIC STRESS DISORDER): ICD-10-CM

## 2024-11-21 RX ORDER — PRAZOSIN HYDROCHLORIDE 5 MG/1
CAPSULE ORAL
Qty: 90 CAPSULE | Refills: 0 | Status: SHIPPED | OUTPATIENT
Start: 2024-11-21

## 2024-11-26 ENCOUNTER — APPOINTMENT (OUTPATIENT)
Dept: BEHAVIORAL HEALTH | Age: 39
End: 2024-11-26

## 2024-11-26 DIAGNOSIS — F43.10 PTSD (POST-TRAUMATIC STRESS DISORDER): ICD-10-CM

## 2024-11-26 DIAGNOSIS — F51.01 PRIMARY INSOMNIA: ICD-10-CM

## 2024-11-26 DIAGNOSIS — F41.9 ANXIETY: ICD-10-CM

## 2024-11-26 DIAGNOSIS — F31.32 BIPOLAR AFFECTIVE DISORDER, CURRENTLY DEPRESSED, MODERATE  (CMD): Primary | ICD-10-CM

## 2024-11-26 PROCEDURE — 99214 OFFICE O/P EST MOD 30 MIN: CPT | Performed by: PSYCHIATRY & NEUROLOGY

## 2024-11-26 RX ORDER — PRAZOSIN HYDROCHLORIDE 5 MG/1
CAPSULE ORAL
Qty: 90 CAPSULE | Refills: 0 | Status: SHIPPED | OUTPATIENT
Start: 2024-11-26

## 2024-11-26 RX ORDER — HYDROXYZINE HYDROCHLORIDE 50 MG/1
50 TABLET, FILM COATED ORAL 2 TIMES DAILY PRN
Qty: 60 TABLET | Refills: 0 | Status: SHIPPED | OUTPATIENT
Start: 2024-11-26

## 2024-11-26 RX ORDER — TRAZODONE HYDROCHLORIDE 100 MG/1
100-200 TABLET ORAL NIGHTLY
Qty: 60 TABLET | Refills: 0 | Status: SHIPPED | OUTPATIENT
Start: 2024-11-26

## 2024-11-26 RX ORDER — LURASIDONE HYDROCHLORIDE 60 MG/1
60 TABLET, FILM COATED ORAL DAILY
Qty: 30 TABLET | Refills: 0 | Status: SHIPPED | OUTPATIENT
Start: 2024-11-26

## 2024-11-26 RX ORDER — LAMOTRIGINE 150 MG/1
150 TABLET ORAL 2 TIMES DAILY
Qty: 60 TABLET | Refills: 0 | Status: SHIPPED | OUTPATIENT
Start: 2024-11-26

## 2024-12-18 ENCOUNTER — TELEPHONE (OUTPATIENT)
Dept: NEUROLOGY | Age: 39
End: 2024-12-18

## 2024-12-20 ENCOUNTER — TELEPHONE (OUTPATIENT)
Dept: NEUROLOGY | Age: 39
End: 2024-12-20

## 2024-12-20 DIAGNOSIS — G43.101 MIGRAINE WITH AURA AND WITH STATUS MIGRAINOSUS, NOT INTRACTABLE: Chronic | ICD-10-CM

## 2024-12-23 ENCOUNTER — E-ADVICE (OUTPATIENT)
Dept: NEUROLOGY | Age: 39
End: 2024-12-23

## 2024-12-28 DIAGNOSIS — F51.01 PRIMARY INSOMNIA: ICD-10-CM

## 2024-12-28 DIAGNOSIS — F31.32 BIPOLAR AFFECTIVE DISORDER, CURRENTLY DEPRESSED, MODERATE  (CMD): ICD-10-CM

## 2024-12-28 DIAGNOSIS — F41.9 ANXIETY: ICD-10-CM

## 2024-12-30 ENCOUNTER — TELEPHONE (OUTPATIENT)
Dept: NEUROLOGY | Age: 39
End: 2024-12-30

## 2024-12-30 DIAGNOSIS — F41.9 ANXIETY: ICD-10-CM

## 2024-12-30 DIAGNOSIS — F31.32 BIPOLAR AFFECTIVE DISORDER, CURRENTLY DEPRESSED, MODERATE  (CMD): ICD-10-CM

## 2024-12-30 RX ORDER — LAMOTRIGINE 150 MG/1
150 TABLET ORAL 2 TIMES DAILY
Qty: 22 TABLET | Refills: 0 | Status: SHIPPED | OUTPATIENT
Start: 2024-12-30

## 2024-12-30 RX ORDER — VORTIOXETINE 20 MG/1
20 TABLET, FILM COATED ORAL DAILY
Qty: 30 TABLET | Refills: 0 | OUTPATIENT
Start: 2024-12-30

## 2024-12-30 RX ORDER — LAMOTRIGINE 150 MG/1
150 TABLET ORAL 2 TIMES DAILY
Qty: 60 TABLET | Refills: 0 | OUTPATIENT
Start: 2024-12-30

## 2024-12-30 RX ORDER — HYDROXYZINE HYDROCHLORIDE 50 MG/1
50 TABLET, FILM COATED ORAL 2 TIMES DAILY PRN
Qty: 22 TABLET | Refills: 0 | Status: SHIPPED | OUTPATIENT
Start: 2024-12-30

## 2024-12-30 RX ORDER — HYDROXYZINE HYDROCHLORIDE 50 MG/1
50 TABLET, FILM COATED ORAL 2 TIMES DAILY PRN
Qty: 60 TABLET | Refills: 0 | OUTPATIENT
Start: 2024-12-30

## 2024-12-30 RX ORDER — LURASIDONE HYDROCHLORIDE 60 MG/1
60 TABLET, FILM COATED ORAL DAILY
Qty: 11 EACH | Refills: 0 | Status: SHIPPED | OUTPATIENT
Start: 2024-12-30

## 2024-12-30 RX ORDER — TRAZODONE HYDROCHLORIDE 100 MG/1
100-200 TABLET ORAL NIGHTLY
Qty: 60 TABLET | Refills: 0 | OUTPATIENT
Start: 2024-12-30

## 2024-12-30 RX ORDER — LURASIDONE HYDROCHLORIDE 60 MG/1
60 TABLET, FILM COATED ORAL DAILY
Qty: 30 TABLET | Refills: 0 | OUTPATIENT
Start: 2024-12-30

## 2025-01-09 ENCOUNTER — APPOINTMENT (OUTPATIENT)
Age: 40
End: 2025-01-09

## 2025-01-09 DIAGNOSIS — F43.10 PTSD (POST-TRAUMATIC STRESS DISORDER): ICD-10-CM

## 2025-01-09 DIAGNOSIS — F51.01 PRIMARY INSOMNIA: ICD-10-CM

## 2025-01-09 DIAGNOSIS — F41.9 ANXIETY: ICD-10-CM

## 2025-01-09 DIAGNOSIS — F31.32 BIPOLAR AFFECTIVE DISORDER, CURRENTLY DEPRESSED, MODERATE  (CMD): Primary | ICD-10-CM

## 2025-01-09 RX ORDER — PRAZOSIN HYDROCHLORIDE 5 MG/1
CAPSULE ORAL
Qty: 90 CAPSULE | Refills: 0 | Status: SHIPPED | OUTPATIENT
Start: 2025-01-09

## 2025-01-09 RX ORDER — LAMOTRIGINE 150 MG/1
150 TABLET ORAL 2 TIMES DAILY
Qty: 60 TABLET | Refills: 0 | Status: SHIPPED | OUTPATIENT
Start: 2025-01-09

## 2025-01-09 RX ORDER — LURASIDONE HYDROCHLORIDE 60 MG/1
60 TABLET, FILM COATED ORAL
Qty: 30 EACH | Refills: 0 | Status: SHIPPED | OUTPATIENT
Start: 2025-01-09

## 2025-01-09 RX ORDER — TRAZODONE HYDROCHLORIDE 100 MG/1
100-200 TABLET ORAL NIGHTLY
Qty: 60 TABLET | Refills: 0 | Status: SHIPPED | OUTPATIENT
Start: 2025-01-09

## 2025-01-09 RX ORDER — HYDROXYZINE HYDROCHLORIDE 50 MG/1
50 TABLET, FILM COATED ORAL 2 TIMES DAILY PRN
Qty: 60 TABLET | Refills: 0 | Status: SHIPPED | OUTPATIENT
Start: 2025-01-09

## 2025-01-17 ENCOUNTER — TELEPHONE (OUTPATIENT)
Dept: NEUROLOGY | Age: 40
End: 2025-01-17

## 2025-01-23 ENCOUNTER — APPOINTMENT (OUTPATIENT)
Dept: NEUROLOGY | Age: 40
End: 2025-01-23

## 2025-01-23 DIAGNOSIS — F44.5 PSYCHOGENIC NONEPILEPTIC SEIZURE: Primary | ICD-10-CM

## 2025-01-23 DIAGNOSIS — G43.101 MIGRAINE WITH AURA AND WITH STATUS MIGRAINOSUS, NOT INTRACTABLE: ICD-10-CM

## 2025-01-23 DIAGNOSIS — F41.9 ANXIETY: ICD-10-CM

## 2025-01-23 DIAGNOSIS — M79.7 FIBROMYALGIA SYNDROME: ICD-10-CM

## 2025-01-23 PROCEDURE — 99215 OFFICE O/P EST HI 40 MIN: CPT | Performed by: PSYCHIATRY & NEUROLOGY

## 2025-02-06 ENCOUNTER — APPOINTMENT (OUTPATIENT)
Age: 40
End: 2025-02-06

## 2025-02-06 DIAGNOSIS — F43.10 PTSD (POST-TRAUMATIC STRESS DISORDER): ICD-10-CM

## 2025-02-06 DIAGNOSIS — F51.01 PRIMARY INSOMNIA: ICD-10-CM

## 2025-02-06 DIAGNOSIS — F41.9 ANXIETY: ICD-10-CM

## 2025-02-06 DIAGNOSIS — F31.32 BIPOLAR AFFECTIVE DISORDER, CURRENTLY DEPRESSED, MODERATE  (CMD): Primary | ICD-10-CM

## 2025-02-06 PROCEDURE — 99214 OFFICE O/P EST MOD 30 MIN: CPT | Performed by: PSYCHIATRY & NEUROLOGY

## 2025-02-06 RX ORDER — TRAZODONE HYDROCHLORIDE 100 MG/1
100-200 TABLET ORAL NIGHTLY
Qty: 60 TABLET | Refills: 0 | Status: SHIPPED | OUTPATIENT
Start: 2025-02-06

## 2025-02-06 RX ORDER — LAMOTRIGINE 150 MG/1
150 TABLET ORAL 2 TIMES DAILY
Qty: 60 TABLET | Refills: 0 | Status: SHIPPED | OUTPATIENT
Start: 2025-02-06

## 2025-02-06 RX ORDER — PRAZOSIN HYDROCHLORIDE 5 MG/1
CAPSULE ORAL
Qty: 90 CAPSULE | Refills: 0 | Status: SHIPPED | OUTPATIENT
Start: 2025-02-06

## 2025-02-06 RX ORDER — LURASIDONE HYDROCHLORIDE 60 MG/1
60 TABLET, FILM COATED ORAL
Qty: 30 EACH | Refills: 0 | Status: SHIPPED | OUTPATIENT
Start: 2025-02-06

## 2025-02-06 RX ORDER — HYDROXYZINE HYDROCHLORIDE 50 MG/1
50 TABLET, FILM COATED ORAL 2 TIMES DAILY PRN
Qty: 60 TABLET | Refills: 0 | Status: SHIPPED | OUTPATIENT
Start: 2025-02-06

## 2025-03-03 ENCOUNTER — APPOINTMENT (OUTPATIENT)
Dept: URGENT CARE | Age: 40
End: 2025-03-03

## 2025-03-04 ENCOUNTER — APPOINTMENT (OUTPATIENT)
Dept: INTERNAL MEDICINE | Age: 40
End: 2025-03-04

## 2025-03-10 ENCOUNTER — APPOINTMENT (OUTPATIENT)
Age: 40
End: 2025-03-10

## 2025-03-10 DIAGNOSIS — F51.01 PRIMARY INSOMNIA: ICD-10-CM

## 2025-03-10 DIAGNOSIS — F41.9 ANXIETY: ICD-10-CM

## 2025-03-10 DIAGNOSIS — F31.32 BIPOLAR AFFECTIVE DISORDER, CURRENTLY DEPRESSED, MODERATE  (CMD): Primary | ICD-10-CM

## 2025-03-10 DIAGNOSIS — F43.10 PTSD (POST-TRAUMATIC STRESS DISORDER): ICD-10-CM

## 2025-03-10 PROCEDURE — 99214 OFFICE O/P EST MOD 30 MIN: CPT | Performed by: PSYCHIATRY & NEUROLOGY

## 2025-03-10 RX ORDER — TRAZODONE HYDROCHLORIDE 100 MG/1
100-200 TABLET ORAL NIGHTLY
Qty: 60 TABLET | Refills: 0 | Status: SHIPPED | OUTPATIENT
Start: 2025-03-10

## 2025-03-10 RX ORDER — LAMOTRIGINE 150 MG/1
150 TABLET ORAL 2 TIMES DAILY
Qty: 60 TABLET | Refills: 0 | Status: SHIPPED | OUTPATIENT
Start: 2025-03-10

## 2025-03-10 RX ORDER — PRAZOSIN HYDROCHLORIDE 5 MG/1
CAPSULE ORAL
Qty: 90 CAPSULE | Refills: 0 | Status: SHIPPED | OUTPATIENT
Start: 2025-03-10

## 2025-03-10 RX ORDER — LURASIDONE HYDROCHLORIDE 60 MG/1
60 TABLET, FILM COATED ORAL
Qty: 30 EACH | Refills: 0 | Status: SHIPPED | OUTPATIENT
Start: 2025-03-10

## 2025-03-23 ENCOUNTER — APPOINTMENT (OUTPATIENT)
Dept: CT IMAGING | Age: 40
End: 2025-03-23
Attending: EMERGENCY MEDICINE

## 2025-03-23 ENCOUNTER — HOSPITAL ENCOUNTER (EMERGENCY)
Age: 40
Discharge: HOME | End: 2025-03-23
Attending: EMERGENCY MEDICINE

## 2025-03-23 VITALS
WEIGHT: 188.27 LBS | BODY MASS INDEX: 31.33 KG/M2 | HEART RATE: 77 BPM | DIASTOLIC BLOOD PRESSURE: 87 MMHG | SYSTOLIC BLOOD PRESSURE: 120 MMHG | OXYGEN SATURATION: 95 % | RESPIRATION RATE: 16 BRPM | TEMPERATURE: 99.1 F

## 2025-03-23 DIAGNOSIS — R41.0 CONFUSION: Primary | ICD-10-CM

## 2025-03-23 DIAGNOSIS — R10.84 GENERALIZED ABDOMINAL PAIN: ICD-10-CM

## 2025-03-23 LAB
ALBUMIN SERPL-MCNC: 4.2 G/DL (ref 3.4–5)
ALP SERPL-CCNC: 45 UNITS/L (ref 45–117)
ALT SERPL-CCNC: 16 UNITS/L
ANION GAP SERPL CALC-SCNC: 7 MMOL/L (ref 7–19)
APPEARANCE UR: CLEAR
AST SERPL-CCNC: 10 UNITS/L
BACTERIA #/AREA URNS HPF: ABNORMAL /HPF
BASOPHILS # BLD: 0.1 K/MCL (ref 0–0.3)
BASOPHILS NFR BLD: 1 %
BILIRUB CONJ SERPL-MCNC: 0.1 MG/DL (ref 0–0.2)
BILIRUB SERPL-MCNC: 0.5 MG/DL (ref 0.2–1)
BILIRUB UR QL STRIP: NEGATIVE
BUN SERPL-MCNC: 7 MG/DL (ref 6–20)
BUN/CREAT SERPL: 9 (ref 7–25)
CALCIUM SERPL-MCNC: 9.1 MG/DL (ref 8.4–10.2)
CHLORIDE SERPL-SCNC: 106 MMOL/L (ref 97–110)
CO2 SERPL-SCNC: 26 MMOL/L (ref 21–32)
COLOR UR: COLORLESS
CREAT SERPL-MCNC: 0.76 MG/DL (ref 0.51–0.95)
DEPRECATED RDW RBC: 32.5 FL (ref 39–50)
EGFRCR SERPLBLD CKD-EPI 2021: >90 ML/MIN/{1.73_M2}
EOSINOPHIL # BLD: 0.3 K/MCL (ref 0–0.5)
EOSINOPHIL NFR BLD: 3 %
ERYTHROCYTE [DISTWIDTH] IN BLOOD: 17.2 % (ref 11–15)
FASTING DURATION TIME PATIENT: ABNORMAL H
GLUCOSE BLDC GLUCOMTR-MCNC: 87 MG/DL (ref 70–99)
GLUCOSE SERPL-MCNC: 65 MG/DL (ref 70–99)
GLUCOSE UR STRIP-MCNC: NEGATIVE MG/DL
HCG UR QL: NEGATIVE
HCT VFR BLD CALC: 37.8 % (ref 36–46.5)
HGB BLD-MCNC: 11.8 G/DL (ref 12–15.5)
HGB UR QL STRIP: NEGATIVE
HYALINE CASTS #/AREA URNS LPF: ABNORMAL /LPF
IMM GRANULOCYTES # BLD AUTO: 0 K/MCL (ref 0–0.2)
IMM GRANULOCYTES # BLD: 0 %
KETONES UR STRIP-MCNC: NEGATIVE MG/DL
LEUKOCYTE ESTERASE UR QL STRIP: NEGATIVE
LIPASE SERPL-CCNC: 34 UNITS/L (ref 15–77)
LYMPHOCYTES # BLD: 3 K/MCL (ref 1–4.8)
LYMPHOCYTES NFR BLD: 34 %
MCH RBC QN AUTO: 18.6 PG (ref 26–34)
MCHC RBC AUTO-ENTMCNC: 31.2 G/DL (ref 32–36.5)
MCV RBC AUTO: 59.7 FL (ref 78–100)
MONOCYTES # BLD: 0.5 K/MCL (ref 0.3–0.9)
MONOCYTES NFR BLD: 6 %
NEUTROPHILS # BLD: 4.9 K/MCL (ref 1.8–7.7)
NEUTROPHILS NFR BLD: 56 %
NITRITE UR QL STRIP: NEGATIVE
NRBC BLD MANUAL-RTO: 0 /100 WBC
PH UR STRIP: 6 [PH] (ref 5–7)
PLATELET # BLD AUTO: 432 K/MCL (ref 140–450)
POTASSIUM SERPL-SCNC: 3.3 MMOL/L (ref 3.4–5.1)
PROT SERPL-MCNC: 7.5 G/DL (ref 6.4–8.2)
PROT UR STRIP-MCNC: NEGATIVE MG/DL
RBC # BLD: 6.33 MIL/MCL (ref 4–5.2)
RBC #/AREA URNS HPF: ABNORMAL /HPF
SODIUM SERPL-SCNC: 136 MMOL/L (ref 135–145)
SP GR UR STRIP: <1.005 (ref 1–1.03)
SQUAMOUS #/AREA URNS HPF: ABNORMAL /HPF
UROBILINOGEN UR STRIP-MCNC: 0.2 MG/DL
WBC # BLD: 8.8 K/MCL (ref 4.2–11)
WBC #/AREA URNS HPF: ABNORMAL /HPF

## 2025-03-23 PROCEDURE — 36415 COLL VENOUS BLD VENIPUNCTURE: CPT | Performed by: EMERGENCY MEDICINE

## 2025-03-23 PROCEDURE — 81001 URINALYSIS AUTO W/SCOPE: CPT | Performed by: EMERGENCY MEDICINE

## 2025-03-23 PROCEDURE — 80076 HEPATIC FUNCTION PANEL: CPT | Performed by: EMERGENCY MEDICINE

## 2025-03-23 PROCEDURE — 82962 GLUCOSE BLOOD TEST: CPT

## 2025-03-23 PROCEDURE — 84703 CHORIONIC GONADOTROPIN ASSAY: CPT

## 2025-03-23 PROCEDURE — 96374 THER/PROPH/DIAG INJ IV PUSH: CPT

## 2025-03-23 PROCEDURE — 70450 CT HEAD/BRAIN W/O DYE: CPT

## 2025-03-23 PROCEDURE — 85025 COMPLETE CBC W/AUTO DIFF WBC: CPT | Performed by: EMERGENCY MEDICINE

## 2025-03-23 PROCEDURE — 10002800 HB RX 250 W HCPCS: Performed by: EMERGENCY MEDICINE

## 2025-03-23 PROCEDURE — 74177 CT ABD & PELVIS W/CONTRAST: CPT

## 2025-03-23 PROCEDURE — 10002805 HB CONTRAST AGENT: Performed by: EMERGENCY MEDICINE

## 2025-03-23 PROCEDURE — 80048 BASIC METABOLIC PNL TOTAL CA: CPT | Performed by: EMERGENCY MEDICINE

## 2025-03-23 PROCEDURE — 99284 EMERGENCY DEPT VISIT MOD MDM: CPT

## 2025-03-23 PROCEDURE — 83690 ASSAY OF LIPASE: CPT | Performed by: EMERGENCY MEDICINE

## 2025-03-23 RX ORDER — KETOROLAC TROMETHAMINE 15 MG/ML
15 INJECTION, SOLUTION INTRAMUSCULAR; INTRAVENOUS ONCE
Status: COMPLETED | OUTPATIENT
Start: 2025-03-23 | End: 2025-03-23

## 2025-03-23 RX ADMIN — IOHEXOL 80 ML: 350 INJECTION, SOLUTION INTRAVENOUS at 19:15

## 2025-03-23 RX ADMIN — KETOROLAC TROMETHAMINE 15 MG: 15 INJECTION, SOLUTION INTRAMUSCULAR; INTRAVENOUS at 19:04

## 2025-03-23 ASSESSMENT — PAIN SCALES - GENERAL: PAINLEVEL_OUTOF10: 8

## 2025-03-24 DIAGNOSIS — F41.9 ANXIETY: ICD-10-CM

## 2025-03-24 LAB
RAINBOW EXTRA TUBES HOLD SPECIMEN: NORMAL

## 2025-03-24 RX ORDER — HYDROXYZINE HYDROCHLORIDE 50 MG/1
50 TABLET, FILM COATED ORAL 2 TIMES DAILY PRN
Qty: 60 TABLET | Refills: 0 | Status: SHIPPED | OUTPATIENT
Start: 2025-03-24

## 2025-04-18 ENCOUNTER — TELEPHONE (OUTPATIENT)
Dept: NEUROLOGY | Age: 40
End: 2025-04-18

## 2025-04-22 DIAGNOSIS — F41.9 ANXIETY: ICD-10-CM

## 2025-04-22 DIAGNOSIS — F31.32 BIPOLAR AFFECTIVE DISORDER, CURRENTLY DEPRESSED, MODERATE  (CMD): ICD-10-CM

## 2025-04-22 DIAGNOSIS — F51.01 PRIMARY INSOMNIA: ICD-10-CM

## 2025-04-22 RX ORDER — LURASIDONE HYDROCHLORIDE 60 MG/1
60 TABLET, FILM COATED ORAL
Qty: 4 TABLET | Refills: 0 | Status: SHIPPED | OUTPATIENT
Start: 2025-04-22 | End: 2025-04-25 | Stop reason: SDUPTHER

## 2025-04-22 RX ORDER — TRAZODONE HYDROCHLORIDE 100 MG/1
100-200 TABLET ORAL NIGHTLY
Qty: 8 TABLET | Refills: 0 | Status: SHIPPED | OUTPATIENT
Start: 2025-04-22 | End: 2025-04-25 | Stop reason: SDUPTHER

## 2025-04-24 ENCOUNTER — APPOINTMENT (OUTPATIENT)
Dept: NEUROLOGY | Age: 40
End: 2025-04-24

## 2025-04-25 ENCOUNTER — APPOINTMENT (OUTPATIENT)
Age: 40
End: 2025-04-25

## 2025-04-25 DIAGNOSIS — F31.32 BIPOLAR AFFECTIVE DISORDER, CURRENTLY DEPRESSED, MODERATE  (CMD): Primary | ICD-10-CM

## 2025-04-25 DIAGNOSIS — F51.01 PRIMARY INSOMNIA: ICD-10-CM

## 2025-04-25 DIAGNOSIS — F43.10 PTSD (POST-TRAUMATIC STRESS DISORDER): ICD-10-CM

## 2025-04-25 DIAGNOSIS — F41.9 ANXIETY: ICD-10-CM

## 2025-04-25 PROCEDURE — 99214 OFFICE O/P EST MOD 30 MIN: CPT | Performed by: PSYCHIATRY & NEUROLOGY

## 2025-04-25 RX ORDER — LURASIDONE HYDROCHLORIDE 60 MG/1
60 TABLET, FILM COATED ORAL
Qty: 30 TABLET | Refills: 0 | Status: SHIPPED | OUTPATIENT
Start: 2025-04-25 | End: 2025-04-25 | Stop reason: SDUPTHER

## 2025-04-25 RX ORDER — TRAZODONE HYDROCHLORIDE 100 MG/1
100-200 TABLET ORAL NIGHTLY
Qty: 60 TABLET | Refills: 0 | Status: SHIPPED | OUTPATIENT
Start: 2025-04-25 | End: 2025-04-25 | Stop reason: SDUPTHER

## 2025-04-25 RX ORDER — LAMOTRIGINE 150 MG/1
150 TABLET ORAL 2 TIMES DAILY
Qty: 60 TABLET | Refills: 1 | Status: SHIPPED | OUTPATIENT
Start: 2025-04-25

## 2025-04-25 RX ORDER — LURASIDONE HYDROCHLORIDE 60 MG/1
60 TABLET, FILM COATED ORAL
Qty: 30 TABLET | Refills: 1 | Status: SHIPPED | OUTPATIENT
Start: 2025-04-25

## 2025-04-25 RX ORDER — LAMOTRIGINE 150 MG/1
150 TABLET ORAL 2 TIMES DAILY
Qty: 60 TABLET | Refills: 0 | Status: SHIPPED | OUTPATIENT
Start: 2025-04-25 | End: 2025-04-25 | Stop reason: SDUPTHER

## 2025-04-25 RX ORDER — TRAZODONE HYDROCHLORIDE 100 MG/1
100-200 TABLET ORAL NIGHTLY
Qty: 60 TABLET | Refills: 1 | Status: SHIPPED | OUTPATIENT
Start: 2025-04-25

## 2025-04-25 RX ORDER — HYDROXYZINE HYDROCHLORIDE 50 MG/1
50 TABLET, FILM COATED ORAL 2 TIMES DAILY PRN
Qty: 60 TABLET | Refills: 1 | Status: SHIPPED | OUTPATIENT
Start: 2025-04-25

## 2025-04-25 RX ORDER — HYDROXYZINE HYDROCHLORIDE 50 MG/1
50 TABLET, FILM COATED ORAL 2 TIMES DAILY PRN
Qty: 60 TABLET | Refills: 0 | Status: SHIPPED | OUTPATIENT
Start: 2025-04-25 | End: 2025-04-25 | Stop reason: SDUPTHER

## 2025-04-25 RX ORDER — PRAZOSIN HYDROCHLORIDE 5 MG/1
CAPSULE ORAL
Qty: 90 CAPSULE | Refills: 1 | Status: SHIPPED | OUTPATIENT
Start: 2025-04-25

## 2025-04-25 RX ORDER — PRAZOSIN HYDROCHLORIDE 5 MG/1
CAPSULE ORAL
Qty: 90 CAPSULE | Refills: 0 | Status: SHIPPED | OUTPATIENT
Start: 2025-04-25 | End: 2025-04-25 | Stop reason: SDUPTHER

## 2025-06-10 ENCOUNTER — APPOINTMENT (OUTPATIENT)
Age: 40
End: 2025-06-10

## 2025-06-10 DIAGNOSIS — F31.32 BIPOLAR AFFECTIVE DISORDER, CURRENTLY DEPRESSED, MODERATE  (CMD): Primary | ICD-10-CM

## 2025-06-10 DIAGNOSIS — F51.01 PRIMARY INSOMNIA: ICD-10-CM

## 2025-06-10 DIAGNOSIS — F43.10 PTSD (POST-TRAUMATIC STRESS DISORDER): ICD-10-CM

## 2025-06-10 DIAGNOSIS — F41.9 ANXIETY: ICD-10-CM

## 2025-06-10 PROCEDURE — 99214 OFFICE O/P EST MOD 30 MIN: CPT | Performed by: PSYCHIATRY & NEUROLOGY

## 2025-06-10 RX ORDER — HYDROXYZINE HYDROCHLORIDE 50 MG/1
50 TABLET, FILM COATED ORAL 2 TIMES DAILY PRN
Qty: 60 TABLET | Refills: 0 | Status: SHIPPED | OUTPATIENT
Start: 2025-06-10

## 2025-06-10 RX ORDER — LAMOTRIGINE 150 MG/1
150 TABLET ORAL 2 TIMES DAILY
Qty: 60 TABLET | Refills: 0 | Status: SHIPPED | OUTPATIENT
Start: 2025-06-10

## 2025-06-10 RX ORDER — TRAZODONE HYDROCHLORIDE 100 MG/1
100-200 TABLET ORAL NIGHTLY
Qty: 60 TABLET | Refills: 0 | Status: SHIPPED | OUTPATIENT
Start: 2025-06-10

## 2025-06-10 RX ORDER — LURASIDONE HYDROCHLORIDE 80 MG/1
80 TABLET, FILM COATED ORAL
Qty: 30 TABLET | Refills: 0 | Status: SHIPPED | OUTPATIENT
Start: 2025-06-10

## 2025-06-10 RX ORDER — PRAZOSIN HYDROCHLORIDE 5 MG/1
CAPSULE ORAL
Qty: 90 CAPSULE | Refills: 0 | Status: SHIPPED | OUTPATIENT
Start: 2025-06-10

## 2025-06-11 ENCOUNTER — TELEPHONE (OUTPATIENT)
Age: 40
End: 2025-06-11

## 2025-06-13 ENCOUNTER — TELEPHONE (OUTPATIENT)
Dept: NEUROLOGY | Age: 40
End: 2025-06-13

## 2025-06-19 ENCOUNTER — APPOINTMENT (OUTPATIENT)
Dept: NEUROLOGY | Age: 40
End: 2025-06-19

## 2025-06-19 VITALS
DIASTOLIC BLOOD PRESSURE: 66 MMHG | OXYGEN SATURATION: 98 % | HEIGHT: 65 IN | WEIGHT: 188.7 LBS | BODY MASS INDEX: 31.44 KG/M2 | HEART RATE: 75 BPM | SYSTOLIC BLOOD PRESSURE: 91 MMHG

## 2025-06-19 DIAGNOSIS — M54.32 BILATERAL SCIATICA: ICD-10-CM

## 2025-06-19 DIAGNOSIS — M54.31 BILATERAL SCIATICA: ICD-10-CM

## 2025-06-19 DIAGNOSIS — G44.059 SUNCT (SHORT UNILATERAL NEURALGIFORM HEADACHE, CONJUNCTIVAL INJ/TEAR): ICD-10-CM

## 2025-06-19 DIAGNOSIS — F48.8 PSYCHOGENIC SYNCOPE: ICD-10-CM

## 2025-06-19 DIAGNOSIS — F44.5 PSYCHOGENIC NONEPILEPTIC SEIZURE: Primary | ICD-10-CM

## 2025-06-19 DIAGNOSIS — G43.101 MIGRAINE WITH AURA AND WITH STATUS MIGRAINOSUS, NOT INTRACTABLE: Chronic | ICD-10-CM

## 2025-06-19 PROCEDURE — 99215 OFFICE O/P EST HI 40 MIN: CPT | Performed by: PSYCHIATRY & NEUROLOGY

## 2025-06-19 PROCEDURE — G2211 COMPLEX E/M VISIT ADD ON: HCPCS | Performed by: PSYCHIATRY & NEUROLOGY

## 2025-06-19 RX ORDER — TOPIRAMATE 100 MG/1
100 TABLET, FILM COATED ORAL EVERY 12 HOURS SCHEDULED
Qty: 180 TABLET | Refills: 3 | Status: SHIPPED | OUTPATIENT
Start: 2025-06-19

## 2025-06-19 RX ORDER — GABAPENTIN 400 MG/1
400 CAPSULE ORAL 4 TIMES DAILY
Qty: 360 CAPSULE | Refills: 3 | Status: SHIPPED | OUTPATIENT
Start: 2025-06-19 | End: 2026-06-19

## 2025-07-16 DIAGNOSIS — F31.32 BIPOLAR AFFECTIVE DISORDER, CURRENTLY DEPRESSED, MODERATE  (CMD): ICD-10-CM

## 2025-07-16 RX ORDER — LURASIDONE HYDROCHLORIDE 80 MG/1
80 TABLET, FILM COATED ORAL
Qty: 13 TABLET | Refills: 0 | Status: SHIPPED | OUTPATIENT
Start: 2025-07-16

## 2025-07-17 ENCOUNTER — APPOINTMENT (OUTPATIENT)
Dept: PHYSICAL MEDICINE AND REHAB | Age: 40
End: 2025-07-17
Attending: PSYCHIATRY & NEUROLOGY

## 2025-07-18 DIAGNOSIS — F51.01 PRIMARY INSOMNIA: ICD-10-CM

## 2025-07-24 ENCOUNTER — TELEPHONE (OUTPATIENT)
Dept: NEUROLOGY | Age: 40
End: 2025-07-24

## 2025-07-28 ENCOUNTER — APPOINTMENT (OUTPATIENT)
Age: 40
End: 2025-07-28

## 2025-07-28 DIAGNOSIS — F43.10 PTSD (POST-TRAUMATIC STRESS DISORDER): ICD-10-CM

## 2025-07-28 DIAGNOSIS — F31.32 BIPOLAR AFFECTIVE DISORDER, CURRENTLY DEPRESSED, MODERATE  (CMD): Primary | ICD-10-CM

## 2025-07-28 DIAGNOSIS — F41.9 ANXIETY: ICD-10-CM

## 2025-07-28 DIAGNOSIS — F51.01 PRIMARY INSOMNIA: ICD-10-CM

## 2025-07-28 PROCEDURE — 99214 OFFICE O/P EST MOD 30 MIN: CPT | Performed by: PSYCHIATRY & NEUROLOGY

## 2025-07-28 RX ORDER — TRAZODONE HYDROCHLORIDE 100 MG/1
100-200 TABLET ORAL NIGHTLY
Qty: 60 TABLET | Refills: 0 | Status: SHIPPED | OUTPATIENT
Start: 2025-07-28

## 2025-07-28 RX ORDER — PRAZOSIN HYDROCHLORIDE 5 MG/1
CAPSULE ORAL
Qty: 90 CAPSULE | Refills: 0 | Status: SHIPPED | OUTPATIENT
Start: 2025-07-28

## 2025-07-28 RX ORDER — LURASIDONE HYDROCHLORIDE 80 MG/1
80 TABLET, FILM COATED ORAL
Qty: 13 TABLET | Refills: 0 | Status: SHIPPED | OUTPATIENT
Start: 2025-07-28

## 2025-07-28 RX ORDER — LAMOTRIGINE 150 MG/1
150 TABLET ORAL 2 TIMES DAILY
Qty: 60 TABLET | Refills: 0 | Status: SHIPPED | OUTPATIENT
Start: 2025-07-28

## 2025-07-28 RX ORDER — HYDROXYZINE HYDROCHLORIDE 50 MG/1
50 TABLET, FILM COATED ORAL 2 TIMES DAILY PRN
Qty: 60 TABLET | Refills: 0 | Status: SHIPPED | OUTPATIENT
Start: 2025-07-28

## 2025-07-31 ENCOUNTER — APPOINTMENT (OUTPATIENT)
Dept: NEUROLOGY | Age: 40
End: 2025-07-31

## 2025-07-31 DIAGNOSIS — F44.5 PSYCHOGENIC NONEPILEPTIC SEIZURE: Primary | ICD-10-CM

## 2025-07-31 DIAGNOSIS — G43.101 MIGRAINE WITH AURA AND WITH STATUS MIGRAINOSUS, NOT INTRACTABLE: ICD-10-CM

## 2025-07-31 DIAGNOSIS — R55 VASOVAGAL SYNCOPE: ICD-10-CM

## 2025-07-31 RX ORDER — FREMANEZUMAB-VFRM 225 MG/1.5ML
225 INJECTION SUBCUTANEOUS
Qty: 1.5 ML | Refills: 11 | Status: SHIPPED | OUTPATIENT
Start: 2025-07-31

## 2025-07-31 RX ORDER — SUMATRIPTAN SUCCINATE 100 MG/1
100 TABLET ORAL PRN
Qty: 9 TABLET | Refills: 11 | Status: SHIPPED | OUTPATIENT
Start: 2025-07-31

## 2025-08-07 ENCOUNTER — APPOINTMENT (OUTPATIENT)
Dept: PHYSICAL MEDICINE AND REHAB | Age: 40
End: 2025-08-07

## 2025-08-20 DIAGNOSIS — F31.32 BIPOLAR AFFECTIVE DISORDER, CURRENTLY DEPRESSED, MODERATE  (CMD): ICD-10-CM

## 2025-08-20 RX ORDER — LURASIDONE HYDROCHLORIDE 80 MG/1
80 TABLET, FILM COATED ORAL
Qty: 13 TABLET | Refills: 0 | OUTPATIENT
Start: 2025-08-20

## 2025-08-22 ENCOUNTER — TELEPHONE (OUTPATIENT)
Dept: BEHAVIORAL HEALTH | Age: 40
End: 2025-08-22

## 2025-08-22 DIAGNOSIS — F31.32 BIPOLAR AFFECTIVE DISORDER, CURRENTLY DEPRESSED, MODERATE  (CMD): ICD-10-CM

## 2025-08-22 RX ORDER — LURASIDONE HYDROCHLORIDE 80 MG/1
80 TABLET, FILM COATED ORAL
Qty: 5 TABLET | Refills: 0 | Status: SHIPPED | OUTPATIENT
Start: 2025-08-22

## 2025-08-26 ENCOUNTER — APPOINTMENT (OUTPATIENT)
Age: 40
End: 2025-08-26

## 2025-08-26 DIAGNOSIS — F17.200 NICOTINE USE DISORDER: ICD-10-CM

## 2025-08-26 DIAGNOSIS — F31.32 BIPOLAR AFFECTIVE DISORDER, CURRENTLY DEPRESSED, MODERATE  (CMD): Primary | ICD-10-CM

## 2025-08-26 DIAGNOSIS — F41.9 ANXIETY: ICD-10-CM

## 2025-08-26 DIAGNOSIS — F51.01 PRIMARY INSOMNIA: ICD-10-CM

## 2025-08-26 DIAGNOSIS — F43.10 PTSD (POST-TRAUMATIC STRESS DISORDER): ICD-10-CM

## 2025-08-26 RX ORDER — NICOTINE 21 MG/24HR
1 PATCH, TRANSDERMAL 24 HOURS TRANSDERMAL EVERY 24 HOURS
Qty: 30 PATCH | Refills: 0 | Status: SHIPPED | OUTPATIENT
Start: 2025-08-26

## 2025-08-26 RX ORDER — PRAZOSIN HYDROCHLORIDE 5 MG/1
CAPSULE ORAL
Qty: 90 CAPSULE | Refills: 0 | Status: SHIPPED | OUTPATIENT
Start: 2025-08-26

## 2025-08-26 RX ORDER — LURASIDONE HYDROCHLORIDE 80 MG/1
80 TABLET, FILM COATED ORAL
Qty: 30 TABLET | Refills: 0 | Status: SHIPPED | OUTPATIENT
Start: 2025-08-26

## 2025-08-26 RX ORDER — LAMOTRIGINE 150 MG/1
150 TABLET ORAL 2 TIMES DAILY
Qty: 60 TABLET | Refills: 0 | Status: SHIPPED | OUTPATIENT
Start: 2025-08-26

## 2025-08-26 RX ORDER — HYDROXYZINE HYDROCHLORIDE 50 MG/1
50 TABLET, FILM COATED ORAL 2 TIMES DAILY PRN
Qty: 60 TABLET | Refills: 0 | Status: SHIPPED | OUTPATIENT
Start: 2025-08-26

## 2025-08-26 RX ORDER — TRAZODONE HYDROCHLORIDE 100 MG/1
100-200 TABLET ORAL NIGHTLY
Qty: 60 TABLET | Refills: 0 | Status: SHIPPED | OUTPATIENT
Start: 2025-08-26

## 2025-08-28 ENCOUNTER — APPOINTMENT (OUTPATIENT)
Dept: PHYSICAL MEDICINE AND REHAB | Age: 40
End: 2025-08-28

## 2025-11-06 ENCOUNTER — APPOINTMENT (OUTPATIENT)
Dept: NEUROLOGY | Age: 40
End: 2025-11-06

## (undated) DEVICE — CONMED SCOPE SAVER BITE BLOCK, 20X27 MM: Brand: SCOPE SAVER

## (undated) DEVICE — YANKAUER SUCTION INSTRUMENT NO CONTROL VENT, BULB TIP, CLEAR: Brand: YANKAUER

## (undated) DEVICE — KIT CLEAN ENDOKIT 1.1OZ GOWNX2

## (undated) DEVICE — Device: Brand: DUAL NARE NASAL CANNULAE FEMALE LUER CON 7FT O2 TUBE

## (undated) DEVICE — FORCEP RADIAL JAW 4

## (undated) DEVICE — MEDI-VAC NON-CONDUCTIVE SUCTION TUBING: Brand: CARDINAL HEALTH

## (undated) DEVICE — 60 ML SYRINGE REGULAR TIP: Brand: MONOJECT

## (undated) DEVICE — MEDI-VAC NON-CONDUCTIVE SUCTION TUBING 6MM X 1.8M (6FT.) L: Brand: CARDINAL HEALTH

## (undated) NOTE — ED AVS SNAPSHOT
Cami Coronado   MRN: [id number]    Department:  [facility] Emergency Department   Date of Visit:  8/16/2017           Disclosure     Insurance plans vary and the physician(s) referred by the ER may not be covered by your plan.  Please contact y CARE PHYSICIAN AT ONCE OR RETURN IMMEDIATELY TO THE EMERGENCY DEPARTMENT. If you have been prescribed any medication(s), please fill your prescription right away and begin taking the medication(s) as directed.   If you believe that any of the medications

## (undated) NOTE — LETTER
[address]  Authorization for Surgical Operation and Procedure                                                                                           I hereby authorize Morena Rosenberg MD, my physician and his/her assistants (if applicable), which may include medical students, residents, and/or fellows, to perform the following surgical operation/ procedure and administer such anesthesia as may be determined necessary by my physician: Operation/Procedure name (s) ESOPHAGOGASTRODUODENOSCOPY (EGD) on [address]   2. I recognize that during the surgical operation/procedure, unforeseen conditions may necessitate additional or different procedures than those listed above. I, therefore, further authorize and request that the above-named surgeon, assistants, or designees perform such procedures as are, in their judgment, necessary and desirable. 3.   My surgeon/physician has discussed prior to my surgery the potential benefits, risks and side effects of this procedure; the likelihood of achieving goals; and potential problems that might occur during recuperation. They also discussed reasonable alternatives to the procedure, including risks, benefits, and side effects related to the alternatives and risks related to not receiving this procedure. I have had all my questions answered and I acknowledge that no guarantee has been made as to the result that may be obtained. 4.   Should the need arise during my operation/procedure, which includes change of level of care prior to discharge, I also consent to the administration of blood and/or blood products. Further, I understand that despite careful testing and screening of blood or blood products by collecting agencies, I may still be subject to ill effects as a result of receiving a blood transfusion and/or blood products.   The following are some, but not all, of the potential risks that can occur: fever and allergic reactions, hemolytic reactions, transmission of diseases such as Hepatitis, AIDS and Cytomegalovirus (CMV) and fluid overload. In the event that I wish to have an autologous transfusion of my own blood, or a directed donor transfusion, I will discuss this with my physician. Check only if Refusing Blood or Blood Products  I understand refusal of blood or blood products as deemed necessary by my physician may have serious consequences to my condition to include possible death. I hereby assume responsibility for my refusal and release the hospital, its personnel, and my physicians from any responsibility for the consequences of my refusal.    o  Refuse   5. I authorize the use of any specimen, organs, tissues, body parts or foreign objects that may be removed from my body during the operation/procedure for diagnosis, research or teaching purposes and their subsequent disposal by hospital authorities. I also authorize the release of specimen test results and/or written reports to my treating physician on the hospital medical staff or other referring or consulting physicians involved in my care, at the discretion of the Pathologist or my treating physician. 6.   I consent to the photographing or videotaping of the operations or procedures to be performed, including appropriate portions of my body for medical, scientific, or educational purposes, provided my identity is not revealed by the pictures or by descriptive texts accompanying them. If the procedure has been photographed/videotaped, the surgeon will obtain the original picture, image, videotape or CD. The hospital will not be responsible for storage, release or maintenance of the picture, image, tape or CD.    7.   I consent to the presence of a  or observers in the operating room as deemed necessary by my physician or their designees.     8.   I recognize that in the event my procedure results in extended X-Ray/fluoroscopy time, I may develop a skin reaction. 9. If I have a Do Not Attempt Resuscitation (DNAR) order in place, that status will be suspended while in the operating room, procedural suite, and during the recovery period unless otherwise explicitly stated by me (or a person authorized to consent on my behalf). The surgeon or my attending physician will determine when the applicable recovery period ends for purposes of reinstating the DNAR order. 10. Patients having a sterilization procedure: I understand that if the procedure is successful the results will be permanent and it will therefore be impossible for me to inseminate, conceive, or bear children. I also understand that the procedure is intended to result in sterility, although the result has not been guaranteed. 11. I acknowledge that my physician has explained sedation/analgesia administration to me including the risk and benefits I consent to the administration of sedation/analgesia as may be necessary or desirable in the judgment of my physician. I CERTIFY THAT I HAVE READ AND FULLY UNDERSTAND THE ABOVE CONSENT TO OPERATION and/or OTHER PROCEDURE.     _________________________________________ _________________________________     ___________________________________  Signature of Patient     Signature of Responsible Person                   Printed Name of Responsible Person                              _________________________________________ ______________________________        ___________________________________  Signature of Witness         Date  Time         Relationship to Patient    STATEMENT OF PHYSICIAN My signature below affirms that prior to the time of the procedure; I have explained to the patient and/or his/her legal representative, the risks and benefits involved in the proposed treatment and any reasonable alternative to the proposed treatment.  I have also explained the risks and benefits involved in refusal of the proposed treatment and alternatives to the proposed treatment and have answered the patient's questions.  If I have a significant financial interest in a co-management agreement or a significant financial interest in any product or implant, or other significant relationship used in this procedure/surgery, I have disclosed this and had a discussion with my patient.     _______________________________________________________________ _____________________________  [employer] of Physician)                                                                                         (Date)                                   (Time)  Patient Name: Semaj Swanson    : 1985   Printed: 2023      Medical Record #: [id number]                                              Page 1 of 1

## (undated) NOTE — ED AVS SNAPSHOT
Archana Freed   MRN: [id number]    Department:  [facility] Emergency Department   Date of Visit:  8/4/2017           Disclosure     Insurance plans vary and the physician(s) referred by the ER may not be covered by your plan.  Please contact yo CARE PHYSICIAN AT ONCE OR RETURN IMMEDIATELY TO THE EMERGENCY DEPARTMENT. If you have been prescribed any medication(s), please fill your prescription right away and begin taking the medication(s) as directed.   If you believe that any of the medications